# Patient Record
Sex: FEMALE | Race: WHITE | Employment: OTHER | URBAN - NONMETROPOLITAN AREA
[De-identification: names, ages, dates, MRNs, and addresses within clinical notes are randomized per-mention and may not be internally consistent; named-entity substitution may affect disease eponyms.]

---

## 2017-03-22 ENCOUNTER — OFFICE VISIT (OUTPATIENT)
Dept: INTERNAL MEDICINE CLINIC | Age: 67
End: 2017-03-22

## 2017-03-22 VITALS
WEIGHT: 215 LBS | HEIGHT: 65 IN | RESPIRATION RATE: 17 BRPM | HEART RATE: 92 BPM | TEMPERATURE: 97.9 F | BODY MASS INDEX: 35.82 KG/M2 | SYSTOLIC BLOOD PRESSURE: 122 MMHG | OXYGEN SATURATION: 97 % | DIASTOLIC BLOOD PRESSURE: 68 MMHG

## 2017-03-22 DIAGNOSIS — H91.93 HEARING LOSS, BILATERAL: ICD-10-CM

## 2017-03-22 DIAGNOSIS — K76.89 LIVER DYSFUNCTION: ICD-10-CM

## 2017-03-22 DIAGNOSIS — D49.6 BRAIN NEOPLASM (HCC): ICD-10-CM

## 2017-03-22 DIAGNOSIS — E78.2 MIXED HYPERLIPIDEMIA: Primary | ICD-10-CM

## 2017-03-22 DIAGNOSIS — F32.A DEPRESSION, UNSPECIFIED DEPRESSION TYPE: ICD-10-CM

## 2017-03-22 DIAGNOSIS — N28.1 CYST OF LEFT KIDNEY: ICD-10-CM

## 2017-03-22 DIAGNOSIS — K86.2 CYST OF PANCREAS: ICD-10-CM

## 2017-03-22 DIAGNOSIS — D86.9 SARCOIDOSIS: ICD-10-CM

## 2017-03-22 DIAGNOSIS — J44.9 CHRONIC OBSTRUCTIVE PULMONARY DISEASE, UNSPECIFIED COPD TYPE (HCC): ICD-10-CM

## 2017-03-22 PROCEDURE — 99213 OFFICE O/P EST LOW 20 MIN: CPT | Performed by: INTERNAL MEDICINE

## 2017-03-22 RX ORDER — ATORVASTATIN CALCIUM 40 MG/1
40 TABLET, FILM COATED ORAL DAILY
Qty: 30 TABLET | Refills: 5 | Status: SHIPPED | OUTPATIENT
Start: 2017-03-22 | End: 2017-10-09 | Stop reason: SDUPTHER

## 2017-03-22 ASSESSMENT — ENCOUNTER SYMPTOMS
GASTROINTESTINAL NEGATIVE: 1
DOUBLE VISION: 0
SORE THROAT: 0
EYES NEGATIVE: 1
COUGH: 0
RESPIRATORY NEGATIVE: 1
BLURRED VISION: 0
SHORTNESS OF BREATH: 0

## 2017-03-28 ENCOUNTER — HOSPITAL ENCOUNTER (OUTPATIENT)
Dept: GENERAL RADIOLOGY | Age: 67
Discharge: HOME | End: 2017-03-28
Attending: INTERNAL MEDICINE | Admitting: INTERNAL MEDICINE

## 2017-03-28 DIAGNOSIS — J43.9 EMPHYSEMATOUS BLEB (HCC): ICD-10-CM

## 2017-03-28 DIAGNOSIS — D86.9 SARCOIDOSIS: ICD-10-CM

## 2017-06-21 ENCOUNTER — HOSPITAL ENCOUNTER (OUTPATIENT)
Dept: LAB | Age: 67
Discharge: HOME | End: 2017-06-21
Attending: INTERNAL MEDICINE | Admitting: INTERNAL MEDICINE

## 2017-06-21 LAB
ALBUMIN SERPL-MCNC: 4.2 GM/DL (ref 3.4–5)
ALP BLD-CCNC: 129 IU/L (ref 40–129)
ALT SERPL-CCNC: 23 U/L (ref 10–40)
AST SERPL-CCNC: 17 IU/L (ref 15–37)
BILIRUB SERPL-MCNC: 0.4 MG/DL (ref 0–1)
BILIRUBIN DIRECT: 0.2 MG/DL (ref 0–0.3)
BILIRUBIN, INDIRECT: 0.2 MG/DL (ref 0–0.7)
CHOLESTEROL, FASTING: 168 MG/DL
HDLC SERPL-MCNC: 51 MG/DL
LDL CHOLESTEROL DIRECT: 89 MG/DL
TOTAL PROTEIN: 7.6 GM/DL (ref 6.4–8.2)
TRIGLYCERIDE, FASTING: 185 MG/DL

## 2017-07-13 ENCOUNTER — OFFICE VISIT (OUTPATIENT)
Dept: INTERNAL MEDICINE CLINIC | Age: 67
End: 2017-07-13

## 2017-07-13 VITALS
DIASTOLIC BLOOD PRESSURE: 90 MMHG | RESPIRATION RATE: 13 BRPM | OXYGEN SATURATION: 96 % | HEIGHT: 65 IN | WEIGHT: 219.6 LBS | HEART RATE: 92 BPM | TEMPERATURE: 98.1 F | SYSTOLIC BLOOD PRESSURE: 148 MMHG | BODY MASS INDEX: 36.59 KG/M2

## 2017-07-13 DIAGNOSIS — K86.2 CYST OF PANCREAS: ICD-10-CM

## 2017-07-13 DIAGNOSIS — Z12.31 VISIT FOR SCREENING MAMMOGRAM: ICD-10-CM

## 2017-07-13 DIAGNOSIS — Z00.00 HEALTH CARE MAINTENANCE: ICD-10-CM

## 2017-07-13 DIAGNOSIS — D49.6 BRAIN NEOPLASM (HCC): ICD-10-CM

## 2017-07-13 DIAGNOSIS — K76.89 LIVER DYSFUNCTION: ICD-10-CM

## 2017-07-13 DIAGNOSIS — Z12.11 SCREENING FOR COLON CANCER: ICD-10-CM

## 2017-07-13 DIAGNOSIS — F32.A DEPRESSION, UNSPECIFIED DEPRESSION TYPE: ICD-10-CM

## 2017-07-13 DIAGNOSIS — E78.2 MIXED HYPERLIPIDEMIA: Primary | ICD-10-CM

## 2017-07-13 DIAGNOSIS — H91.93 HEARING LOSS, BILATERAL: ICD-10-CM

## 2017-07-13 DIAGNOSIS — J44.9 CHRONIC OBSTRUCTIVE PULMONARY DISEASE, UNSPECIFIED COPD TYPE (HCC): ICD-10-CM

## 2017-07-13 DIAGNOSIS — N28.1 CYST OF LEFT KIDNEY: ICD-10-CM

## 2017-07-13 PROCEDURE — 99214 OFFICE O/P EST MOD 30 MIN: CPT | Performed by: INTERNAL MEDICINE

## 2017-07-13 ASSESSMENT — PATIENT HEALTH QUESTIONNAIRE - PHQ9
2. FEELING DOWN, DEPRESSED OR HOPELESS: 0
1. LITTLE INTEREST OR PLEASURE IN DOING THINGS: 0
SUM OF ALL RESPONSES TO PHQ9 QUESTIONS 1 & 2: 0
SUM OF ALL RESPONSES TO PHQ QUESTIONS 1-9: 0

## 2017-07-13 ASSESSMENT — ENCOUNTER SYMPTOMS
RESPIRATORY NEGATIVE: 1
GASTROINTESTINAL NEGATIVE: 1
EYES NEGATIVE: 1

## 2017-07-18 ENCOUNTER — HOSPITAL ENCOUNTER (OUTPATIENT)
Dept: MAMMOGRAPHY | Age: 67
Discharge: HOME | End: 2017-07-18
Attending: INTERNAL MEDICINE | Admitting: INTERNAL MEDICINE

## 2017-07-18 DIAGNOSIS — Z12.31 VISIT FOR SCREENING MAMMOGRAM: ICD-10-CM

## 2017-07-20 ENCOUNTER — TELEPHONE (OUTPATIENT)
Dept: INTERNAL MEDICINE CLINIC | Age: 67
End: 2017-07-20

## 2017-07-31 ENCOUNTER — TELEPHONE (OUTPATIENT)
Dept: INTERNAL MEDICINE CLINIC | Age: 67
End: 2017-07-31

## 2017-08-03 ENCOUNTER — HOSPITAL ENCOUNTER (OUTPATIENT)
Dept: MAMMOGRAPHY | Age: 67
Discharge: HOME | End: 2017-08-03
Attending: INTERNAL MEDICINE | Admitting: INTERNAL MEDICINE

## 2017-08-03 DIAGNOSIS — R92.8 ABNORMAL MAMMOGRAM OF LEFT BREAST: ICD-10-CM

## 2017-08-03 DIAGNOSIS — R92.8 ABNORMAL MAMMOGRAM: ICD-10-CM

## 2017-08-03 DIAGNOSIS — N64.89 BREAST ASYMMETRY: ICD-10-CM

## 2017-08-14 PROBLEM — R92.8 ABNORMAL MAMMOGRAM: Status: ACTIVE | Noted: 2017-08-14

## 2017-10-09 RX ORDER — ATORVASTATIN CALCIUM 40 MG/1
40 TABLET, FILM COATED ORAL DAILY
Qty: 30 TABLET | Refills: 5 | Status: SHIPPED | OUTPATIENT
Start: 2017-10-09 | End: 2018-05-10 | Stop reason: SDUPTHER

## 2017-10-17 ENCOUNTER — OFFICE VISIT (OUTPATIENT)
Dept: INTERNAL MEDICINE CLINIC | Age: 67
End: 2017-10-17

## 2017-10-17 VITALS
OXYGEN SATURATION: 95 % | SYSTOLIC BLOOD PRESSURE: 128 MMHG | WEIGHT: 205 LBS | DIASTOLIC BLOOD PRESSURE: 72 MMHG | HEART RATE: 72 BPM | RESPIRATION RATE: 11 BRPM | TEMPERATURE: 97.7 F | BODY MASS INDEX: 34.16 KG/M2 | HEIGHT: 65 IN

## 2017-10-17 DIAGNOSIS — N28.1 CYST OF LEFT KIDNEY: ICD-10-CM

## 2017-10-17 DIAGNOSIS — D49.6 BRAIN NEOPLASM (HCC): ICD-10-CM

## 2017-10-17 DIAGNOSIS — R92.8 ABNORMAL MAMMOGRAM: ICD-10-CM

## 2017-10-17 DIAGNOSIS — K76.89 LIVER DYSFUNCTION: ICD-10-CM

## 2017-10-17 DIAGNOSIS — Z00.00 HEALTH CARE MAINTENANCE: ICD-10-CM

## 2017-10-17 DIAGNOSIS — D86.9 SARCOIDOSIS: ICD-10-CM

## 2017-10-17 DIAGNOSIS — Z23 NEED FOR PROPHYLACTIC VACCINATION AND INOCULATION AGAINST INFLUENZA: ICD-10-CM

## 2017-10-17 DIAGNOSIS — E78.2 MIXED HYPERLIPIDEMIA: Primary | ICD-10-CM

## 2017-10-17 DIAGNOSIS — J44.9 CHRONIC OBSTRUCTIVE PULMONARY DISEASE, UNSPECIFIED COPD TYPE (HCC): ICD-10-CM

## 2017-10-17 DIAGNOSIS — F32.A DEPRESSION, UNSPECIFIED DEPRESSION TYPE: ICD-10-CM

## 2017-10-17 PROCEDURE — G0008 ADMIN INFLUENZA VIRUS VAC: HCPCS | Performed by: INTERNAL MEDICINE

## 2017-10-17 PROCEDURE — 99214 OFFICE O/P EST MOD 30 MIN: CPT | Performed by: INTERNAL MEDICINE

## 2017-10-17 PROCEDURE — 90662 IIV NO PRSV INCREASED AG IM: CPT | Performed by: INTERNAL MEDICINE

## 2017-10-17 NOTE — ASSESSMENT & PLAN NOTE
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 Funmilayo Rodríguez resected tumor. Dr. Magda Smallwood oncologist seeing pt also. No chemo. Tumor recurred, right frontal craniotomy and tumor resection 11/14 Dr. Alysa Sampson. Had radiation to the brain  Dr. Alysa Sampson following pt q year. Pt states she had MRI brain in 7 or 8/2017: was told it was stable.  F/u in one year

## 2017-10-17 NOTE — PATIENT INSTRUCTIONS
Vaccine Information Sheet, \"Influenza - Inactivated\"  given to Wali Cooper, or parent/legal guardian of  Wali Cooper and verbalized understanding. Patient responses:    Have you ever had a reaction to a flu vaccine? NO  Are you able to eat eggs without adverse effects? YES  Do you have any current illness? NO  Have you ever had Guillian [place] Syndrome? NO    Flu vaccine given per order. Please see immunization tab.

## 2017-10-17 NOTE — PROGRESS NOTES
Prabhjot Bocanegra  Patient's  is 1950  Seen in office on 10/17/2017      SUBJECTIVE:  Immanuel May is a 75 y. o.year old female presents today   Chief Complaint   Patient presents with    Hyperlipidemia     Patient is here for follow-up of hyperlipidemia COPD  Patient is taking medications. She has COPD also using inhalers and is feeling better  She had abnormal mammogram that needs to be repeated in 6 months  Patient denies any headaches. No chest pain, no shortness of breath. No cough or sputum production. No nausea, vomiting or diarrhea  Taking medications regularly. No side effects noted. Review of Systems   Constitutional: Negative. HENT: Negative. Eyes: Negative. Cardiovascular: Negative. Negative for chest pain and palpitations. Gastrointestinal: Negative. Genitourinary: Negative. Skin: Negative. Neurological: Negative. Endo/Heme/Allergies: Negative. Psychiatric/Behavioral: Negative. OBJECTIVE: /72 (Site: Left Arm, Position: Sitting)   Pulse 72   Temp 97.7 °F (36.5 °C)   Resp 11   Ht 5' 5\" (1.651 m) Comment: w shoes  Wt 205 lb (93 kg)   SpO2 95%   BMI 34.11 kg/m²     Wt Readings from Last 3 Encounters:   10/17/17 205 lb (93 kg)   17 213 lb (96.6 kg)   17 219 lb 9.6 oz (99.6 kg)      GENERAL:  Alert, oriented, pleasant, in no apparent distress. HEENT:  Conjunctiva pink, no scleral icterus. ENT clear. NECK:  Supple. No jugular venous distention noted. No masses felt,  CARDIOVASCULAR:  Normal S1 and S2    PULMONARY:  No respiratory distress. No wheezes or rales. ABDOMEN:  Soft and non-tender,no masses  or organomegaly. EXTREMITIES:  No cyanosis, clubbing, or significant edema. SKIN: Skin is warm and dry. NEUROLOGICAL:  Cranial nerves II through XII are grossly intact. IMPRESSION:    Encounter Diagnoses   Name Primary?     Mixed hyperlipidemia Yes    [facility]     Depression, unspecified depression type     Tetracycline Rash     Current Outpatient Prescriptions   Medication Sig Dispense Refill    atorvastatin (LIPITOR) 40 MG tablet Take 1 tablet by mouth daily 30 tablet 5    umeclidinium-vilanterol (ANORO ELLIPTA) 62.5-25 MCG/INH AEPB inhaler 1 puff daily 1 each 5    albuterol sulfate HFA (PROAIR HFA) 108 (90 BASE) MCG/ACT inhaler Inhale 2 puffs into the lungs every 6 hours as needed for Wheezing 1 Inhaler 3    traZODone (DESYREL) 50 MG tablet Take 25 mg by mouth nightly. 1    risperiDONE (RISPERDAL) 1 MG tablet Take 1 mg by mouth nightly.  buPROPion (WELLBUTRIN XL) 300 MG XL tablet Take 300 mg by mouth every morning. No current facility-administered medications for this visit. Past Medical History:   Diagnosis Date    Abnormal mammogram 8/14/2017    0.4 cm hypoechoic mass(most likely a small intramammary lymph node or less likely a complicated cyst.  Follow-up in 6 months recommended with ultrasound    Attempted suicide 2012    hanged herself. 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 Al Nowak resected tumor. Dr. Robbie Siddqii oncologist seeing pt also. No chemo. Dr. Al Nowak following pt q year. Tumor recurred, right frontal craniotomy and tumor resection 11/14 Dr. Jermaine Marx. Had radiation to the brain     Chronic obstructive pulmonary disease ([address] 6/15/2016    PFT showed mod sees Dr Dani Miller RW    Cyst of left kidney 9/11/2014    MR I of 7/14. follow-up in 6-12 months    Cyst of pancreas 9/11/2014    Needs follow-up in one year 7/15    Depression     Hearing loss     Hearing loss     Herniated disc     L4 &L5    Hyperlipidemia     Kidney stones     Liver dysfunction 6/6/2014    In 7/14 hepatitis ABC negative. Immune studies negative, iron studies normal.  MRI of liver mild fatty liver.  F/u LFTS normal.    Osteopenia 9/26/14    Sarcoidosis ([address]     Skin cancer     nose     Past Surgical History:   Procedure Laterality Date    CHOLECYSTECTOMY  2006    COLONOSCOPY

## 2017-10-23 ASSESSMENT — ENCOUNTER SYMPTOMS
GASTROINTESTINAL NEGATIVE: 1
EYES NEGATIVE: 1

## 2018-02-08 ENCOUNTER — OFFICE VISIT (OUTPATIENT)
Dept: INTERNAL MEDICINE CLINIC | Age: 68
End: 2018-02-08

## 2018-02-08 VITALS
OXYGEN SATURATION: 96 % | SYSTOLIC BLOOD PRESSURE: 120 MMHG | TEMPERATURE: 97.7 F | WEIGHT: 201 LBS | BODY MASS INDEX: 39.26 KG/M2 | DIASTOLIC BLOOD PRESSURE: 78 MMHG | RESPIRATION RATE: 18 BRPM | HEART RATE: 88 BPM

## 2018-02-08 DIAGNOSIS — K86.2 CYST OF PANCREAS: ICD-10-CM

## 2018-02-08 DIAGNOSIS — K76.89 LIVER DYSFUNCTION: ICD-10-CM

## 2018-02-08 DIAGNOSIS — D49.6 BRAIN NEOPLASM (HCC): ICD-10-CM

## 2018-02-08 DIAGNOSIS — Z00.00 HEALTH CARE MAINTENANCE: ICD-10-CM

## 2018-02-08 DIAGNOSIS — H91.93 BILATERAL HEARING LOSS, UNSPECIFIED HEARING LOSS TYPE: ICD-10-CM

## 2018-02-08 DIAGNOSIS — F32.A DEPRESSION, UNSPECIFIED DEPRESSION TYPE: ICD-10-CM

## 2018-02-08 DIAGNOSIS — D86.9 SARCOIDOSIS: ICD-10-CM

## 2018-02-08 DIAGNOSIS — J44.9 CHRONIC OBSTRUCTIVE PULMONARY DISEASE, UNSPECIFIED COPD TYPE (HCC): ICD-10-CM

## 2018-02-08 DIAGNOSIS — R92.8 ABNORMAL MAMMOGRAM: ICD-10-CM

## 2018-02-08 DIAGNOSIS — E78.2 MIXED HYPERLIPIDEMIA: Primary | ICD-10-CM

## 2018-02-08 PROCEDURE — G8427 DOCREV CUR MEDS BY ELIG CLIN: HCPCS | Performed by: INTERNAL MEDICINE

## 2018-02-08 PROCEDURE — 1123F ACP DISCUSS/DSCN MKR DOCD: CPT | Performed by: INTERNAL MEDICINE

## 2018-02-08 PROCEDURE — 1090F PRES/ABSN URINE INCON ASSESS: CPT | Performed by: INTERNAL MEDICINE

## 2018-02-08 PROCEDURE — G8926 SPIRO NO PERF OR DOC: HCPCS | Performed by: INTERNAL MEDICINE

## 2018-02-08 PROCEDURE — 4040F PNEUMOC VAC/ADMIN/RCVD: CPT | Performed by: INTERNAL MEDICINE

## 2018-02-08 PROCEDURE — 3017F COLORECTAL CA SCREEN DOC REV: CPT | Performed by: INTERNAL MEDICINE

## 2018-02-08 PROCEDURE — G8484 FLU IMMUNIZE NO ADMIN: HCPCS | Performed by: INTERNAL MEDICINE

## 2018-02-08 PROCEDURE — 3014F SCREEN MAMMO DOC REV: CPT | Performed by: INTERNAL MEDICINE

## 2018-02-08 PROCEDURE — 99214 OFFICE O/P EST MOD 30 MIN: CPT | Performed by: INTERNAL MEDICINE

## 2018-02-08 PROCEDURE — 1036F TOBACCO NON-USER: CPT | Performed by: INTERNAL MEDICINE

## 2018-02-08 PROCEDURE — G8417 CALC BMI ABV UP PARAM F/U: HCPCS | Performed by: INTERNAL MEDICINE

## 2018-02-08 PROCEDURE — 3023F SPIROM DOC REV: CPT | Performed by: INTERNAL MEDICINE

## 2018-02-08 PROCEDURE — G8399 PT W/DXA RESULTS DOCUMENT: HCPCS | Performed by: INTERNAL MEDICINE

## 2018-02-08 ASSESSMENT — ENCOUNTER SYMPTOMS
RESPIRATORY NEGATIVE: 1
EYES NEGATIVE: 1
GASTROINTESTINAL NEGATIVE: 1

## 2018-02-08 NOTE — ASSESSMENT & PLAN NOTE
5 mm non-enhancing cyst on the pancreas has not changed. Non-specific. Could represent a side branch off I PMN Needs follow-up in one year in April 2016  Patient had MRI of the pancreas and kidney cyst in April 2015. Both are stable and needed follow-up in one year. 6/16 MRI abdomen : no change. MRI of the abdomen ordered  Patient has a cyst in the pancreas therefore an MRI was ordered for a follow up of the cyst.  Talked to peer to peer Dr. Jody Hazel and she recommended MRI to be done next year. She said the protocols states there is no need to do that frequently. Will do repeat MRI of the pancreas next year. Patient will be informed.   MRI will be canceled

## 2018-02-08 NOTE — ASSESSMENT & PLAN NOTE
Patient has mild elevation of liver functions. Statins were on hold. Follow low-cholesterol diet. Patient was started on atorvastatin and is tolerating it well.

## 2018-03-01 ENCOUNTER — HOSPITAL ENCOUNTER (OUTPATIENT)
Dept: ULTRASOUND IMAGING | Age: 68
Discharge: HOME | End: 2018-03-01
Attending: INTERNAL MEDICINE | Admitting: INTERNAL MEDICINE

## 2018-03-01 DIAGNOSIS — R92.8 ABNORMAL MAMMOGRAM: ICD-10-CM

## 2018-03-01 DIAGNOSIS — R92.8 OTHER ABNORMAL AND INCONCLUSIVE FINDINGS ON DIAGNOSTIC IMAGING OF BREAST: ICD-10-CM

## 2018-03-06 ENCOUNTER — TELEPHONE (OUTPATIENT)
Dept: INTERNAL MEDICINE CLINIC | Age: 68
End: 2018-03-06

## 2018-04-11 PROBLEM — Z00.00 HEALTH CARE MAINTENANCE: Status: RESOLVED | Noted: 2017-07-13 | Resolved: 2018-04-11

## 2018-05-10 ENCOUNTER — OFFICE VISIT (OUTPATIENT)
Dept: INTERNAL MEDICINE CLINIC | Age: 68
End: 2018-05-10

## 2018-05-10 VITALS
SYSTOLIC BLOOD PRESSURE: 126 MMHG | BODY MASS INDEX: 39.33 KG/M2 | RESPIRATION RATE: 12 BRPM | HEART RATE: 88 BPM | DIASTOLIC BLOOD PRESSURE: 82 MMHG | TEMPERATURE: 98.2 F | WEIGHT: 201.4 LBS | OXYGEN SATURATION: 96 %

## 2018-05-10 DIAGNOSIS — D49.6 BRAIN NEOPLASM (HCC): ICD-10-CM

## 2018-05-10 DIAGNOSIS — F32.A DEPRESSION, UNSPECIFIED DEPRESSION TYPE: ICD-10-CM

## 2018-05-10 DIAGNOSIS — J44.9 CHRONIC OBSTRUCTIVE PULMONARY DISEASE, UNSPECIFIED COPD TYPE (HCC): ICD-10-CM

## 2018-05-10 DIAGNOSIS — H91.93 BILATERAL HEARING LOSS, UNSPECIFIED HEARING LOSS TYPE: ICD-10-CM

## 2018-05-10 DIAGNOSIS — D86.9 SARCOIDOSIS: ICD-10-CM

## 2018-05-10 DIAGNOSIS — K76.89 LIVER DYSFUNCTION: ICD-10-CM

## 2018-05-10 DIAGNOSIS — C71.9 OLIGOASTROCYTOMA, WHO GRADE 2 (HCC): ICD-10-CM

## 2018-05-10 DIAGNOSIS — E78.2 MIXED HYPERLIPIDEMIA: Primary | ICD-10-CM

## 2018-05-10 PROCEDURE — 3023F SPIROM DOC REV: CPT | Performed by: INTERNAL MEDICINE

## 2018-05-10 PROCEDURE — 4040F PNEUMOC VAC/ADMIN/RCVD: CPT | Performed by: INTERNAL MEDICINE

## 2018-05-10 PROCEDURE — 3017F COLORECTAL CA SCREEN DOC REV: CPT | Performed by: INTERNAL MEDICINE

## 2018-05-10 PROCEDURE — G8926 SPIRO NO PERF OR DOC: HCPCS | Performed by: INTERNAL MEDICINE

## 2018-05-10 PROCEDURE — 1036F TOBACCO NON-USER: CPT | Performed by: INTERNAL MEDICINE

## 2018-05-10 PROCEDURE — G8417 CALC BMI ABV UP PARAM F/U: HCPCS | Performed by: INTERNAL MEDICINE

## 2018-05-10 PROCEDURE — 3014F SCREEN MAMMO DOC REV: CPT | Performed by: INTERNAL MEDICINE

## 2018-05-10 PROCEDURE — 1090F PRES/ABSN URINE INCON ASSESS: CPT | Performed by: INTERNAL MEDICINE

## 2018-05-10 PROCEDURE — 1123F ACP DISCUSS/DSCN MKR DOCD: CPT | Performed by: INTERNAL MEDICINE

## 2018-05-10 PROCEDURE — G8399 PT W/DXA RESULTS DOCUMENT: HCPCS | Performed by: INTERNAL MEDICINE

## 2018-05-10 PROCEDURE — 99214 OFFICE O/P EST MOD 30 MIN: CPT | Performed by: INTERNAL MEDICINE

## 2018-05-10 PROCEDURE — G8427 DOCREV CUR MEDS BY ELIG CLIN: HCPCS | Performed by: INTERNAL MEDICINE

## 2018-05-10 RX ORDER — RISPERIDONE 1 MG/1
1 TABLET, FILM COATED ORAL NIGHTLY
Qty: 30 TABLET | Refills: 1 | Status: SHIPPED | OUTPATIENT
Start: 2018-05-10 | End: 2018-06-27

## 2018-05-10 RX ORDER — ATORVASTATIN CALCIUM 40 MG/1
40 TABLET, FILM COATED ORAL DAILY
Qty: 30 TABLET | Refills: 5 | Status: SHIPPED | OUTPATIENT
Start: 2018-05-10 | End: 2018-07-09 | Stop reason: SDUPTHER

## 2018-05-10 RX ORDER — TRAZODONE HYDROCHLORIDE 50 MG/1
50 TABLET ORAL NIGHTLY
Qty: 30 TABLET | Refills: 1 | Status: SHIPPED | OUTPATIENT
Start: 2018-05-10 | End: 2018-06-27

## 2018-05-10 RX ORDER — BUPROPION HYDROCHLORIDE 300 MG/1
300 TABLET ORAL EVERY MORNING
Qty: 30 TABLET | Refills: 1 | Status: SHIPPED | OUTPATIENT
Start: 2018-05-10 | End: 2018-10-03

## 2018-05-10 ASSESSMENT — ENCOUNTER SYMPTOMS
SHORTNESS OF BREATH: 0
GASTROINTESTINAL NEGATIVE: 1
COUGH: 0
BACK PAIN: 0
EYES NEGATIVE: 1
RESPIRATORY NEGATIVE: 1

## 2018-05-11 ENCOUNTER — HOSPITAL ENCOUNTER (OUTPATIENT)
Dept: LAB | Age: 68
Discharge: HOME | End: 2018-05-11
Attending: INTERNAL MEDICINE | Admitting: INTERNAL MEDICINE

## 2018-05-11 DIAGNOSIS — D86.9 SARCOIDOSIS: ICD-10-CM

## 2018-05-11 LAB
ALBUMIN SERPL-MCNC: 4.1 GM/DL (ref 3.4–5)
ALP BLD-CCNC: 143 IU/L (ref 40–129)
ALT SERPL-CCNC: 56 U/L (ref 10–40)
ANION GAP SERPL CALCULATED.3IONS-SCNC: 12 MMOL/L (ref 4–16)
AST SERPL-CCNC: 26 IU/L (ref 15–37)
BILIRUB SERPL-MCNC: 0.4 MG/DL (ref 0–1)
BUN BLDV-MCNC: 13 MG/DL (ref 6–23)
CALCIUM SERPL-MCNC: 9.6 MG/DL (ref 8.3–10.6)
CHLORIDE BLD-SCNC: 98 MMOL/L (ref 99–110)
CHOLESTEROL, FASTING: 263 MG/DL
CO2: 28 MMOL/L (ref 21–32)
CREAT SERPL-MCNC: 1.1 MG/DL (ref 0.6–1.1)
GFR AFRICAN AMERICAN: 60 ML/MIN/1.73M2
GFR NON-AFRICAN AMERICAN: 49 ML/MIN/1.73M2
GLUCOSE FASTING: 122 MG/DL (ref 70–99)
HDLC SERPL-MCNC: 53 MG/DL
LDL CHOLESTEROL DIRECT: 173 MG/DL
POTASSIUM SERPL-SCNC: 4.4 MMOL/L (ref 3.5–5.1)
SODIUM BLD-SCNC: 138 MMOL/L (ref 135–145)
TOTAL PROTEIN: 7.5 GM/DL (ref 6.4–8.2)
TRIGLYCERIDE, FASTING: 308 MG/DL

## 2018-05-14 ENCOUNTER — TELEPHONE (OUTPATIENT)
Dept: INTERNAL MEDICINE CLINIC | Age: 68
End: 2018-05-14

## 2018-05-14 DIAGNOSIS — E78.2 MIXED HYPERLIPIDEMIA: Primary | ICD-10-CM

## 2018-07-09 ENCOUNTER — HOSPITAL ENCOUNTER (OUTPATIENT)
Dept: LAB | Age: 68
Discharge: HOME | End: 2018-07-09
Attending: INTERNAL MEDICINE | Admitting: INTERNAL MEDICINE

## 2018-07-09 LAB
CHOLESTEROL, FASTING: 189 MG/DL
HDLC SERPL-MCNC: 60 MG/DL
LDL CHOLESTEROL DIRECT: 110 MG/DL
TRIGLYCERIDE, FASTING: 253 MG/DL

## 2018-07-09 RX ORDER — ATORVASTATIN CALCIUM 40 MG/1
40 TABLET, FILM COATED ORAL DAILY
Qty: 90 TABLET | Refills: 1 | Status: SHIPPED | OUTPATIENT
Start: 2018-07-09 | End: 2019-01-15 | Stop reason: SDUPTHER

## 2018-07-11 ENCOUNTER — OFFICE VISIT (OUTPATIENT)
Dept: INTERNAL MEDICINE CLINIC | Age: 68
End: 2018-07-11

## 2018-07-11 VITALS
BODY MASS INDEX: 31.69 KG/M2 | HEIGHT: 66 IN | TEMPERATURE: 98.1 F | OXYGEN SATURATION: 97 % | RESPIRATION RATE: 20 BRPM | DIASTOLIC BLOOD PRESSURE: 86 MMHG | SYSTOLIC BLOOD PRESSURE: 132 MMHG | WEIGHT: 197.2 LBS | HEART RATE: 85 BPM

## 2018-07-11 DIAGNOSIS — D49.6 BRAIN NEOPLASM (HCC): ICD-10-CM

## 2018-07-11 DIAGNOSIS — K76.89 LIVER DYSFUNCTION: ICD-10-CM

## 2018-07-11 DIAGNOSIS — E78.2 MIXED HYPERLIPIDEMIA: ICD-10-CM

## 2018-07-11 DIAGNOSIS — H91.93 BILATERAL HEARING LOSS, UNSPECIFIED HEARING LOSS TYPE: ICD-10-CM

## 2018-07-11 DIAGNOSIS — D86.9 SARCOIDOSIS: ICD-10-CM

## 2018-07-11 DIAGNOSIS — F32.A DEPRESSION, UNSPECIFIED DEPRESSION TYPE: ICD-10-CM

## 2018-07-11 PROCEDURE — G8427 DOCREV CUR MEDS BY ELIG CLIN: HCPCS | Performed by: INTERNAL MEDICINE

## 2018-07-11 PROCEDURE — 4040F PNEUMOC VAC/ADMIN/RCVD: CPT | Performed by: INTERNAL MEDICINE

## 2018-07-11 PROCEDURE — 1036F TOBACCO NON-USER: CPT | Performed by: INTERNAL MEDICINE

## 2018-07-11 PROCEDURE — 1123F ACP DISCUSS/DSCN MKR DOCD: CPT | Performed by: INTERNAL MEDICINE

## 2018-07-11 PROCEDURE — 3017F COLORECTAL CA SCREEN DOC REV: CPT | Performed by: INTERNAL MEDICINE

## 2018-07-11 PROCEDURE — 1090F PRES/ABSN URINE INCON ASSESS: CPT | Performed by: INTERNAL MEDICINE

## 2018-07-11 PROCEDURE — G8417 CALC BMI ABV UP PARAM F/U: HCPCS | Performed by: INTERNAL MEDICINE

## 2018-07-11 PROCEDURE — 3014F SCREEN MAMMO DOC REV: CPT | Performed by: INTERNAL MEDICINE

## 2018-07-11 PROCEDURE — 1101F PT FALLS ASSESS-DOCD LE1/YR: CPT | Performed by: INTERNAL MEDICINE

## 2018-07-11 PROCEDURE — 99214 OFFICE O/P EST MOD 30 MIN: CPT | Performed by: INTERNAL MEDICINE

## 2018-07-11 PROCEDURE — G8399 PT W/DXA RESULTS DOCUMENT: HCPCS | Performed by: INTERNAL MEDICINE

## 2018-07-11 ASSESSMENT — ENCOUNTER SYMPTOMS
GASTROINTESTINAL NEGATIVE: 1
EYES NEGATIVE: 1
RESPIRATORY NEGATIVE: 1

## 2018-07-11 NOTE — ASSESSMENT & PLAN NOTE
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 Cain Cowden resected tumor. Dr. Kim Santillan oncologist seeing pt also. No chemo. Tumor recurred, right frontal craniotomy and tumor resection 11/14 Dr. Marin Olsen. Had radiation to the brain  Dr. Marin Olsen following pt q year. Pt states she had MRI brain in 7 or 8/2017: was told it was stable. F/u in one year  Seen Dr Jazmyne Flores last week and she was told to be stable. Dr Candi Byers seen pt in 6/2018.  Will see in 6 months again

## 2018-07-11 NOTE — PROGRESS NOTES
IMPRESSION:    Encounter Diagnoses   Name Primary?  Mixed hyperlipidemia     Depression, unspecified depression type     [facility]     Sarcoidosis     Bilateral hearing loss, unspecified hearing loss type     Liver dysfunction        ASSESSMENT/PLAN:    Mixed hyperlipidemia  Patient has mild elevation of liver functions. Statins were on hold. Follow low-cholesterol diet. Patient was started on atorvastatin and is tolerating it well. Pt was not taking on regular basis now taking atorvastatin daily  ALT : 56    Sarcoidosis  See Dr Santos Torre. He saw patient and he started her on inhaler Anora and albuterol prn    Depression  Had suicidal attempt by hanging. Sees psychiatrist Dr. Cj Schafer  On trazodone and risperidone and bupropion  Dr Cj Schafer retired : pt  Does not want to see new one  She stopped trazodone and risperidone . Takes Welbutrin   Continue welbutrin. 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 Hussein Agosto resected tumor. Dr. Boogie [facility] oncologist seeing pt also. No chemo. Tumor recurred, right frontal craniotomy and tumor resection 11/14 Dr. Alicia Leroy. Had radiation to the brain  Dr. Alicia Leroy following pt q year. Pt states she had MRI brain in 7 or 8/2017: was told it was stable. F/u in one year  Seen Dr Trupti Carpenter last week and she was told to be stable. Dr Morena Evangelista seen pt in 6/2018. Will see in 6 months again      Return to office in 3 months. Mediations reviewed with the patient. Continue current medications. Appropriate prescriptions are addressed. After visit summery provided. Follow up as directed sooner if needed. Questions answered and patient verbalizes understanding. Call for any problems, questions, or concerns.        Allergies   Allergen Reactions    Minocycline     Tetracyclines & Related     Demeclocycline Rash    Tetracycline Rash     Current Outpatient Prescriptions Medication Sig Dispense Refill    atorvastatin (LIPITOR) 40 MG tablet Take 1 tablet by mouth daily 90 tablet 1    umeclidinium-vilanterol (ANORO ELLIPTA) 62.5-25 MCG/INH AEPB inhaler 1 puff daily 1 each 5    buPROPion (WELLBUTRIN XL) 300 MG extended release tablet Take 1 tablet by mouth every morning 30 tablet 1    albuterol sulfate HFA (PROAIR HFA) 108 (90 Base) MCG/ACT inhaler Inhale 2 puffs into the lungs every 6 hours as needed for Wheezing 1 Inhaler 3     No current facility-administered medications for this visit. Past Medical History:   Diagnosis Date    Abnormal mammogram 8/14/2017    0.4 cm hypoechoic mass(most likely a small intramammary lymph node or less likely a complicated cyst.  Follow-up in 6 months recommended with ultrasound    Attempted suicide 2012    hanged herself. 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 Ronny Kohler resected tumor. Dr. Vikas Huber oncologist seeing pt also. No chemo. Dr. Ronny Kohler following pt q year. Tumor recurred, right frontal craniotomy and tumor resection 11/14 Dr. Albert Simon. Had radiation to the brain     Chronic obstructive pulmonary disease ([facility] [facility] 6/15/2016    PFT showed mod sees Dr Jimbo Tan RW    Cyst of left kidney 9/11/2014    MR I of 7/14. follow-up in 6-12 months    Cyst of pancreas 9/11/2014    Needs follow-up in one year 7/15    Depression     Hearing loss     Hearing loss     Herniated disc     L4 &L5    Hyperlipidemia     Kidney stones     Liver dysfunction 6/6/2014    In 7/14 hepatitis ABC negative. Immune studies negative, iron studies normal.  MRI of liver mild fatty liver.  F/u LFTS normal.    Osteopenia 9/26/14    Sarcoidosis ([address]     Skin cancer     nose     Past Surgical History:   Procedure Laterality Date    CHOLECYSTECTOMY  2006    COLONOSCOPY  2008    f/u 5 years- Dr Ever Giraldo  4/13    resection of brain tumor Dr. Omero Dowell LITHOTRIPSY  2005    Albertina Masters  2011     Ric    SKIN BIOPSY      TONSILLECTOMY       Social History   Substance Use Topics    Smoking status: Former Smoker     Packs/day: 0.75     Years: 20.00     Quit date: 8/22/2003    Smokeless tobacco: Never Used    Alcohol use No       LAB REVIEW:  CBC:   Lab Results   Component Value Date    WBC 6.7 04/11/2014    HGB 13.8 04/11/2014    HCT 42.4 04/11/2014     04/11/2014     Lipids:   Lab Results   Component Value Date    CHOL 179 12/16/2016    TRIG 231 (H) 12/16/2016    HDL 60 07/09/2018    LDLDIRECT 110 (H) 07/09/2018    TRIGLYCFAST 253 (H) 07/09/2018    CHOLESTFAST 189 07/09/2018     Renal:   Lab Results   Component Value Date    BUN 13 05/11/2018    CREATININE 1.1 05/11/2018     05/11/2018    K 4.4 05/11/2018    ALT 56 05/11/2018    AST 26 05/11/2018     PT/INR:   Lab Results   Component Value Date    INR 0.91 10/14/2012     A1C:   Lab Results   Component Value Date    LABA1C 5.6 06/02/2014           Aura Perez MD, 7/11/2018 , 2:24 PM

## 2018-07-26 ENCOUNTER — TELEPHONE (OUTPATIENT)
Dept: INTERNAL MEDICINE CLINIC | Age: 68
End: 2018-07-26

## 2018-07-26 NOTE — TELEPHONE ENCOUNTER
Patient called asking for a referral to Dr. Florina Quezada at [facility] per Dr. Jose Zarate at [facility].

## 2018-07-27 ENCOUNTER — TELEPHONE (OUTPATIENT)
Dept: INTERNAL MEDICINE CLINIC | Age: 68
End: 2018-07-27

## 2018-07-27 NOTE — TELEPHONE ENCOUNTER
Tried to reach patient to find out why the dr at [facility] cannot see her. She was told to get a referral from us to see Dr Lynda Bergman. Dr Paulino Kaur notified and needs to know why. Pt needs to see a doctor of psychology to prescribe meds. No answer at home. Called [facility] in [place], no documentation of such. Instructed to  in [place]. Office Bertrand Eaton at [phone]. Spoke with Donna Oquendo and she suggested that dr send her a referral to [place] office on Monday. Fax [phone]. Tried to reach patient again, no answer.

## 2018-08-07 DIAGNOSIS — F32.A DEPRESSION, UNSPECIFIED DEPRESSION TYPE: Primary | ICD-10-CM

## 2018-08-09 ENCOUNTER — TELEPHONE (OUTPATIENT)
Dept: INTERNAL MEDICINE CLINIC | Age: 68
End: 2018-08-09

## 2018-10-08 NOTE — ASSESSMENT & PLAN NOTE
Had flu vaccine today this 16 female presents to ed for ankle pain . patient states she was coming off city bus [place] friday and lost her footing. patient Is having pain.

## 2018-10-13 ENCOUNTER — HOSPITAL ENCOUNTER (OUTPATIENT)
Age: 68
Discharge: HOME | End: 2018-10-13
Payer: MEDICARE

## 2018-10-13 DIAGNOSIS — E78.2 MIXED HYPERLIPIDEMIA: ICD-10-CM

## 2018-10-13 LAB
ALBUMIN SERPL-MCNC: 4 GM/DL (ref 3.4–5)
ALP BLD-CCNC: 124 IU/L (ref 40–129)
ALT SERPL-CCNC: 24 U/L (ref 10–40)
AST SERPL-CCNC: 14 IU/L (ref 15–37)
BILIRUB SERPL-MCNC: 0.3 MG/DL (ref 0–1)
BILIRUBIN DIRECT: 0.2 MG/DL (ref 0–0.3)
BILIRUBIN, INDIRECT: 0.1 MG/DL (ref 0–0.7)
CHOLESTEROL: 170 MG/DL
HDLC SERPL-MCNC: 60 MG/DL
LDL CHOLESTEROL DIRECT: 101 MG/DL
TOTAL PROTEIN: 7.5 GM/DL (ref 6.4–8.2)
TRIGL SERPL-MCNC: 148 MG/DL

## 2018-10-13 PROCEDURE — 80076 HEPATIC FUNCTION PANEL: CPT

## 2018-10-13 PROCEDURE — 36415 COLL VENOUS BLD VENIPUNCTURE: CPT

## 2018-10-13 PROCEDURE — 83721 ASSAY OF BLOOD LIPOPROTEIN: CPT

## 2018-10-13 PROCEDURE — 80061 LIPID PANEL: CPT

## 2018-10-15 ENCOUNTER — OFFICE VISIT (OUTPATIENT)
Dept: INTERNAL MEDICINE CLINIC | Age: 68
End: 2018-10-15
Payer: MEDICARE

## 2018-10-15 VITALS
RESPIRATION RATE: 16 BRPM | DIASTOLIC BLOOD PRESSURE: 78 MMHG | WEIGHT: 211 LBS | OXYGEN SATURATION: 98 % | HEART RATE: 93 BPM | SYSTOLIC BLOOD PRESSURE: 124 MMHG | TEMPERATURE: 97.8 F | BODY MASS INDEX: 41.21 KG/M2

## 2018-10-15 DIAGNOSIS — Z23 NEED FOR PROPHYLACTIC VACCINATION AND INOCULATION AGAINST VARICELLA: ICD-10-CM

## 2018-10-15 DIAGNOSIS — Z12.31 VISIT FOR SCREENING MAMMOGRAM: ICD-10-CM

## 2018-10-15 DIAGNOSIS — K76.89 LIVER DYSFUNCTION: ICD-10-CM

## 2018-10-15 DIAGNOSIS — D49.6 BRAIN NEOPLASM (HCC): ICD-10-CM

## 2018-10-15 DIAGNOSIS — H91.93 BILATERAL HEARING LOSS, UNSPECIFIED HEARING LOSS TYPE: ICD-10-CM

## 2018-10-15 DIAGNOSIS — J44.9 CHRONIC OBSTRUCTIVE PULMONARY DISEASE, UNSPECIFIED COPD TYPE (HCC): ICD-10-CM

## 2018-10-15 DIAGNOSIS — Z23 NEED FOR PROPHYLACTIC VACCINATION AGAINST DIPHTHERIA-TETANUS-PERTUSSIS (DTP): ICD-10-CM

## 2018-10-15 DIAGNOSIS — E78.2 MIXED HYPERLIPIDEMIA: Primary | ICD-10-CM

## 2018-10-15 DIAGNOSIS — F32.A DEPRESSION, UNSPECIFIED DEPRESSION TYPE: ICD-10-CM

## 2018-10-15 DIAGNOSIS — C71.9 OLIGOASTROCYTOMA, WHO GRADE 2 (HCC): ICD-10-CM

## 2018-10-15 DIAGNOSIS — D86.9 SARCOIDOSIS: ICD-10-CM

## 2018-10-15 PROCEDURE — G8482 FLU IMMUNIZE ORDER/ADMIN: HCPCS | Performed by: INTERNAL MEDICINE

## 2018-10-15 PROCEDURE — 3017F COLORECTAL CA SCREEN DOC REV: CPT | Performed by: INTERNAL MEDICINE

## 2018-10-15 PROCEDURE — 99214 OFFICE O/P EST MOD 30 MIN: CPT | Performed by: INTERNAL MEDICINE

## 2018-10-15 PROCEDURE — G8399 PT W/DXA RESULTS DOCUMENT: HCPCS | Performed by: INTERNAL MEDICINE

## 2018-10-15 PROCEDURE — G8427 DOCREV CUR MEDS BY ELIG CLIN: HCPCS | Performed by: INTERNAL MEDICINE

## 2018-10-15 PROCEDURE — G8417 CALC BMI ABV UP PARAM F/U: HCPCS | Performed by: INTERNAL MEDICINE

## 2018-10-15 PROCEDURE — 3023F SPIROM DOC REV: CPT | Performed by: INTERNAL MEDICINE

## 2018-10-15 PROCEDURE — 1090F PRES/ABSN URINE INCON ASSESS: CPT | Performed by: INTERNAL MEDICINE

## 2018-10-15 PROCEDURE — 1123F ACP DISCUSS/DSCN MKR DOCD: CPT | Performed by: INTERNAL MEDICINE

## 2018-10-15 PROCEDURE — 1101F PT FALLS ASSESS-DOCD LE1/YR: CPT | Performed by: INTERNAL MEDICINE

## 2018-10-15 PROCEDURE — 4040F PNEUMOC VAC/ADMIN/RCVD: CPT | Performed by: INTERNAL MEDICINE

## 2018-10-15 PROCEDURE — 3014F SCREEN MAMMO DOC REV: CPT | Performed by: INTERNAL MEDICINE

## 2018-10-15 PROCEDURE — 1036F TOBACCO NON-USER: CPT | Performed by: INTERNAL MEDICINE

## 2018-10-15 PROCEDURE — G8926 SPIRO NO PERF OR DOC: HCPCS | Performed by: INTERNAL MEDICINE

## 2018-10-15 NOTE — PROGRESS NOTES
CREATININE 1.1 05/11/2018     05/11/2018    K 4.4 05/11/2018    ALT 24 10/13/2018    AST 14 10/13/2018     PT/INR:   Lab Results   Component Value Date    INR 0.91 10/14/2012     A1C:   Lab Results   Component Value Date    LABA1C 5.6 06/02/2014           Merrick Rodriguez MD, 10/15/2018 , 2:54 PM

## 2018-10-24 ENCOUNTER — HOSPITAL ENCOUNTER (OUTPATIENT)
Dept: MAMMOGRAPHY | Age: 68
Discharge: HOME | End: 2018-10-24
Payer: MEDICARE

## 2018-10-24 DIAGNOSIS — Z12.31 VISIT FOR SCREENING MAMMOGRAM: ICD-10-CM

## 2018-10-24 PROCEDURE — 77067 SCR MAMMO BI INCL CAD: CPT

## 2018-10-28 ASSESSMENT — ENCOUNTER SYMPTOMS
RESPIRATORY NEGATIVE: 1
ALLERGIC/IMMUNOLOGIC NEGATIVE: 1
GASTROINTESTINAL NEGATIVE: 1
EYES NEGATIVE: 1

## 2018-11-08 ENCOUNTER — HOSPITAL ENCOUNTER (OUTPATIENT)
Dept: ULTRASOUND IMAGING | Age: 68
Discharge: HOME | End: 2018-11-08
Payer: MEDICARE

## 2018-11-08 ENCOUNTER — HOSPITAL ENCOUNTER (OUTPATIENT)
Dept: MAMMOGRAPHY | Age: 68
Discharge: HOME | End: 2018-11-08
Payer: MEDICARE

## 2018-11-08 DIAGNOSIS — N64.89 BREAST ASYMMETRY: ICD-10-CM

## 2018-11-08 DIAGNOSIS — R92.8 ABNORMAL MAMMOGRAM: ICD-10-CM

## 2018-11-08 PROCEDURE — 76642 ULTRASOUND BREAST LIMITED: CPT

## 2018-11-08 PROCEDURE — 77065 DX MAMMO INCL CAD UNI: CPT

## 2018-11-28 ENCOUNTER — TELEPHONE (OUTPATIENT)
Dept: INTERNAL MEDICINE CLINIC | Age: 68
End: 2018-11-28

## 2018-11-28 DIAGNOSIS — N63.20 MASS OF MULTIPLE SITES OF LEFT BREAST: Primary | ICD-10-CM

## 2018-12-04 ENCOUNTER — OFFICE VISIT (OUTPATIENT)
Dept: SURGERY | Age: 68
End: 2018-12-04
Payer: MEDICARE

## 2018-12-04 VITALS
BODY MASS INDEX: 33.91 KG/M2 | HEIGHT: 66 IN | WEIGHT: 211 LBS | SYSTOLIC BLOOD PRESSURE: 114 MMHG | OXYGEN SATURATION: 98 % | HEART RATE: 93 BPM | DIASTOLIC BLOOD PRESSURE: 70 MMHG

## 2018-12-04 DIAGNOSIS — N63.0 LUMP OR MASS IN BREAST: Primary | ICD-10-CM

## 2018-12-04 PROCEDURE — 1036F TOBACCO NON-USER: CPT | Performed by: SURGERY

## 2018-12-04 PROCEDURE — 1123F ACP DISCUSS/DSCN MKR DOCD: CPT | Performed by: SURGERY

## 2018-12-04 PROCEDURE — G8399 PT W/DXA RESULTS DOCUMENT: HCPCS | Performed by: SURGERY

## 2018-12-04 PROCEDURE — G8417 CALC BMI ABV UP PARAM F/U: HCPCS | Performed by: SURGERY

## 2018-12-04 PROCEDURE — 4040F PNEUMOC VAC/ADMIN/RCVD: CPT | Performed by: SURGERY

## 2018-12-04 PROCEDURE — 1101F PT FALLS ASSESS-DOCD LE1/YR: CPT | Performed by: SURGERY

## 2018-12-04 PROCEDURE — 99203 OFFICE O/P NEW LOW 30 MIN: CPT | Performed by: SURGERY

## 2018-12-04 PROCEDURE — 3014F SCREEN MAMMO DOC REV: CPT | Performed by: SURGERY

## 2018-12-04 PROCEDURE — G8482 FLU IMMUNIZE ORDER/ADMIN: HCPCS | Performed by: SURGERY

## 2018-12-04 PROCEDURE — G8427 DOCREV CUR MEDS BY ELIG CLIN: HCPCS | Performed by: SURGERY

## 2018-12-04 PROCEDURE — 1090F PRES/ABSN URINE INCON ASSESS: CPT | Performed by: SURGERY

## 2018-12-04 PROCEDURE — 3017F COLORECTAL CA SCREEN DOC REV: CPT | Performed by: SURGERY

## 2018-12-04 NOTE — PROGRESS NOTES
current facility-administered medications on file prior to visit. Allergies   Allergen Reactions    Minocycline     Tetracyclines & Related     Demeclocycline Rash    Tetracycline Rash     Review of Systems  A comprehensive review of systems was negative. Objective:      /70 (Site: Right Upper Arm, Position: Sitting, Cuff Size: Large Adult)   Pulse 93   Ht 5' 6\" (1.676 m)   Wt 211 lb (95.7 kg)   SpO2 98%   BMI 34.06 kg/m²   General appearance: alert, appears stated age and cooperative  Neck: no adenopathy, supple, symmetrical, trachea midline and thyroid not enlarged, symmetric, no tenderness/mass/nodules  Lungs: clear to auscultation bilaterally  Breasts: normal appearance, no masses or tenderness, Inspection negative, No nipple retraction or dimpling, No nipple discharge or bleeding, No axillary or supraclavicular adenopathy, Normal to palpation without dominant masses  Heart: regular rate and rhythm, S1, S2 normal, no murmur, click, rub or gallop      Assessment:      Patient is diagnosed with fibrocystic changes. Plan:      Follow-up in 6 months with an ultrasound.

## 2019-01-15 ENCOUNTER — OFFICE VISIT (OUTPATIENT)
Dept: INTERNAL MEDICINE CLINIC | Age: 69
End: 2019-01-15
Payer: MEDICARE

## 2019-01-15 VITALS
DIASTOLIC BLOOD PRESSURE: 86 MMHG | HEART RATE: 72 BPM | OXYGEN SATURATION: 99 % | SYSTOLIC BLOOD PRESSURE: 120 MMHG | TEMPERATURE: 98.5 F | WEIGHT: 218 LBS | RESPIRATION RATE: 16 BRPM | BODY MASS INDEX: 42.58 KG/M2

## 2019-01-15 DIAGNOSIS — H91.93 BILATERAL HEARING LOSS, UNSPECIFIED HEARING LOSS TYPE: ICD-10-CM

## 2019-01-15 DIAGNOSIS — K76.89 LIVER DYSFUNCTION: ICD-10-CM

## 2019-01-15 DIAGNOSIS — J44.9 CHRONIC OBSTRUCTIVE PULMONARY DISEASE, UNSPECIFIED COPD TYPE (HCC): Primary | ICD-10-CM

## 2019-01-15 DIAGNOSIS — C71.9 OLIGOASTROCYTOMA, WHO GRADE 2 (HCC): ICD-10-CM

## 2019-01-15 DIAGNOSIS — E78.2 MIXED HYPERLIPIDEMIA: ICD-10-CM

## 2019-01-15 DIAGNOSIS — D86.9 SARCOIDOSIS: ICD-10-CM

## 2019-01-15 DIAGNOSIS — F32.A DEPRESSION, UNSPECIFIED DEPRESSION TYPE: ICD-10-CM

## 2019-01-15 PROCEDURE — 3023F SPIROM DOC REV: CPT | Performed by: INTERNAL MEDICINE

## 2019-01-15 PROCEDURE — 99213 OFFICE O/P EST LOW 20 MIN: CPT | Performed by: INTERNAL MEDICINE

## 2019-01-15 PROCEDURE — 1036F TOBACCO NON-USER: CPT | Performed by: INTERNAL MEDICINE

## 2019-01-15 PROCEDURE — 1101F PT FALLS ASSESS-DOCD LE1/YR: CPT | Performed by: INTERNAL MEDICINE

## 2019-01-15 PROCEDURE — 3017F COLORECTAL CA SCREEN DOC REV: CPT | Performed by: INTERNAL MEDICINE

## 2019-01-15 PROCEDURE — 1090F PRES/ABSN URINE INCON ASSESS: CPT | Performed by: INTERNAL MEDICINE

## 2019-01-15 PROCEDURE — 1123F ACP DISCUSS/DSCN MKR DOCD: CPT | Performed by: INTERNAL MEDICINE

## 2019-01-15 PROCEDURE — G8417 CALC BMI ABV UP PARAM F/U: HCPCS | Performed by: INTERNAL MEDICINE

## 2019-01-15 PROCEDURE — 4040F PNEUMOC VAC/ADMIN/RCVD: CPT | Performed by: INTERNAL MEDICINE

## 2019-01-15 PROCEDURE — G8427 DOCREV CUR MEDS BY ELIG CLIN: HCPCS | Performed by: INTERNAL MEDICINE

## 2019-01-15 PROCEDURE — G8926 SPIRO NO PERF OR DOC: HCPCS | Performed by: INTERNAL MEDICINE

## 2019-01-15 PROCEDURE — G8482 FLU IMMUNIZE ORDER/ADMIN: HCPCS | Performed by: INTERNAL MEDICINE

## 2019-01-15 PROCEDURE — G8399 PT W/DXA RESULTS DOCUMENT: HCPCS | Performed by: INTERNAL MEDICINE

## 2019-01-15 RX ORDER — ATORVASTATIN CALCIUM 40 MG/1
40 TABLET, FILM COATED ORAL DAILY
Qty: 90 TABLET | Refills: 1 | Status: SHIPPED | OUTPATIENT
Start: 2019-01-15 | End: 2019-07-15 | Stop reason: SDUPTHER

## 2019-04-12 ENCOUNTER — HOSPITAL ENCOUNTER (OUTPATIENT)
Age: 69
Discharge: HOME | End: 2019-04-12
Payer: MEDICARE

## 2019-04-12 ENCOUNTER — HOSPITAL ENCOUNTER (OUTPATIENT)
Dept: GENERAL RADIOLOGY | Age: 69
Discharge: HOME | End: 2019-04-12
Payer: MEDICARE

## 2019-04-12 DIAGNOSIS — J43.9 PULMONARY EMPHYSEMA, UNSPECIFIED EMPHYSEMA TYPE (HCC): ICD-10-CM

## 2019-04-12 DIAGNOSIS — E78.2 MIXED HYPERLIPIDEMIA: ICD-10-CM

## 2019-04-12 DIAGNOSIS — D86.9 SARCOIDOSIS: ICD-10-CM

## 2019-04-12 LAB
ALBUMIN SERPL-MCNC: 4.5 GM/DL (ref 3.4–5)
ALP BLD-CCNC: 145 IU/L (ref 40–129)
ALT SERPL-CCNC: 39 U/L (ref 10–40)
ANION GAP SERPL CALCULATED.3IONS-SCNC: 8 MMOL/L (ref 4–16)
AST SERPL-CCNC: 21 IU/L (ref 15–37)
BILIRUB SERPL-MCNC: 0.5 MG/DL (ref 0–1)
BUN BLDV-MCNC: 15 MG/DL (ref 6–23)
CALCIUM SERPL-MCNC: 9.9 MG/DL (ref 8.3–10.6)
CHLORIDE BLD-SCNC: 103 MMOL/L (ref 99–110)
CHOLESTEROL, FASTING: 174 MG/DL
CO2: 32 MMOL/L (ref 21–32)
CREAT SERPL-MCNC: 1 MG/DL (ref 0.6–1.1)
GFR AFRICAN AMERICAN: >60 ML/MIN/1.73M2
GFR NON-AFRICAN AMERICAN: 55 ML/MIN/1.73M2
GLUCOSE BLD-MCNC: 135 MG/DL (ref 70–99)
HDLC SERPL-MCNC: 54 MG/DL
LDL CHOLESTEROL DIRECT: 90 MG/DL
POTASSIUM SERPL-SCNC: 4.3 MMOL/L (ref 3.5–5.1)
SODIUM BLD-SCNC: 143 MMOL/L (ref 135–145)
TOTAL PROTEIN: 8 GM/DL (ref 6.4–8.2)
TRIGLYCERIDE, FASTING: 177 MG/DL

## 2019-04-12 PROCEDURE — 36415 COLL VENOUS BLD VENIPUNCTURE: CPT

## 2019-04-12 PROCEDURE — 80053 COMPREHEN METABOLIC PANEL: CPT

## 2019-04-12 PROCEDURE — 80061 LIPID PANEL: CPT

## 2019-04-12 PROCEDURE — 71046 X-RAY EXAM CHEST 2 VIEWS: CPT

## 2019-04-15 ENCOUNTER — OFFICE VISIT (OUTPATIENT)
Dept: INTERNAL MEDICINE CLINIC | Age: 69
End: 2019-04-15
Payer: MEDICARE

## 2019-04-15 VITALS
BODY MASS INDEX: 44.22 KG/M2 | WEIGHT: 226.4 LBS | DIASTOLIC BLOOD PRESSURE: 80 MMHG | HEART RATE: 76 BPM | OXYGEN SATURATION: 98 % | RESPIRATION RATE: 16 BRPM | TEMPERATURE: 98.2 F | SYSTOLIC BLOOD PRESSURE: 134 MMHG

## 2019-04-15 DIAGNOSIS — F32.A DEPRESSION, UNSPECIFIED DEPRESSION TYPE: ICD-10-CM

## 2019-04-15 DIAGNOSIS — J44.9 CHRONIC OBSTRUCTIVE PULMONARY DISEASE, UNSPECIFIED COPD TYPE (HCC): ICD-10-CM

## 2019-04-15 DIAGNOSIS — D49.6 BRAIN NEOPLASM (HCC): ICD-10-CM

## 2019-04-15 DIAGNOSIS — R92.8 ABNORMAL MAMMOGRAM: ICD-10-CM

## 2019-04-15 DIAGNOSIS — E78.2 MIXED HYPERLIPIDEMIA: Primary | ICD-10-CM

## 2019-04-15 PROCEDURE — 3017F COLORECTAL CA SCREEN DOC REV: CPT | Performed by: INTERNAL MEDICINE

## 2019-04-15 PROCEDURE — 4040F PNEUMOC VAC/ADMIN/RCVD: CPT | Performed by: INTERNAL MEDICINE

## 2019-04-15 PROCEDURE — 1123F ACP DISCUSS/DSCN MKR DOCD: CPT | Performed by: INTERNAL MEDICINE

## 2019-04-15 PROCEDURE — 3023F SPIROM DOC REV: CPT | Performed by: INTERNAL MEDICINE

## 2019-04-15 PROCEDURE — G8926 SPIRO NO PERF OR DOC: HCPCS | Performed by: INTERNAL MEDICINE

## 2019-04-15 PROCEDURE — 1090F PRES/ABSN URINE INCON ASSESS: CPT | Performed by: INTERNAL MEDICINE

## 2019-04-15 PROCEDURE — G8399 PT W/DXA RESULTS DOCUMENT: HCPCS | Performed by: INTERNAL MEDICINE

## 2019-04-15 PROCEDURE — 1036F TOBACCO NON-USER: CPT | Performed by: INTERNAL MEDICINE

## 2019-04-15 PROCEDURE — G8417 CALC BMI ABV UP PARAM F/U: HCPCS | Performed by: INTERNAL MEDICINE

## 2019-04-15 PROCEDURE — G8427 DOCREV CUR MEDS BY ELIG CLIN: HCPCS | Performed by: INTERNAL MEDICINE

## 2019-04-15 PROCEDURE — 99213 OFFICE O/P EST LOW 20 MIN: CPT | Performed by: INTERNAL MEDICINE

## 2019-04-15 NOTE — PROGRESS NOTES
Maryanne Frausto  Patient's  is 1950  Seen in office on 4/15/2019      SUBJECTIVE:  Misti gonzales 78 y. o.year old female presents today   Chief Complaint   Patient presents with    COPD     Patient is here for follow-up of hyperlipidemia and COPD  Patient states she is feeling well and has no complaints. She had a blood test done for the lipid profile that looks good. Liver function tests were also normal.  She has COPD for which she takes Anoro and that is helping her a lot. She sees Dr. Clifford Suresh for that. No chest pain or shortness of breath. No cough or sputum production. No nausea vomiting or diarrhea. Patient had history of depression in the past and was taking medications. Sees Dr. Ada Montalvo with residential last year patient stopped taking medications. She was referred to [facility] but patient did not go there. She has stopped taking all the medications and is feeling better. Patient denies any suicidal ideations. Denies any depression. Patient states she will notify if she gets depressed. She has brain neoplasms for which she goes to neurosurgeon in [place]. Taking medications regularly. No side effects noted. Review of Systems    Review of system is normal except as in HPI    OBJECTIVE: /80   Pulse 76   Temp 98.2 °F (36.8 °C)   Resp 16   Wt 226 lb 6.4 oz (102.7 kg)   SpO2 98%   BMI 44.22 kg/m²     Wt Readings from Last 3 Encounters:   04/15/19 226 lb 6.4 oz (102.7 kg)   19 222 lb (100.7 kg)   01/15/19 218 lb (98.9 kg)      GENERAL: - Alert, oriented, pleasant, in no apparent distress. HEENT: - Conjunctiva pink, no scleral icterus. ENT clear. NECK: -Supple. No jugular venous distention noted. No masses felt,  CARDIOVASCULAR: - Normal S1 and S2    PULMONARY: - No respiratory distress. No wheezes or rales. ABDOMEN: - Soft and non-tender,no masses  ororganomegaly. EXTREMITIES: - No cyanosis, clubbing, or significant edema. SKIN: Skin is warm and dry. NEUROLOGICAL: - Cranial nerves II through XII are grossly intact. IMPRESSION:    Encounter Diagnoses   Name Primary?  Mixed hyperlipidemia Yes    Chronic obstructive pulmonary disease, unspecified COPD type ([facility]     Brain neoplasm ([facility]     Abnormal mammogram     Depression, unspecified depression type        ASSESSMENT/PLAN:    . COPD : stable. Sees Dr Lawrence STEWART. Continue same Anoro  . Hyperlipidemia : lipid good. Continue same  . Brain neoplasm : seeing Dr Lavinia Mendiola  . Abnormal mammo : pt has appt with Jordyn in 6 months   . Continue rest of the med   . Depression : feels good. Does not want to see psych    Return to office in 3 months  Return to office in 3 months. Mediations reviewed with the patient. Continue current medications. Appropriate prescriptions are addressed. After visit summeryprovided. Follow up as directed sooner if needed. Questions answered and patient verbalizes understanding. Call for any problems, questions, or concerns. Allergies   Allergen Reactions    Minocycline     Tetracyclines & Related     Demeclocycline Rash    Tetracycline Rash     Current Outpatient Medications   Medication Sig Dispense Refill    umeclidinium-vilanterol (ANORO ELLIPTA) 62.5-25 MCG/INH AEPB inhaler 1 puff daily 1 each 5    atorvastatin (LIPITOR) 40 MG tablet Take 1 tablet by mouth daily 90 tablet 1    Tetanus-Diphth-Acell Pertussis (BOOSTRIX) 5-2.5-18.5 LF-MCG/0.5 injection   0    zoster recombinant adjuvanted vaccine (SHINGRIX) 50 MCG SUSR injection 50 MCG IM then repeat 2-6 months. 0.5 mL 1     No current facility-administered medications for this visit. Past Medical History:   Diagnosis Date    Abnormal mammogram 8/14/2017    0.4 cm hypoechoic mass(most likely a small intramammary lymph node or less likely a complicated cyst.  Follow-up in 6 months recommended with ultrasound    Attempted suicide ([facility] 2012    hanged herself.     Brain neoplasm ([facility]     grade II Oligo resected 4/13, Dr. Damion Ramirez resected tumor. Dr. Blane Garza oncologist seeing pt also. No chemo. Dr. Damion Ramirez following pt q year. Tumor recurred, right frontal craniotomy and tumor resection 11/14 Dr. Lucina Orta. Had radiation to the brain     Chronic obstructive pulmonary disease ([facility] 6/15/2016    PFT showed mod sees Dr Zhang Meza RW    Cyst of left kidney 9/11/2014    MR I of 7/14. follow-up in 6-12 months    Cyst of pancreas 9/11/2014    Needs follow-up in one year 7/15    Depression     Hearing loss     Hearing loss     Herniated disc     L4 &L5    Hyperlipidemia     Kidney stones     Liver dysfunction 6/6/2014    In 7/14 hepatitis ABC negative. Immune studies negative, iron studies normal.  MRI of liver mild fatty liver.  F/u LFTS normal.    Osteopenia 9/26/14    Sarcoidosis     Skin cancer     nose     Past Surgical History:   Procedure Laterality Date    CHOLECYSTECTOMY  2006    COLONOSCOPY  2008    f/u 5 years- Dr Dariusz Ambrose  4/13    resection of brain tumor Dr. Bryan Doctor LITHOTRIPSY  2005   Mary Regan  2011    Dr Sanchez Corporal History     Tobacco Use    Smoking status: Former Smoker     Packs/day: 0.75     Years: 20.00     Pack years: 15.00     Last attempt to quit: 8/22/2003     Years since quitting: 15.6    Smokeless tobacco: Never Used   Substance Use Topics    Alcohol use: No     Alcohol/week: 0.0 oz       LAB REVIEW:  CBC:   Lab Results   Component Value Date    WBC 6.7 04/11/2014    HGB 13.8 04/11/2014    HCT 42.4 04/11/2014     04/11/2014     Lipids:   Lab Results   Component Value Date    CHOL 170 10/13/2018    TRIG 148 10/13/2018    HDL 54 04/12/2019    LDLDIRECT 90 04/12/2019    TRIGLYCFAST 177 (H) 04/12/2019     Renal:   Lab Results   Component Value Date    BUN 15 04/12/2019    CREATININE 1.0 04/12/2019     04/12/2019    K 4.3 04/12/2019    ALT 39 04/12/2019    AST 21 04/12/2019    GLUCOSE 135 04/12/2019     PT/INR:   Lab Results   Component Value Date    INR 0.91 10/14/2012     A1C:   Lab Results   Component Value Date    LABA1C 5.6 06/02/2014           Jackson Angela MD, 4/15/2019 , 2:37 PM

## 2019-06-03 NOTE — PROGRESS NOTES
Dexter Vickers presents for the evaluation of a left breast mass. Onset of the symptoms was 6 months ago. Patient sought evaluation because of an abnormal mammogram showing multiple cysts. Contributing factors include family hx on mother's side and sister with breast CA. Previous evaluation has included mammogram (mass) and ultrasound (cysts) done in November, 2018. Past Medical History:   Diagnosis Date    Abnormal mammogram 8/14/2017    0.4 cm hypoechoic mass(most likely a small intramammary lymph node or less likely a complicated cyst.  Follow-up in 6 months recommended with ultrasound    Attempted suicide ([address] 2012    hanged herself. 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 Nai Louie resected tumor. Dr. Viet Amaral oncologist seeing pt also. No chemo. Dr. Nai Louie following pt q year. Tumor recurred, right frontal craniotomy and tumor resection 11/14 Dr. Dipak Ingram. Had radiation to the brain     Chronic obstructive pulmonary disease ([address] 6/15/2016    PFT showed mod sees Dr Lopez Organ RW    Cyst of left kidney 9/11/2014    MR I of 7/14. follow-up in 6-12 months    Cyst of pancreas 9/11/2014    Needs follow-up in one year 7/15    Depression     Hearing loss     Hearing loss     Herniated disc     L4 &L5    Hyperlipidemia     Kidney stones     Liver dysfunction 6/6/2014    In 7/14 hepatitis ABC negative. Immune studies negative, iron studies normal.  MRI of liver mild fatty liver.  F/u LFTS normal.   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)      Past Surgical History:

## 2019-06-04 ENCOUNTER — TELEPHONE (OUTPATIENT)
Dept: SURGERY | Age: 69
End: 2019-06-04

## 2019-06-04 ENCOUNTER — OFFICE VISIT (OUTPATIENT)
Dept: SURGERY | Age: 69
End: 2019-06-04
Payer: MEDICARE

## 2019-06-04 VITALS — WEIGHT: 224.8 LBS | SYSTOLIC BLOOD PRESSURE: 118 MMHG | BODY MASS INDEX: 43.9 KG/M2 | DIASTOLIC BLOOD PRESSURE: 90 MMHG

## 2019-06-04 DIAGNOSIS — N63.0 BREAST MASS: Primary | ICD-10-CM

## 2019-06-04 DIAGNOSIS — N63.12 MASS OF UPPER INNER QUADRANT OF RIGHT BREAST: ICD-10-CM

## 2019-06-04 DIAGNOSIS — N63.0 LUMP OR MASS IN BREAST: Primary | ICD-10-CM

## 2019-06-04 PROCEDURE — 99213 OFFICE O/P EST LOW 20 MIN: CPT | Performed by: SURGERY

## 2019-06-04 PROCEDURE — 1036F TOBACCO NON-USER: CPT | Performed by: SURGERY

## 2019-06-04 PROCEDURE — 1090F PRES/ABSN URINE INCON ASSESS: CPT | Performed by: SURGERY

## 2019-06-04 PROCEDURE — G8399 PT W/DXA RESULTS DOCUMENT: HCPCS | Performed by: SURGERY

## 2019-06-04 PROCEDURE — G8417 CALC BMI ABV UP PARAM F/U: HCPCS | Performed by: SURGERY

## 2019-06-04 PROCEDURE — 4040F PNEUMOC VAC/ADMIN/RCVD: CPT | Performed by: SURGERY

## 2019-06-04 PROCEDURE — G8427 DOCREV CUR MEDS BY ELIG CLIN: HCPCS | Performed by: SURGERY

## 2019-06-04 PROCEDURE — 1123F ACP DISCUSS/DSCN MKR DOCD: CPT | Performed by: SURGERY

## 2019-06-04 PROCEDURE — 3017F COLORECTAL CA SCREEN DOC REV: CPT | Performed by: SURGERY

## 2019-07-15 ENCOUNTER — OFFICE VISIT (OUTPATIENT)
Dept: INTERNAL MEDICINE CLINIC | Age: 69
End: 2019-07-15
Payer: MEDICARE

## 2019-07-15 VITALS
BODY MASS INDEX: 44.17 KG/M2 | HEIGHT: 60 IN | WEIGHT: 225 LBS | DIASTOLIC BLOOD PRESSURE: 88 MMHG | HEART RATE: 103 BPM | SYSTOLIC BLOOD PRESSURE: 138 MMHG | OXYGEN SATURATION: 98 %

## 2019-07-15 DIAGNOSIS — D49.6 BRAIN NEOPLASM (HCC): ICD-10-CM

## 2019-07-15 DIAGNOSIS — R92.8 ABNORMAL MAMMOGRAM: ICD-10-CM

## 2019-07-15 DIAGNOSIS — C71.9 OLIGOASTROCYTOMA, WHO GRADE 2 (HCC): ICD-10-CM

## 2019-07-15 DIAGNOSIS — J44.9 CHRONIC OBSTRUCTIVE PULMONARY DISEASE, UNSPECIFIED COPD TYPE (HCC): ICD-10-CM

## 2019-07-15 DIAGNOSIS — D86.9 SARCOIDOSIS: ICD-10-CM

## 2019-07-15 DIAGNOSIS — E78.2 MIXED HYPERLIPIDEMIA: Primary | ICD-10-CM

## 2019-07-15 DIAGNOSIS — H91.93 BILATERAL HEARING LOSS, UNSPECIFIED HEARING LOSS TYPE: ICD-10-CM

## 2019-07-15 DIAGNOSIS — F32.A DEPRESSION, UNSPECIFIED DEPRESSION TYPE: ICD-10-CM

## 2019-07-15 PROCEDURE — 1036F TOBACCO NON-USER: CPT | Performed by: INTERNAL MEDICINE

## 2019-07-15 PROCEDURE — 99213 OFFICE O/P EST LOW 20 MIN: CPT | Performed by: INTERNAL MEDICINE

## 2019-07-15 PROCEDURE — G8427 DOCREV CUR MEDS BY ELIG CLIN: HCPCS | Performed by: INTERNAL MEDICINE

## 2019-07-15 PROCEDURE — G8399 PT W/DXA RESULTS DOCUMENT: HCPCS | Performed by: INTERNAL MEDICINE

## 2019-07-15 PROCEDURE — 1090F PRES/ABSN URINE INCON ASSESS: CPT | Performed by: INTERNAL MEDICINE

## 2019-07-15 PROCEDURE — 4040F PNEUMOC VAC/ADMIN/RCVD: CPT | Performed by: INTERNAL MEDICINE

## 2019-07-15 PROCEDURE — 1123F ACP DISCUSS/DSCN MKR DOCD: CPT | Performed by: INTERNAL MEDICINE

## 2019-07-15 PROCEDURE — 3023F SPIROM DOC REV: CPT | Performed by: INTERNAL MEDICINE

## 2019-07-15 PROCEDURE — G8926 SPIRO NO PERF OR DOC: HCPCS | Performed by: INTERNAL MEDICINE

## 2019-07-15 PROCEDURE — G8417 CALC BMI ABV UP PARAM F/U: HCPCS | Performed by: INTERNAL MEDICINE

## 2019-07-15 PROCEDURE — 3017F COLORECTAL CA SCREEN DOC REV: CPT | Performed by: INTERNAL MEDICINE

## 2019-07-15 RX ORDER — ATORVASTATIN CALCIUM 40 MG/1
40 TABLET, FILM COATED ORAL DAILY
Qty: 90 TABLET | Refills: 1 | Status: SHIPPED | OUTPATIENT
Start: 2019-07-15 | End: 2020-01-23 | Stop reason: SDUPTHER

## 2019-07-25 ENCOUNTER — HOSPITAL ENCOUNTER (OUTPATIENT)
Dept: ULTRASOUND IMAGING | Age: 69
Discharge: HOME | End: 2019-07-25
Payer: MEDICARE

## 2019-07-25 ENCOUNTER — HOSPITAL ENCOUNTER (OUTPATIENT)
Dept: MAMMOGRAPHY | Age: 69
Discharge: HOME | End: 2019-07-25
Payer: MEDICARE

## 2019-07-25 DIAGNOSIS — N63.12 MASS OF UPPER INNER QUADRANT OF RIGHT BREAST: ICD-10-CM

## 2019-07-25 DIAGNOSIS — R92.8 ABNORMAL MAMMOGRAM: ICD-10-CM

## 2019-07-25 DIAGNOSIS — N63.0 BREAST MASS: ICD-10-CM

## 2019-07-25 PROCEDURE — 76642 ULTRASOUND BREAST LIMITED: CPT

## 2019-07-30 ENCOUNTER — OFFICE VISIT (OUTPATIENT)
Dept: SURGERY | Age: 69
End: 2019-07-30
Payer: MEDICARE

## 2019-07-30 VITALS
WEIGHT: 225.4 LBS | BODY MASS INDEX: 44.02 KG/M2 | OXYGEN SATURATION: 97 % | DIASTOLIC BLOOD PRESSURE: 82 MMHG | HEART RATE: 108 BPM | RESPIRATION RATE: 22 BRPM | SYSTOLIC BLOOD PRESSURE: 114 MMHG

## 2019-07-30 DIAGNOSIS — N63.0 LUMP OR MASS IN BREAST: Primary | ICD-10-CM

## 2019-07-30 PROCEDURE — G8399 PT W/DXA RESULTS DOCUMENT: HCPCS | Performed by: SURGERY

## 2019-07-30 PROCEDURE — G8427 DOCREV CUR MEDS BY ELIG CLIN: HCPCS | Performed by: SURGERY

## 2019-07-30 PROCEDURE — 1123F ACP DISCUSS/DSCN MKR DOCD: CPT | Performed by: SURGERY

## 2019-07-30 PROCEDURE — 4040F PNEUMOC VAC/ADMIN/RCVD: CPT | Performed by: SURGERY

## 2019-07-30 PROCEDURE — 1090F PRES/ABSN URINE INCON ASSESS: CPT | Performed by: SURGERY

## 2019-07-30 PROCEDURE — 99213 OFFICE O/P EST LOW 20 MIN: CPT | Performed by: SURGERY

## 2019-07-30 PROCEDURE — 3017F COLORECTAL CA SCREEN DOC REV: CPT | Performed by: SURGERY

## 2019-07-30 PROCEDURE — G8417 CALC BMI ABV UP PARAM F/U: HCPCS | Performed by: SURGERY

## 2019-07-30 PROCEDURE — 1036F TOBACCO NON-USER: CPT | Performed by: SURGERY

## 2019-10-09 ENCOUNTER — NURSE ONLY (OUTPATIENT)
Dept: INTERNAL MEDICINE CLINIC | Age: 69
End: 2019-10-09
Payer: MEDICARE

## 2019-10-09 DIAGNOSIS — E78.2 MIXED HYPERLIPIDEMIA: ICD-10-CM

## 2019-10-09 LAB
A/G RATIO: 2.1 (ref 1.1–2.2)
ALBUMIN SERPL-MCNC: 5 G/DL (ref 3.4–5)
ALP BLD-CCNC: 167 U/L (ref 40–129)
ALT SERPL-CCNC: 58 U/L (ref 10–40)
ANION GAP SERPL CALCULATED.3IONS-SCNC: 19 MMOL/L (ref 3–16)
AST SERPL-CCNC: 32 U/L (ref 15–37)
BASOPHILS ABSOLUTE: 0 K/UL (ref 0–0.2)
BASOPHILS RELATIVE PERCENT: 0.6 %
BILIRUB SERPL-MCNC: 0.6 MG/DL (ref 0–1)
BUN BLDV-MCNC: 13 MG/DL (ref 7–20)
CALCIUM SERPL-MCNC: 9.8 MG/DL (ref 8.3–10.6)
CHLORIDE BLD-SCNC: 98 MMOL/L (ref 99–110)
CHOLESTEROL, FASTING: 166 MG/DL (ref 0–199)
CO2: 22 MMOL/L (ref 21–32)
CREAT SERPL-MCNC: 0.8 MG/DL (ref 0.6–1.2)
EOSINOPHILS ABSOLUTE: 0.1 K/UL (ref 0–0.6)
EOSINOPHILS RELATIVE PERCENT: 1.4 %
GFR AFRICAN AMERICAN: >60
GFR NON-AFRICAN AMERICAN: >60
GLOBULIN: 2.4 G/DL
GLUCOSE BLD-MCNC: 128 MG/DL (ref 70–99)
HCT VFR BLD CALC: 44 % (ref 36–48)
HDLC SERPL-MCNC: 52 MG/DL (ref 40–60)
HEMOGLOBIN: 14.6 G/DL (ref 12–16)
LDL CHOLESTEROL CALCULATED: 73 MG/DL
LYMPHOCYTES ABSOLUTE: 1.2 K/UL (ref 1–5.1)
LYMPHOCYTES RELATIVE PERCENT: 15.9 %
MCH RBC QN AUTO: 27.9 PG (ref 26–34)
MCHC RBC AUTO-ENTMCNC: 33.1 G/DL (ref 31–36)
MCV RBC AUTO: 84.3 FL (ref 80–100)
MONOCYTES ABSOLUTE: 0.5 K/UL (ref 0–1.3)
MONOCYTES RELATIVE PERCENT: 6.7 %
NEUTROPHILS ABSOLUTE: 5.6 K/UL (ref 1.7–7.7)
NEUTROPHILS RELATIVE PERCENT: 75.4 %
PDW BLD-RTO: 14.6 % (ref 12.4–15.4)
PLATELET # BLD: 328 K/UL (ref 135–450)
PMV BLD AUTO: 9.1 FL (ref 5–10.5)
POTASSIUM SERPL-SCNC: 4.6 MMOL/L (ref 3.5–5.1)
RBC # BLD: 5.21 M/UL (ref 4–5.2)
SODIUM BLD-SCNC: 139 MMOL/L (ref 136–145)
TOTAL PROTEIN: 7.4 G/DL (ref 6.4–8.2)
TRIGLYCERIDE, FASTING: 206 MG/DL (ref 0–150)
VLDLC SERPL CALC-MCNC: 41 MG/DL
WBC # BLD: 7.4 K/UL (ref 4–11)

## 2019-10-09 PROCEDURE — 36415 COLL VENOUS BLD VENIPUNCTURE: CPT | Performed by: INTERNAL MEDICINE

## 2019-10-15 ENCOUNTER — OFFICE VISIT (OUTPATIENT)
Dept: INTERNAL MEDICINE CLINIC | Age: 69
End: 2019-10-15
Payer: MEDICARE

## 2019-10-15 VITALS
DIASTOLIC BLOOD PRESSURE: 72 MMHG | RESPIRATION RATE: 16 BRPM | HEART RATE: 88 BPM | OXYGEN SATURATION: 98 % | BODY MASS INDEX: 44.45 KG/M2 | TEMPERATURE: 97.5 F | SYSTOLIC BLOOD PRESSURE: 134 MMHG | WEIGHT: 227.6 LBS

## 2019-10-15 DIAGNOSIS — J44.9 CHRONIC OBSTRUCTIVE PULMONARY DISEASE, UNSPECIFIED COPD TYPE (HCC): ICD-10-CM

## 2019-10-15 DIAGNOSIS — F32.A DEPRESSION, UNSPECIFIED DEPRESSION TYPE: ICD-10-CM

## 2019-10-15 DIAGNOSIS — D86.9 SARCOIDOSIS: ICD-10-CM

## 2019-10-15 DIAGNOSIS — K76.89 LIVER DYSFUNCTION: Primary | ICD-10-CM

## 2019-10-15 DIAGNOSIS — D49.6 BRAIN NEOPLASM (HCC): ICD-10-CM

## 2019-10-15 DIAGNOSIS — E78.2 MIXED HYPERLIPIDEMIA: ICD-10-CM

## 2019-10-15 PROCEDURE — G8399 PT W/DXA RESULTS DOCUMENT: HCPCS | Performed by: INTERNAL MEDICINE

## 2019-10-15 PROCEDURE — 4040F PNEUMOC VAC/ADMIN/RCVD: CPT | Performed by: INTERNAL MEDICINE

## 2019-10-15 PROCEDURE — 1123F ACP DISCUSS/DSCN MKR DOCD: CPT | Performed by: INTERNAL MEDICINE

## 2019-10-15 PROCEDURE — 1036F TOBACCO NON-USER: CPT | Performed by: INTERNAL MEDICINE

## 2019-10-15 PROCEDURE — 99213 OFFICE O/P EST LOW 20 MIN: CPT | Performed by: INTERNAL MEDICINE

## 2019-10-15 PROCEDURE — 3017F COLORECTAL CA SCREEN DOC REV: CPT | Performed by: INTERNAL MEDICINE

## 2019-10-15 PROCEDURE — G8926 SPIRO NO PERF OR DOC: HCPCS | Performed by: INTERNAL MEDICINE

## 2019-10-15 PROCEDURE — G8417 CALC BMI ABV UP PARAM F/U: HCPCS | Performed by: INTERNAL MEDICINE

## 2019-10-15 PROCEDURE — G8482 FLU IMMUNIZE ORDER/ADMIN: HCPCS | Performed by: INTERNAL MEDICINE

## 2019-10-15 PROCEDURE — G8427 DOCREV CUR MEDS BY ELIG CLIN: HCPCS | Performed by: INTERNAL MEDICINE

## 2019-10-15 PROCEDURE — G0008 ADMIN INFLUENZA VIRUS VAC: HCPCS | Performed by: INTERNAL MEDICINE

## 2019-10-15 PROCEDURE — 3023F SPIROM DOC REV: CPT | Performed by: INTERNAL MEDICINE

## 2019-10-15 PROCEDURE — 1090F PRES/ABSN URINE INCON ASSESS: CPT | Performed by: INTERNAL MEDICINE

## 2019-10-15 PROCEDURE — 90653 IIV ADJUVANT VACCINE IM: CPT | Performed by: INTERNAL MEDICINE

## 2019-11-19 ENCOUNTER — HOSPITAL ENCOUNTER (OUTPATIENT)
Dept: ULTRASOUND IMAGING | Age: 69
Discharge: HOME | End: 2019-11-19
Payer: MEDICARE

## 2019-11-19 DIAGNOSIS — K76.89 LIVER DYSFUNCTION: ICD-10-CM

## 2019-11-19 DIAGNOSIS — E78.2 MIXED HYPERLIPIDEMIA: ICD-10-CM

## 2019-11-19 PROCEDURE — 76705 ECHO EXAM OF ABDOMEN: CPT

## 2019-12-23 ENCOUNTER — TELEPHONE (OUTPATIENT)
Dept: INTERNAL MEDICINE CLINIC | Age: 69
End: 2019-12-23

## 2020-01-02 ENCOUNTER — OFFICE VISIT (OUTPATIENT)
Dept: INTERNAL MEDICINE CLINIC | Age: 70
End: 2020-01-02
Payer: MEDICARE

## 2020-01-02 VITALS
DIASTOLIC BLOOD PRESSURE: 92 MMHG | RESPIRATION RATE: 16 BRPM | WEIGHT: 223.8 LBS | SYSTOLIC BLOOD PRESSURE: 140 MMHG | OXYGEN SATURATION: 95 % | BODY MASS INDEX: 43.71 KG/M2 | TEMPERATURE: 98.1 F | HEART RATE: 102 BPM

## 2020-01-02 PROCEDURE — 4040F PNEUMOC VAC/ADMIN/RCVD: CPT | Performed by: INTERNAL MEDICINE

## 2020-01-02 PROCEDURE — G8417 CALC BMI ABV UP PARAM F/U: HCPCS | Performed by: INTERNAL MEDICINE

## 2020-01-02 PROCEDURE — G8482 FLU IMMUNIZE ORDER/ADMIN: HCPCS | Performed by: INTERNAL MEDICINE

## 2020-01-02 PROCEDURE — 3023F SPIROM DOC REV: CPT | Performed by: INTERNAL MEDICINE

## 2020-01-02 PROCEDURE — 1123F ACP DISCUSS/DSCN MKR DOCD: CPT | Performed by: INTERNAL MEDICINE

## 2020-01-02 PROCEDURE — G8427 DOCREV CUR MEDS BY ELIG CLIN: HCPCS | Performed by: INTERNAL MEDICINE

## 2020-01-02 PROCEDURE — G8399 PT W/DXA RESULTS DOCUMENT: HCPCS | Performed by: INTERNAL MEDICINE

## 2020-01-02 PROCEDURE — 1090F PRES/ABSN URINE INCON ASSESS: CPT | Performed by: INTERNAL MEDICINE

## 2020-01-02 PROCEDURE — 3017F COLORECTAL CA SCREEN DOC REV: CPT | Performed by: INTERNAL MEDICINE

## 2020-01-02 PROCEDURE — G8926 SPIRO NO PERF OR DOC: HCPCS | Performed by: INTERNAL MEDICINE

## 2020-01-02 PROCEDURE — 99213 OFFICE O/P EST LOW 20 MIN: CPT | Performed by: INTERNAL MEDICINE

## 2020-01-02 PROCEDURE — 1036F TOBACCO NON-USER: CPT | Performed by: INTERNAL MEDICINE

## 2020-01-13 ENCOUNTER — HOSPITAL ENCOUNTER (OUTPATIENT)
Dept: MAMMOGRAPHY | Age: 70
Discharge: HOME | End: 2020-01-13
Payer: MEDICARE

## 2020-01-13 ENCOUNTER — HOSPITAL ENCOUNTER (OUTPATIENT)
Dept: MRI IMAGING | Age: 70
Discharge: HOME | End: 2020-01-13
Payer: MEDICARE

## 2020-01-13 ENCOUNTER — HOSPITAL ENCOUNTER (OUTPATIENT)
Age: 70
Discharge: HOME | End: 2020-01-13
Payer: MEDICARE

## 2020-01-13 LAB
ALBUMIN SERPL-MCNC: 4.6 GM/DL (ref 3.4–5)
ALP BLD-CCNC: 170 IU/L (ref 40–129)
ALT SERPL-CCNC: 61 U/L (ref 10–40)
AST SERPL-CCNC: 33 IU/L (ref 15–37)
BILIRUB SERPL-MCNC: 0.4 MG/DL (ref 0–1)
BILIRUBIN DIRECT: 0.2 MG/DL (ref 0–0.3)
BILIRUBIN, INDIRECT: 0.2 MG/DL (ref 0–0.7)
CREAT SERPL-MCNC: 1 MG/DL (ref 0.6–1.1)
GFR AFRICAN AMERICAN: >60 ML/MIN/1.73M2
GFR NON-AFRICAN AMERICAN: 55 ML/MIN/1.73M2
TOTAL PROTEIN: 8 GM/DL (ref 6.4–8.2)

## 2020-01-13 PROCEDURE — 80076 HEPATIC FUNCTION PANEL: CPT

## 2020-01-13 PROCEDURE — 82565 ASSAY OF CREATININE: CPT

## 2020-01-13 PROCEDURE — 36415 COLL VENOUS BLD VENIPUNCTURE: CPT

## 2020-01-13 PROCEDURE — A9579 GAD-BASE MR CONTRAST NOS,1ML: HCPCS | Performed by: INTERNAL MEDICINE

## 2020-01-13 PROCEDURE — 6360000004 HC RX CONTRAST MEDICATION: Performed by: INTERNAL MEDICINE

## 2020-01-13 PROCEDURE — 74183 MRI ABD W/O CNTR FLWD CNTR: CPT

## 2020-01-13 RX ADMIN — GADOTERIDOL 20 ML: 279.3 INJECTION, SOLUTION INTRAVENOUS at 15:50

## 2020-01-13 NOTE — PROGRESS NOTES
Michelle from hospital called needing new order for diagnostic bilateral mammogram for breast mass.  Please fax to [phone]

## 2020-01-16 ENCOUNTER — HOSPITAL ENCOUNTER (OUTPATIENT)
Dept: MAMMOGRAPHY | Age: 70
Discharge: HOME | End: 2020-01-16
Payer: MEDICARE

## 2020-01-16 ENCOUNTER — HOSPITAL ENCOUNTER (OUTPATIENT)
Dept: ULTRASOUND IMAGING | Age: 70
Discharge: HOME | End: 2020-01-16
Payer: MEDICARE

## 2020-01-16 PROCEDURE — 77066 DX MAMMO INCL CAD BI: CPT

## 2020-01-16 PROCEDURE — 76642 ULTRASOUND BREAST LIMITED: CPT

## 2020-01-23 RX ORDER — ATORVASTATIN CALCIUM 40 MG/1
40 TABLET, FILM COATED ORAL DAILY
Qty: 90 TABLET | Refills: 1 | Status: SHIPPED | OUTPATIENT
Start: 2020-01-23 | End: 2020-07-20 | Stop reason: SDUPTHER

## 2020-02-05 ENCOUNTER — OFFICE VISIT (OUTPATIENT)
Dept: INTERNAL MEDICINE CLINIC | Age: 70
End: 2020-02-05
Payer: MEDICARE

## 2020-02-05 ENCOUNTER — TELEPHONE (OUTPATIENT)
Dept: INTERNAL MEDICINE CLINIC | Age: 70
End: 2020-02-05

## 2020-02-05 VITALS
WEIGHT: 223.2 LBS | SYSTOLIC BLOOD PRESSURE: 138 MMHG | TEMPERATURE: 98.5 F | HEART RATE: 96 BPM | RESPIRATION RATE: 16 BRPM | OXYGEN SATURATION: 97 % | DIASTOLIC BLOOD PRESSURE: 88 MMHG | BODY MASS INDEX: 36.03 KG/M2

## 2020-02-05 PROCEDURE — G8926 SPIRO NO PERF OR DOC: HCPCS | Performed by: INTERNAL MEDICINE

## 2020-02-05 PROCEDURE — 3017F COLORECTAL CA SCREEN DOC REV: CPT | Performed by: INTERNAL MEDICINE

## 2020-02-05 PROCEDURE — 4040F PNEUMOC VAC/ADMIN/RCVD: CPT | Performed by: INTERNAL MEDICINE

## 2020-02-05 PROCEDURE — G8427 DOCREV CUR MEDS BY ELIG CLIN: HCPCS | Performed by: INTERNAL MEDICINE

## 2020-02-05 PROCEDURE — G8482 FLU IMMUNIZE ORDER/ADMIN: HCPCS | Performed by: INTERNAL MEDICINE

## 2020-02-05 PROCEDURE — 3023F SPIROM DOC REV: CPT | Performed by: INTERNAL MEDICINE

## 2020-02-05 PROCEDURE — 99213 OFFICE O/P EST LOW 20 MIN: CPT | Performed by: INTERNAL MEDICINE

## 2020-02-05 PROCEDURE — 1090F PRES/ABSN URINE INCON ASSESS: CPT | Performed by: INTERNAL MEDICINE

## 2020-02-05 PROCEDURE — G8417 CALC BMI ABV UP PARAM F/U: HCPCS | Performed by: INTERNAL MEDICINE

## 2020-02-05 PROCEDURE — 1036F TOBACCO NON-USER: CPT | Performed by: INTERNAL MEDICINE

## 2020-02-05 PROCEDURE — 1123F ACP DISCUSS/DSCN MKR DOCD: CPT | Performed by: INTERNAL MEDICINE

## 2020-02-05 PROCEDURE — G8399 PT W/DXA RESULTS DOCUMENT: HCPCS | Performed by: INTERNAL MEDICINE

## 2020-02-05 NOTE — PROGRESS NOTES
Aida Angela  Patient's  is 1950  Seen in office on 2020      SUBJECTIVE:  Velia Lyle christian 78 y. o.year old female presents today   Chief Complaint   Patient presents with    1 Month Follow-Up    Other     saw neurologist 2020, states that AWV was completed at her home last week with ins co.     Pt is here for f/u of liver dysfunction :  No abdominal pain. No fever or chills. Abnormal mammo : new lesion right breast : bx scheduled for 20  MRI abdomen : fatty liver and fibrosis  Brain tumor : stable. Repeat MRI in 6 months  Rest stable. Pt is feeling well  Has no  Complaints   Taking medications regularly. No side effects noted. Review of Systems    OBJECTIVE: BP (!) 138/90   Pulse 96   Temp 98.5 °F (36.9 °C) (Oral)   Resp 16   Wt 223 lb 3.2 oz (101.2 kg)   SpO2 97%   BMI 36.03 kg/m²     Wt Readings from Last 3 Encounters:   20 223 lb 3.2 oz (101.2 kg)   20 219 lb (99.3 kg)   20 223 lb 12.8 oz (101.5 kg)      GENERAL: - Alert, oriented, pleasant, in no apparent distress. HEENT: - Conjunctiva pink, no scleral icterus. ENT clear. NECK: -Supple. No jugular venous distention noted. No masses felt,  CARDIOVASCULAR: - Normal S1 and S2    PULMONARY: - No respiratory distress. No wheezes or rales. ABDOMEN: - Soft and non-tender,no masses  ororganomegaly. EXTREMITIES: - No cyanosis, clubbing, or significant edema. SKIN: Skin is warm and dry. NEUROLOGICAL: - Cranial nerves II through XII are grossly intact. IMPRESSION:    Encounter Diagnoses   Name Primary?     Liver dysfunction Yes    Abnormal mammogram     Brain neoplasm (HCC)     Chronic obstructive pulmonary disease, unspecified COPD type ([address]     Hepatic steatosis     Sarcoidosis     Oligoastrocytoma, WHO grade 2 (HCC)     Mixed hyperlipidemia     Bilateral hearing loss, unspecified hearing loss type        ASSESSMENT/PLAN:    COPD : stabl.e cont same  Hepatic steatosis with fibrosis  : no alcohol. Refer to GI. Liver dysfunction : refer to GI   Abnormal Mammo : bx pending. Brain neoplasm : stable. Sees neurosurgeon. Hyperlipidemia is stable. Follow low cholesterol diet. Continue current treatment. monitor LFTA  Sarcoidosis : Patient is stable. Continue current treatment. Mediations reviewed with the patient. Continue current medications. Appropriate prescriptions are addressed. After visit summeryprovided. Follow up as directed sooner if needed. Questions answered and patient verbalizes understanding. Call for any problems, questions, or concerns. Allergies   Allergen Reactions    Minocycline     Tetracyclines & Related     Demeclocycline Rash    Tetracycline Rash     Current Outpatient Medications   Medication Sig Dispense Refill    umeclidinium-vilanterol (ANORO ELLIPTA) 62.5-25 MCG/INH AEPB inhaler 1 puff daily 1 each 5    atorvastatin (LIPITOR) 40 MG tablet Take 1 tablet by mouth daily 90 tablet 1    Tetanus-Diphth-Acell Pertussis (BOOSTRIX) 5-2.5-18.5 LF-MCG/0.5 injection   0     No current facility-administered medications for this visit. Past Medical History:   Diagnosis Date    Abnormal mammogram 8/14/2017    0.4 cm hypoechoic mass(most likely a small intramammary lymph node or less likely a complicated cyst.  Follow-up in 6 months recommended with ultrasound    Attempted suicide ([address] 2012    hanged herself. 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 Isiah Alva resected tumor. Dr. Demian Stevens oncologist seeing pt also. No chemo. Dr. Isiah Alva following pt q year. Tumor recurred, right frontal craniotomy and tumor resection 11/14 Dr. Vic Medley. Had radiation to the brain     Chronic obstructive pulmonary disease ([address] 6/15/2016    PFT showed mod sees Dr So Aparicio RW    Cyst of left kidney 9/11/2014    MR I of 7/14.  follow-up in 6-12 months    Cyst of pancreas 9/11/2014    Needs follow-up in one year 7/15    Depression     Hearing loss     Hearing loss

## 2020-02-07 ENCOUNTER — HOSPITAL ENCOUNTER (OUTPATIENT)
Dept: GENERAL RADIOLOGY | Age: 70
Discharge: HOME | End: 2020-02-07
Payer: MEDICARE

## 2020-02-07 ENCOUNTER — HOSPITAL ENCOUNTER (OUTPATIENT)
Age: 70
Discharge: HOME | End: 2020-02-07
Payer: MEDICARE

## 2020-02-07 LAB
ALBUMIN SERPL-MCNC: 4.4 GM/DL (ref 3.4–5)
ALP BLD-CCNC: 182 IU/L (ref 40–129)
ALT SERPL-CCNC: 65 U/L (ref 10–40)
AST SERPL-CCNC: 43 IU/L (ref 15–37)
BILIRUB SERPL-MCNC: 0.4 MG/DL (ref 0–1)
BILIRUBIN DIRECT: 0.2 MG/DL (ref 0–0.3)
BILIRUBIN, INDIRECT: 0.2 MG/DL (ref 0–0.7)
FERRITIN: 241 NG/ML (ref 15–150)
IGG,SERUM: 1054 MG/DL (ref 723–1685)
IGM,SERUM: 87 MG/DL (ref 62–277)
IRON: 57 UG/DL (ref 37–145)
PCT TRANSFERRIN: 17 % (ref 10–44)
TOTAL IRON BINDING CAPACITY: 340 UG/DL (ref 250–450)
TOTAL PROTEIN: 7.7 GM/DL (ref 6.4–8.2)
UNSATURATED IRON BINDING CAPACITY: 283 UG/DL (ref 110–370)

## 2020-02-07 PROCEDURE — 83550 IRON BINDING TEST: CPT

## 2020-02-07 PROCEDURE — 86038 ANTINUCLEAR ANTIBODIES: CPT

## 2020-02-07 PROCEDURE — 82784 ASSAY IGA/IGD/IGG/IGM EACH: CPT

## 2020-02-07 PROCEDURE — 82728 ASSAY OF FERRITIN: CPT

## 2020-02-07 PROCEDURE — 71046 X-RAY EXAM CHEST 2 VIEWS: CPT

## 2020-02-07 PROCEDURE — 83540 ASSAY OF IRON: CPT

## 2020-02-07 PROCEDURE — 80076 HEPATIC FUNCTION PANEL: CPT

## 2020-02-07 PROCEDURE — 86256 FLUORESCENT ANTIBODY TITER: CPT

## 2020-02-07 PROCEDURE — 36415 COLL VENOUS BLD VENIPUNCTURE: CPT

## 2020-02-07 PROCEDURE — 83516 IMMUNOASSAY NONANTIBODY: CPT

## 2020-02-10 LAB
ANTI-MITOCHON TITER: 2.7 UNITS (ref 0–20)
ANTI-MITOCHON TITER: NORMAL UNITS (ref 0–20)
ANTI-NUCLEAR ANTIBODY (ANA): NORMAL
ANTI-NUCLEAR ANTIBODY (ANA): NORMAL
F-ACTIN AB, IGG: 2 UNITS (ref 0–19)
F-ACTIN AB, IGG: NORMAL UNITS (ref 0–19)

## 2020-02-20 ENCOUNTER — HOSPITAL ENCOUNTER (OUTPATIENT)
Dept: MAMMOGRAPHY | Age: 70
Discharge: HOME | End: 2020-02-20
Payer: MEDICARE

## 2020-02-20 ENCOUNTER — HOSPITAL ENCOUNTER (OUTPATIENT)
Dept: ULTRASOUND IMAGING | Age: 70
Discharge: HOME | End: 2020-02-20
Payer: MEDICARE

## 2020-02-20 PROCEDURE — 77065 DX MAMMO INCL CAD UNI: CPT

## 2020-02-20 PROCEDURE — 88341 IMHCHEM/IMCYTCHM EA ADD ANTB: CPT

## 2020-02-20 PROCEDURE — 88360 TUMOR IMMUNOHISTOCHEM/MANUAL: CPT

## 2020-02-20 PROCEDURE — 88342 IMHCHEM/IMCYTCHM 1ST ANTB: CPT

## 2020-02-20 PROCEDURE — 88305 TISSUE EXAM BY PATHOLOGIST: CPT

## 2020-02-20 PROCEDURE — 2720000010 US BREAST BIOPSY W LOC DEVICE 1ST LESION RIGHT

## 2020-02-25 ENCOUNTER — TELEPHONE (OUTPATIENT)
Dept: INTERNAL MEDICINE CLINIC | Age: 70
End: 2020-02-25

## 2020-02-25 NOTE — TELEPHONE ENCOUNTER
Message from [place], [facility] Nicole at [address] [place] [address] stating that breast bx on patient 2- and is in need of a surgical consult and they are wanting to know the surgeon is that she is being referred to ?  Please advise

## 2020-03-02 NOTE — PROGRESS NOTES
Gilberto Alfonso presents for the evaluation of a breast mass. Onset of the symptoms was 2  weeks ago. Patient sought evaluation because of an abnormal mammogram.  Contributing factors include sister with breast CA. Previous evaluation has included breast biopsy (proliferative lesion with atypia) and mammogram (mass). Past Medical History:   Diagnosis Date    Abnormal mammogram 8/14/2017    0.4 cm hypoechoic mass(most likely a small intramammary lymph node or less likely a complicated cyst.  Follow-up in 6 months recommended with ultrasound    Attempted suicide ([address] 2012    hanged herself. 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 Jessee Duverney resected tumor. Dr. Hodge [facility] oncologist seeing pt also. No chemo. Dr. Jessee Duverney following pt q year. Tumor recurred, right frontal craniotomy and tumor resection 11/14 Dr. Asael Gillette. Had radiation to the brain     Chronic obstructive pulmonary disease ([address] 6/15/2016    PFT showed mod sees Dr Ashley STEWART    Cyst of left kidney 9/11/2014    MR I of 7/14. follow-up in 6-12 months    Cyst of pancreas 9/11/2014    Needs follow-up in one year 7/15    Depression     Hearing loss     Hearing loss     Herniated disc     L4 &L5    Hyperlipidemia     Kidney stones     Liver dysfunction 6/6/2014    In 7/14 hepatitis ABC negative. Immune studies negative, iron studies normal.  MRI of liver mild fatty liver.  F/u LFTS normal.    Osteopenia 9/26/14    Sarcoidosis     Skin cancer     nose     Patient Active Problem List    Diagnosis Date Noted    Abnormal mammogram 08/14/2017    Oligoastrocytoma, WHO grade 2 ([address] 12/19/2016    Chronic obstructive pulmonary disease ([address] 06/15/2016    Cyst of left kidney 09/11/2014    Cyst of pancreas 09/11/2014    Liver dysfunction 06/06/2014    Mixed hyperlipidemia     Sarcoidosis     Depression     Hearing loss     Brain neoplasm [facility])      Past Surgical History:   Procedure Laterality Date    CHOLECYSTECTOMY  2006  COLONOSCOPY      f/u 5 years- Dr Shane Huizar      resection of brain tumor Dr. Ramiro Koch LITHOTRIPSY      Elias Elias      Dr Hossein Disla History   Problem Relation Age of Onset    Heart Attack Mother     Cancer Father         leukemia     Social History     Socioeconomic History    Marital status:      Spouse name: None    Number of children: None    Years of education: None    Highest education level: None   Occupational History    None   Social Needs    Financial resource strain: None    Food insecurity:     Worry: None     Inability: None    Transportation needs:     Medical: None     Non-medical: None   Tobacco Use    Smoking status: Former Smoker     Packs/day: 0.75     Years: 20.00     Pack years: 15.00     Last attempt to quit: 2003     Years since quittin.5    Smokeless tobacco: Never Used   Substance and Sexual Activity    Alcohol use: No     Alcohol/week: 0.0 standard drinks    Drug use: No    Sexual activity: Yes     Partners: Male   Lifestyle    Physical activity:     Days per week: None     Minutes per session: None    Stress: None   Relationships    Social connections:     Talks on phone: None     Gets together: None     Attends Jain service: None     Active member of club or organization: None     Attends meetings of clubs or organizations: None     Relationship status: None    Intimate partner violence:     Fear of current or ex partner: None     Emotionally abused: None     Physically abused: None     Forced sexual activity: None   Other Topics Concern    None   Social History Narrative    None     Current Outpatient Medications   Medication Sig Dispense Refill    umeclidinium-vilanterol (ANORO ELLIPTA) 62.5-25 MCG/INH AEPB inhaler 1 puff daily 1 each 5    atorvastatin (LIPITOR) 40 MG tablet Take 1 tablet by mouth daily 90 tablet 1     No current

## 2020-03-03 ENCOUNTER — OFFICE VISIT (OUTPATIENT)
Dept: SURGERY | Age: 70
End: 2020-03-03
Payer: MEDICARE

## 2020-03-03 VITALS
WEIGHT: 222.6 LBS | SYSTOLIC BLOOD PRESSURE: 142 MMHG | RESPIRATION RATE: 28 BRPM | BODY MASS INDEX: 35.93 KG/M2 | DIASTOLIC BLOOD PRESSURE: 70 MMHG | HEART RATE: 108 BPM

## 2020-03-03 PROCEDURE — 3017F COLORECTAL CA SCREEN DOC REV: CPT | Performed by: SURGERY

## 2020-03-03 PROCEDURE — G8482 FLU IMMUNIZE ORDER/ADMIN: HCPCS | Performed by: SURGERY

## 2020-03-03 PROCEDURE — 1036F TOBACCO NON-USER: CPT | Performed by: SURGERY

## 2020-03-03 PROCEDURE — G8417 CALC BMI ABV UP PARAM F/U: HCPCS | Performed by: SURGERY

## 2020-03-03 PROCEDURE — G8399 PT W/DXA RESULTS DOCUMENT: HCPCS | Performed by: SURGERY

## 2020-03-03 PROCEDURE — 4040F PNEUMOC VAC/ADMIN/RCVD: CPT | Performed by: SURGERY

## 2020-03-03 PROCEDURE — 1090F PRES/ABSN URINE INCON ASSESS: CPT | Performed by: SURGERY

## 2020-03-03 PROCEDURE — 1123F ACP DISCUSS/DSCN MKR DOCD: CPT | Performed by: SURGERY

## 2020-03-03 PROCEDURE — G8427 DOCREV CUR MEDS BY ELIG CLIN: HCPCS | Performed by: SURGERY

## 2020-03-03 PROCEDURE — 99213 OFFICE O/P EST LOW 20 MIN: CPT | Performed by: SURGERY

## 2020-03-04 ENCOUNTER — ANESTHESIA EVENT (OUTPATIENT)
Dept: OPERATING ROOM | Age: 70
End: 2020-03-04
Payer: MEDICARE

## 2020-03-04 ENCOUNTER — TELEPHONE (OUTPATIENT)
Dept: ORTHOPEDIC SURGERY | Age: 70
End: 2020-03-04

## 2020-03-04 NOTE — TELEPHONE ENCOUNTER
Called [phone] spoke with Serafin Landau. No precert is required for an out-patient procedure 19125.  Right Breast Biopsy & NL. Paddy Jose

## 2020-03-05 ENCOUNTER — HOSPITAL ENCOUNTER (OUTPATIENT)
Dept: ULTRASOUND IMAGING | Age: 70
Discharge: HOME | End: 2020-03-05
Payer: MEDICARE

## 2020-03-05 ENCOUNTER — HOSPITAL ENCOUNTER (OUTPATIENT)
Age: 70
Setting detail: OUTPATIENT SURGERY
Discharge: HOME | End: 2020-03-05
Attending: SURGERY | Admitting: SURGERY
Payer: MEDICARE

## 2020-03-05 ENCOUNTER — ANESTHESIA (OUTPATIENT)
Dept: OPERATING ROOM | Age: 70
End: 2020-03-05
Payer: MEDICARE

## 2020-03-05 ENCOUNTER — APPOINTMENT (OUTPATIENT)
Dept: MAMMOGRAPHY | Age: 70
End: 2020-03-05
Attending: SURGERY
Payer: MEDICARE

## 2020-03-05 ENCOUNTER — HOSPITAL ENCOUNTER (OUTPATIENT)
Dept: MAMMOGRAPHY | Age: 70
Discharge: HOME | End: 2020-03-05
Payer: MEDICARE

## 2020-03-05 VITALS
SYSTOLIC BLOOD PRESSURE: 105 MMHG | OXYGEN SATURATION: 100 % | RESPIRATION RATE: 9 BRPM | DIASTOLIC BLOOD PRESSURE: 67 MMHG | TEMPERATURE: 98.6 F

## 2020-03-05 VITALS
TEMPERATURE: 97.4 F | HEART RATE: 68 BPM | BODY MASS INDEX: 35.52 KG/M2 | OXYGEN SATURATION: 96 % | SYSTOLIC BLOOD PRESSURE: 157 MMHG | HEIGHT: 66 IN | RESPIRATION RATE: 14 BRPM | DIASTOLIC BLOOD PRESSURE: 90 MMHG | WEIGHT: 221 LBS

## 2020-03-05 PROCEDURE — 3600000002 HC SURGERY LEVEL 2 BASE: Performed by: SURGERY

## 2020-03-05 PROCEDURE — 19285 PERQ DEV BREAST 1ST US IMAG: CPT

## 2020-03-05 PROCEDURE — 77065 DX MAMMO INCL CAD UNI: CPT

## 2020-03-05 PROCEDURE — 2709999900 HC NON-CHARGEABLE SUPPLY: Performed by: SURGERY

## 2020-03-05 PROCEDURE — 3600000012 HC SURGERY LEVEL 2 ADDTL 15MIN: Performed by: SURGERY

## 2020-03-05 PROCEDURE — 7100000001 HC PACU RECOVERY - ADDTL 15 MIN: Performed by: SURGERY

## 2020-03-05 PROCEDURE — 19125 EXCISION BREAST LESION: CPT | Performed by: SURGERY

## 2020-03-05 PROCEDURE — 88307 TISSUE EXAM BY PATHOLOGIST: CPT

## 2020-03-05 PROCEDURE — 3700000001 HC ADD 15 MINUTES (ANESTHESIA): Performed by: SURGERY

## 2020-03-05 PROCEDURE — 7100000010 HC PHASE II RECOVERY - FIRST 15 MIN: Performed by: SURGERY

## 2020-03-05 PROCEDURE — 2580000003 HC RX 258: Performed by: SURGERY

## 2020-03-05 PROCEDURE — 7100000011 HC PHASE II RECOVERY - ADDTL 15 MIN: Performed by: SURGERY

## 2020-03-05 PROCEDURE — 76098 X-RAY EXAM SURGICAL SPECIMEN: CPT

## 2020-03-05 PROCEDURE — 6360000002 HC RX W HCPCS: Performed by: NURSE ANESTHETIST, CERTIFIED REGISTERED

## 2020-03-05 PROCEDURE — 7100000000 HC PACU RECOVERY - FIRST 15 MIN: Performed by: SURGERY

## 2020-03-05 PROCEDURE — 3700000000 HC ANESTHESIA ATTENDED CARE: Performed by: SURGERY

## 2020-03-05 RX ORDER — LIDOCAINE HYDROCHLORIDE 20 MG/ML
INJECTION, SOLUTION INTRAVENOUS PRN
Status: DISCONTINUED | OUTPATIENT
Start: 2020-03-05 | End: 2020-03-05 | Stop reason: SDUPTHER

## 2020-03-05 RX ORDER — SODIUM CHLORIDE 0.9 % (FLUSH) 0.9 %
10 SYRINGE (ML) INJECTION ONCE
Status: COMPLETED | OUTPATIENT
Start: 2020-03-05 | End: 2020-03-05

## 2020-03-05 RX ORDER — FENTANYL CITRATE 50 UG/ML
INJECTION, SOLUTION INTRAMUSCULAR; INTRAVENOUS PRN
Status: DISCONTINUED | OUTPATIENT
Start: 2020-03-05 | End: 2020-03-05 | Stop reason: SDUPTHER

## 2020-03-05 RX ORDER — DEXAMETHASONE SODIUM PHOSPHATE 4 MG/ML
INJECTION, SOLUTION INTRA-ARTICULAR; INTRALESIONAL; INTRAMUSCULAR; INTRAVENOUS; SOFT TISSUE PRN
Status: DISCONTINUED | OUTPATIENT
Start: 2020-03-05 | End: 2020-03-05 | Stop reason: SDUPTHER

## 2020-03-05 RX ORDER — HYDROCODONE BITARTRATE AND ACETAMINOPHEN 5; 325 MG/1; MG/1
1 TABLET ORAL EVERY 4 HOURS PRN
Qty: 20 TABLET | Refills: 0 | Status: SHIPPED | OUTPATIENT
Start: 2020-03-05 | End: 2020-03-10

## 2020-03-05 RX ORDER — PROPOFOL 10 MG/ML
INJECTION, EMULSION INTRAVENOUS PRN
Status: DISCONTINUED | OUTPATIENT
Start: 2020-03-05 | End: 2020-03-05 | Stop reason: SDUPTHER

## 2020-03-05 RX ORDER — FENTANYL CITRATE 50 UG/ML
25 INJECTION, SOLUTION INTRAMUSCULAR; INTRAVENOUS EVERY 5 MIN PRN
Status: DISCONTINUED | OUTPATIENT
Start: 2020-03-05 | End: 2020-03-05 | Stop reason: HOSPADM

## 2020-03-05 RX ORDER — ONDANSETRON 2 MG/ML
INJECTION INTRAMUSCULAR; INTRAVENOUS PRN
Status: DISCONTINUED | OUTPATIENT
Start: 2020-03-05 | End: 2020-03-05 | Stop reason: SDUPTHER

## 2020-03-05 RX ORDER — SODIUM CHLORIDE, SODIUM LACTATE, POTASSIUM CHLORIDE, CALCIUM CHLORIDE 600; 310; 30; 20 MG/100ML; MG/100ML; MG/100ML; MG/100ML
INJECTION, SOLUTION INTRAVENOUS CONTINUOUS
Status: DISCONTINUED | OUTPATIENT
Start: 2020-03-05 | End: 2020-03-05 | Stop reason: HOSPADM

## 2020-03-05 RX ORDER — ONDANSETRON 2 MG/ML
4 INJECTION INTRAMUSCULAR; INTRAVENOUS
Status: DISCONTINUED | OUTPATIENT
Start: 2020-03-05 | End: 2020-03-05 | Stop reason: HOSPADM

## 2020-03-05 RX ADMIN — PROPOFOL 130 MG: 10 INJECTION, EMULSION INTRAVENOUS at 09:04

## 2020-03-05 RX ADMIN — ONDANSETRON HYDROCHLORIDE 4 MG: 4 INJECTION, SOLUTION INTRAMUSCULAR; INTRAVENOUS at 09:28

## 2020-03-05 RX ADMIN — LIDOCAINE HYDROCHLORIDE 100 MG: 20 INJECTION, SOLUTION INTRAVENOUS at 09:04

## 2020-03-05 RX ADMIN — DEXAMETHASONE SODIUM PHOSPHATE 4 MG: 4 INJECTION, SOLUTION INTRAMUSCULAR; INTRAVENOUS at 09:10

## 2020-03-05 RX ADMIN — SODIUM CHLORIDE, POTASSIUM CHLORIDE, SODIUM LACTATE AND CALCIUM CHLORIDE: 600; 310; 30; 20 INJECTION, SOLUTION INTRAVENOUS at 07:59

## 2020-03-05 RX ADMIN — Medication 10 ML: at 07:59

## 2020-03-05 RX ADMIN — FENTANYL CITRATE 100 MCG: 50 INJECTION INTRAMUSCULAR; INTRAVENOUS at 09:03

## 2020-03-05 ASSESSMENT — PULMONARY FUNCTION TESTS
PIF_VALUE: 15
PIF_VALUE: 15
PIF_VALUE: 2
PIF_VALUE: 16
PIF_VALUE: 18
PIF_VALUE: 15
PIF_VALUE: 16
PIF_VALUE: 0
PIF_VALUE: 17
PIF_VALUE: 17
PIF_VALUE: 0
PIF_VALUE: 16
PIF_VALUE: 17
PIF_VALUE: 0
PIF_VALUE: 18
PIF_VALUE: 20
PIF_VALUE: 0
PIF_VALUE: 15
PIF_VALUE: 0
PIF_VALUE: 18
PIF_VALUE: 18
PIF_VALUE: 2
PIF_VALUE: 17
PIF_VALUE: 16
PIF_VALUE: 17
PIF_VALUE: 1
PIF_VALUE: 18
PIF_VALUE: 17
PIF_VALUE: 18
PIF_VALUE: 1
PIF_VALUE: 16
PIF_VALUE: 17
PIF_VALUE: 18
PIF_VALUE: 0

## 2020-03-05 ASSESSMENT — PAIN DESCRIPTION - PAIN TYPE: TYPE: SURGICAL PAIN

## 2020-03-05 ASSESSMENT — PAIN DESCRIPTION - LOCATION: LOCATION: BREAST

## 2020-03-05 ASSESSMENT — PAIN SCALES - GENERAL
PAINLEVEL_OUTOF10: 2
PAINLEVEL_OUTOF10: 0
PAINLEVEL_OUTOF10: 0

## 2020-03-05 ASSESSMENT — PAIN DESCRIPTION - ORIENTATION: ORIENTATION: RIGHT

## 2020-03-05 ASSESSMENT — PAIN DESCRIPTION - DESCRIPTORS: DESCRIPTORS: ACHING

## 2020-03-05 ASSESSMENT — PAIN - FUNCTIONAL ASSESSMENT: PAIN_FUNCTIONAL_ASSESSMENT: 0-10

## 2020-03-05 NOTE — ADDENDUM NOTE
Addendum  created 03/05/20 1042 by BREE Carrasquillo - CRNA    Intraprocedure LDAs edited, LDA properties accepted

## 2020-03-05 NOTE — ANESTHESIA PRE PROCEDURE
Department of Anesthesiology  Preprocedure Note       Name:  Sally Baez   Age:  69 y.o.  :  1950                                          MRN:  [id number]         Date:  3/5/2020      Surgeon: Nelli Weber):  Luana Mackay MD    Procedure: BREAST LESION BIOPSY EXCISION NEEDLE LOCALIZATION MASS RIGHT (Right )    Medications prior to admission:   Prior to Admission medications    Medication Sig Start Date End Date Taking? Authorizing Provider   umeclidinium-vilanterol [facility] ELLIPTA) 62.5-25 MCG/INH AEPB inhaler 1 puff daily 20   Josey Molina MD   atorvastatin (LIPITOR) 40 MG tablet Take 1 tablet by mouth daily 20   Jennifer Olivas MD       Current medications:    No current facility-administered medications for this visit. No current outpatient medications on file. Facility-Administered Medications Ordered in Other Visits   Medication Dose Route Frequency Provider Last Rate Last Dose    lactated ringers infusion   Intravenous Continuous Luana Mackay  mL/hr at 20 0759         Allergies: Allergies   Allergen Reactions    Minocycline     Tetracyclines & Related     Demeclocycline Rash    Tetracycline Rash       Problem List:    Patient Active Problem List   Diagnosis Code    Mixed hyperlipidemia E78.2    Sarcoidosis D86.9    Depression F32.9    Hearing loss H91.90    Brain neoplasm ([address] D49.6    Liver dysfunction K76.89    Cyst of left kidney N28.1    Cyst of pancreas K86.2    Chronic obstructive pulmonary disease (HCC) J44.9    Oligoastrocytoma, WHO grade 2 ([address] C71.9    Abnormal mammogram R92.8       Past Medical History:        Diagnosis Date    Abnormal mammogram 2017    0.4 cm hypoechoic mass(most likely a small intramammary lymph node or less likely a complicated cyst.  Follow-up in 6 months recommended with ultrasound    Attempted suicide ([address] 2012    hanged herself.     Brain neoplasm ([address]     grade II Oligo resected , Dr. Yolanda Bravo resected tumor. Dr. Steven Govea oncologist seeing pt also. No chemo. Dr. Yolanda Bravo following pt q year. Tumor recurred, right frontal craniotomy and tumor resection  Dr. Andre Carbjaal. Had radiation to the brain     [facility] obstructive pulmonary disease ([facility] 6/15/2016    PFT showed mod sees Dr Misbah Macias RW    Cyst of left kidney 2014    MR I of . follow-up in 6-12 months    Cyst of pancreas 2014    Needs follow-up in one year 7/15    Depression     Hearing loss     Hearing loss     Herniated disc     L4 &L5    Hyperlipidemia     Kidney stones     Liver dysfunction 2014    In  hepatitis ABC negative. Immune studies negative, iron studies normal.  MRI of liver mild fatty liver. F/u LFTS normal.    Osteopenia 14    Sarcoidosis     Skin cancer     nose       Past Surgical History:        Procedure Laterality Date    CHOLECYSTECTOMY      COLONOSCOPY      f/u 5 years- Dr Gregorio Mazariegos      resection of brain tumor Dr. Nagi Alex LITHOTRIPSY     Kandi Mendoza      Dr Jones Bread History:    Social History     Tobacco Use    Smoking status: Former Smoker     Packs/day: 0.75     Years: 20.00     Pack years: 15.00     Last attempt to quit: 2003     Years since quittin.5    Smokeless tobacco: Never Used   Substance Use Topics    Alcohol use: No     Alcohol/week: 0.0 standard drinks                                Counseling given: Not Answered      Vital Signs (Current): There were no vitals filed for this visit.                                            BP Readings from Last 3 Encounters:   20 (!) 158/84   20 (!) 142/70   20 138/88       NPO Status:                                                                                 BMI:   Wt Readings from Last 3 Encounters:   20 221 lb (100.2 kg)   20 222 lb 9.6 oz (101 kg) Anesthesia Plan      general     ASA 3     (Chart review)  Induction: intravenous. MIPS: Postoperative opioids intended and Prophylactic antiemetics administered. Anesthetic plan and risks discussed with patient. Plan discussed with CRNA and attending.

## 2020-03-05 NOTE — OP NOTE
Right Breast Lumpectomy     Indications: This patient presents with history of a right breast mass with atypia. Given the clinical history and physical exam, along with indicated diagnostic studies, breast biopsy will be performed. Pre-operative Diagnosis: right breast mass    Post-operative Diagnosis: right breast mass    Surgeon: Arik Yarbrough     First Assistant: BREE Torres-CNP  The use of a first assistant was necessary for the proper positioning, prepping, and draping of the patient, intraoperative retraction and suctioning for visualization, passing sutures and implants, stapling bowel and vessels using devises when necessary. Anesthesia: General LMA anesthesia    ASA Class: 3    Procedure Details   The patient was seen in the Holding Room. The risks, benefits, complications, treatment options, and expected outcomes were discussed with the patient. The possibilities of reaction to medication, pulmonary aspiration, bleeding, infection, the need for additional procedures, failure to diagnose a condition, and creating a complication requiring transfusion or operation were discussed with the patient. The patient concurred with the proposed plan, giving informed consent. The site of surgery properly noted/marked. The patient was taken to Operating Room, identified as Blanco Combs, and the procedure verified as lumpectomy. A Time Out was held and the above information confirmed. After induction of anesthesia, the right  breast and chest were prepped and draped in standard fashion. The lumpectomy was performed by creating an oblique incision over the 4-5  o'clock of the breast.  The incision was deepened into the breast tissue and the tissue around the needle was removed. Bleeding was controlled with cautery. Mammography confirmed the clip in the specimen. The incision was closed with 3-0 and 5-0 vicryl. Skin glue was applied.        At the end of the operation, all sponge,

## 2020-03-05 NOTE — H&P
PATIENT NAME: Marc Chau   YOB: 1950    ADMISSION DATE: 3/5/2020. TODAY'S DATE: 3/5/2020    CHIEF COMPLAINT:  Breast mass    HISTORY OF PRESENT ILLNESS:  The patient is a 69 y.o. female  who presents with right breast mass. Onset of the symptoms was 2  weeks ago. Patient sought evaluation because of an abnormal mammogram.  Contributing factors include sister with breast CA. Previous evaluation has included breast biopsy (proliferative lesion with atypia) and mammogram (mass). Past Medical History:        Diagnosis Date    Abnormal mammogram 8/14/2017    0.4 cm hypoechoic mass(most likely a small intramammary lymph node or less likely a complicated cyst.  Follow-up in 6 months recommended with ultrasound    Attempted suicide ([address] 2012    hanged herself. 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 Elías Diaz resected tumor. Dr. Richard Comp oncologist seeing pt also. No chemo. Dr. Elías Diaz following pt q year. Tumor recurred, right frontal craniotomy and tumor resection 11/14 Dr. Demetria Ugarte. Had radiation to the brain     Chronic obstructive pulmonary disease ([address] 6/15/2016    PFT showed mod sees Dr Leonora Mendoza RW    Cyst of left kidney 9/11/2014    MR I of 7/14. follow-up in 6-12 months    Cyst of pancreas 9/11/2014    Needs follow-up in one year 7/15    Depression     Hearing loss     Hearing loss     Herniated disc     L4 &L5    Hyperlipidemia     Kidney stones     Liver dysfunction 6/6/2014    In 7/14 hepatitis ABC negative. Immune studies negative, iron studies normal.  MRI of liver mild fatty liver.  F/u LFTS normal.    Osteopenia 9/26/14    Sarcoidosis     Skin cancer     nose       Past Surgical History:        Procedure Laterality Date    CHOLECYSTECTOMY  2006    COLONOSCOPY  2008    f/u 5 years- Dr Jv Johnson  4/13    resection of brain tumor Dr. Rivas Krishnan LITHOTRIPSY  2005    Marko Chowdhury  2011    Dr Dayana Duvall Mother     Cancer Father         leukemia       REVIEW OF SYSTEMS:    Pertinent items noted in HPI    PHYSICAL EXAM:    VITALS:  BP (!) 158/84   Pulse 95   Temp 97.9 °F (36.6 °C) (Temporal)   Resp 16   Ht 5' 6\" (1.676 m)   Wt 221 lb (100.2 kg)   SpO2 95%   BMI 35.67 kg/m²   CONSTITUTIONAL:  awake, alert, no apparent distress  ENT:  normocepalic, without obvious abnormality  NECK:  supple, symmetrical, trachea midline  LUNGS:  clear to auscultation  CARDIOVASCULAR:  regular rate and rhythm  GASTROINTESTINAL;  normal bowel sounds, soft, non-distended  BREAST:  No masses or tenderness noted. No nipple discharge, bleeding, retraction or dimpling   MUSCULOSKELETAL:  0+ pitting edema lower extremities  NEUROLOGIC:  Mental Status Exam:  Level of Alertness:   awake  Orientation:   person, place, time    DATA:  CBC with Differential:    Lab Results   Component Value Date    WBC 7.4 10/09/2019    RBC 5.21 10/09/2019    HGB 14.6 10/09/2019    HCT 44.0 10/09/2019     10/09/2019    MCV 84.3 10/09/2019    MCH 27.9 10/09/2019    MCHC 33.1 10/09/2019    RDW 14.6 10/09/2019    SEGSPCT 71.8 04/11/2014    LYMPHOPCT 15.9 10/09/2019    MONOPCT 6.7 10/09/2019    BASOPCT 0.6 10/09/2019    MONOSABS 0.5 10/09/2019    LYMPHSABS 1.2 10/09/2019    EOSABS 0.1 10/09/2019    BASOSABS 0.0 10/09/2019    DIFFTYPE AUTOMATED DIFFERENTIAL 04/11/2014     CMP:    Lab Results   Component Value Date     10/09/2019    K 4.6 10/09/2019    CL 98 10/09/2019    CO2 22 10/09/2019    BUN 13 10/09/2019    CREATININE 1.0 01/13/2020    GFRAA >60 01/13/2020    AGRATIO 2.1 10/09/2019    LABGLOM 55 01/13/2020    GLUCOSE 128 10/09/2019    PROT 7.7 02/07/2020    PROT 7.8 10/14/2012    LABALBU 4.4 02/07/2020    CALCIUM 9.8 10/09/2019    BILITOT 0.4 02/07/2020    ALKPHOS 182 02/07/2020    AST 43 02/07/2020    ALT 65 02/07/2020       Radiology Reviewed    ASSESSMENT AND PLAN: Right breast mass - will plan excision with needle localization.   The risks, benefits and alternatives to the planned procedure were discussed. Patient expressed an understanding and is willing to proceed.     Staci Leonard, APRN-CNP

## 2020-03-05 NOTE — ANESTHESIA POSTPROCEDURE EVALUATION
Department of Anesthesiology  Postprocedure Note    Patient: Celsa Mcgregor  MRN: [id number]  YOB: 1950  Date of evaluation: 3/5/2020  Time:  9:42 AM     Procedure Summary     Date:  03/05/20 Room / Location:  [address] [place] [address] 01 / [facility]    Anesthesia Start:  4096 Anesthesia Stop:  9079    Procedure:  BREAST LESION BIOPSY EXCISION NEEDLE LOCALIZATION MASS RIGHT (Right ) Diagnosis:  (BREAST MASS RIGHT)    Surgeon:  Aminta Godoy MD Responsible Provider:  BREE Gtz CRNA    Anesthesia Type:  general ASA Status:  3          Anesthesia Type: general    Jeniffer Phase I: Jeniffer Score: 10    Jeniffer Phase II:      Last vitals: Reviewed and per EMR flowsheets.        Anesthesia Post Evaluation    Patient location during evaluation: bedside  Patient participation: complete - patient participated  Level of consciousness: awake and alert  Pain score: 0  Airway patency: patent  Nausea & Vomiting: no vomiting and no nausea  Complications: no  Cardiovascular status: blood pressure returned to baseline and hemodynamically stable  Respiratory status: acceptable, room air, spontaneous ventilation and nasal cannula  Hydration status: stable

## 2020-03-17 ENCOUNTER — OFFICE VISIT (OUTPATIENT)
Dept: SURGERY | Age: 70
End: 2020-03-17

## 2020-03-17 VITALS
HEART RATE: 118 BPM | TEMPERATURE: 97.8 F | RESPIRATION RATE: 26 BRPM | DIASTOLIC BLOOD PRESSURE: 82 MMHG | SYSTOLIC BLOOD PRESSURE: 130 MMHG

## 2020-03-17 PROCEDURE — 99024 POSTOP FOLLOW-UP VISIT: CPT | Performed by: SURGERY

## 2020-04-08 ENCOUNTER — TELEPHONE (OUTPATIENT)
Dept: INTERNAL MEDICINE CLINIC | Age: 70
End: 2020-04-08

## 2020-04-08 NOTE — TELEPHONE ENCOUNTER
Spoke with pt about doing virtual awv, pt declines at this time d/t not having access to do virtual visit.

## 2020-04-21 ENCOUNTER — APPOINTMENT (OUTPATIENT)
Dept: CT IMAGING | Age: 70
End: 2020-04-21
Payer: MEDICARE

## 2020-04-21 ENCOUNTER — OFFICE VISIT (OUTPATIENT)
Dept: INTERNAL MEDICINE CLINIC | Age: 70
End: 2020-04-21
Payer: MEDICARE

## 2020-04-21 ENCOUNTER — APPOINTMENT (OUTPATIENT)
Dept: MRI IMAGING | Age: 70
End: 2020-04-21
Payer: MEDICARE

## 2020-04-21 ENCOUNTER — HOSPITAL ENCOUNTER (EMERGENCY)
Age: 70
Discharge: HOME | End: 2020-04-21
Attending: EMERGENCY MEDICINE
Payer: MEDICARE

## 2020-04-21 VITALS
HEIGHT: 66 IN | RESPIRATION RATE: 16 BRPM | DIASTOLIC BLOOD PRESSURE: 86 MMHG | HEART RATE: 78 BPM | SYSTOLIC BLOOD PRESSURE: 147 MMHG | OXYGEN SATURATION: 98 % | BODY MASS INDEX: 33.91 KG/M2 | WEIGHT: 211 LBS | TEMPERATURE: 97.1 F

## 2020-04-21 VITALS
SYSTOLIC BLOOD PRESSURE: 138 MMHG | TEMPERATURE: 98 F | OXYGEN SATURATION: 97 % | HEART RATE: 104 BPM | DIASTOLIC BLOOD PRESSURE: 80 MMHG | WEIGHT: 211.6 LBS | BODY MASS INDEX: 34.15 KG/M2

## 2020-04-21 LAB
ALBUMIN SERPL-MCNC: 4 GM/DL (ref 3.4–5)
ALP BLD-CCNC: 150 IU/L (ref 40–129)
ALT SERPL-CCNC: 57 U/L (ref 10–40)
ANION GAP SERPL CALCULATED.3IONS-SCNC: 15 MMOL/L (ref 4–16)
AST SERPL-CCNC: 49 IU/L (ref 15–37)
BACTERIA: ABNORMAL /HPF
BASOPHILS ABSOLUTE: 0 K/CU MM
BASOPHILS RELATIVE PERCENT: 0.4 % (ref 0–1)
BETA-HYDROXYBUTYRATE: 9.6 MG/DL (ref 0–3)
BILIRUB SERPL-MCNC: 0.6 MG/DL (ref 0–1)
BILIRUBIN URINE: NEGATIVE MG/DL
BILIRUBIN, POC: NEGATIVE
BLOOD URINE, POC: ABNORMAL
BLOOD, URINE: ABNORMAL
BUN BLDV-MCNC: 17 MG/DL (ref 6–23)
CALCIUM SERPL-MCNC: 9.3 MG/DL (ref 8.3–10.6)
CAST TYPE: NEGATIVE /HPF
CHLORIDE BLD-SCNC: 96 MMOL/L (ref 99–110)
CHP ED QC CHECK: ABNORMAL
CLARITY, POC: ABNORMAL
CLARITY: ABNORMAL
CO2: 22 MMOL/L (ref 21–32)
COLOR, POC: YELLOW
COLOR: YELLOW
CREAT SERPL-MCNC: 0.9 MG/DL (ref 0.6–1.1)
CRYSTAL TYPE: NEGATIVE /HPF
DIFFERENTIAL TYPE: ABNORMAL
EOSINOPHILS ABSOLUTE: 0.1 K/CU MM
EOSINOPHILS RELATIVE PERCENT: 1 % (ref 0–3)
EPITHELIAL CELLS, UA: ABNORMAL /HPF
GFR AFRICAN AMERICAN: >60 ML/MIN/1.73M2
GFR NON-AFRICAN AMERICAN: >60 ML/MIN/1.73M2
GLUCOSE BLD-MCNC: 291 MG/DL (ref 70–99)
GLUCOSE BLD-MCNC: 458 MG/DL (ref 70–99)
GLUCOSE BLD-MCNC: 500 MG/DL
GLUCOSE BLD-MCNC: ABNORMAL MG/DL (ref 70–99)
GLUCOSE URINE, POC: ABNORMAL
GLUCOSE, URINE: >1000 MG/DL
HBA1C MFR BLD: 12.3 %
HCT VFR BLD CALC: 44.4 % (ref 37–47)
HEMOGLOBIN: 14.3 GM/DL (ref 12.5–16)
IMMATURE NEUTROPHIL %: 0.7 % (ref 0–0.43)
KETONES, POC: ABNORMAL
KETONES, URINE: 15 MG/DL
LEUKOCYTE EST, POC: NEGATIVE
LEUKOCYTE ESTERASE, URINE: NEGATIVE
LYMPHOCYTES ABSOLUTE: 1.2 K/CU MM
LYMPHOCYTES RELATIVE PERCENT: 14.4 % (ref 24–44)
MCH RBC QN AUTO: 27.8 PG (ref 27–31)
MCHC RBC AUTO-ENTMCNC: 32.2 % (ref 32–36)
MCV RBC AUTO: 86.2 FL (ref 78–100)
MONOCYTES ABSOLUTE: 0.5 K/CU MM
MONOCYTES RELATIVE PERCENT: 6.7 % (ref 0–4)
NITRITE URINE, QUANTITATIVE: NEGATIVE
NITRITE, POC: NEGATIVE
PDW BLD-RTO: 12.9 % (ref 11.7–14.9)
PH, POC: 5
PH, URINE: 5.5 (ref 5–8)
PLATELET # BLD: 275 K/CU MM (ref 140–440)
PMV BLD AUTO: 11.1 FL (ref 7.5–11.1)
POTASSIUM SERPL-SCNC: 4.5 MMOL/L (ref 3.5–5.1)
PROTEIN UA: NEGATIVE MG/DL
PROTEIN, POC: NEGATIVE
RBC # BLD: 5.15 M/CU MM (ref 4.2–5.4)
RBC URINE: ABNORMAL /HPF (ref 0–6)
SEGMENTED NEUTROPHILS ABSOLUTE COUNT: 6.2 K/CU MM
SEGMENTED NEUTROPHILS RELATIVE PERCENT: 76.8 % (ref 36–66)
SODIUM BLD-SCNC: 133 MMOL/L (ref 135–145)
SPECIFIC GRAVITY UA: 1.02 (ref 1–1.03)
SPECIFIC GRAVITY, POC: 1.01
TOTAL IMMATURE NEUTOROPHIL: 0.06 K/CU MM
TOTAL PROTEIN: 6.8 GM/DL (ref 6.4–8.2)
TROPONIN T: <0.01 NG/ML
UROBILINOGEN, POC: 0.2
UROBILINOGEN, URINE: 0.2 MG/DL (ref 0.2–1)
WBC # BLD: 8 K/CU MM (ref 4–10.5)
WBC UA: NEGATIVE /HPF (ref 0–5)

## 2020-04-21 PROCEDURE — 0509F URINE INCON PLAN DOCD: CPT | Performed by: INTERNAL MEDICINE

## 2020-04-21 PROCEDURE — 84484 ASSAY OF TROPONIN QUANT: CPT

## 2020-04-21 PROCEDURE — G8417 CALC BMI ABV UP PARAM F/U: HCPCS | Performed by: INTERNAL MEDICINE

## 2020-04-21 PROCEDURE — 3017F COLORECTAL CA SCREEN DOC REV: CPT | Performed by: INTERNAL MEDICINE

## 2020-04-21 PROCEDURE — 81002 URINALYSIS NONAUTO W/O SCOPE: CPT | Performed by: INTERNAL MEDICINE

## 2020-04-21 PROCEDURE — G8399 PT W/DXA RESULTS DOCUMENT: HCPCS | Performed by: INTERNAL MEDICINE

## 2020-04-21 PROCEDURE — 2580000003 HC RX 258: Performed by: EMERGENCY MEDICINE

## 2020-04-21 PROCEDURE — 82962 GLUCOSE BLOOD TEST: CPT | Performed by: INTERNAL MEDICINE

## 2020-04-21 PROCEDURE — G8427 DOCREV CUR MEDS BY ELIG CLIN: HCPCS | Performed by: INTERNAL MEDICINE

## 2020-04-21 PROCEDURE — 1090F PRES/ABSN URINE INCON ASSESS: CPT | Performed by: INTERNAL MEDICINE

## 2020-04-21 PROCEDURE — 6360000004 HC RX CONTRAST MEDICATION: Performed by: EMERGENCY MEDICINE

## 2020-04-21 PROCEDURE — G8926 SPIRO NO PERF OR DOC: HCPCS | Performed by: INTERNAL MEDICINE

## 2020-04-21 PROCEDURE — 85025 COMPLETE CBC W/AUTO DIFF WBC: CPT

## 2020-04-21 PROCEDURE — 80053 COMPREHEN METABOLIC PANEL: CPT

## 2020-04-21 PROCEDURE — 93005 ELECTROCARDIOGRAM TRACING: CPT | Performed by: EMERGENCY MEDICINE

## 2020-04-21 PROCEDURE — 1123F ACP DISCUSS/DSCN MKR DOCD: CPT | Performed by: INTERNAL MEDICINE

## 2020-04-21 PROCEDURE — 82962 GLUCOSE BLOOD TEST: CPT

## 2020-04-21 PROCEDURE — 83036 HEMOGLOBIN GLYCOSYLATED A1C: CPT | Performed by: INTERNAL MEDICINE

## 2020-04-21 PROCEDURE — A9579 GAD-BASE MR CONTRAST NOS,1ML: HCPCS | Performed by: EMERGENCY MEDICINE

## 2020-04-21 PROCEDURE — 99214 OFFICE O/P EST MOD 30 MIN: CPT | Performed by: INTERNAL MEDICINE

## 2020-04-21 PROCEDURE — 2022F DILAT RTA XM EVC RTNOPTHY: CPT | Performed by: INTERNAL MEDICINE

## 2020-04-21 PROCEDURE — 3023F SPIROM DOC REV: CPT | Performed by: INTERNAL MEDICINE

## 2020-04-21 PROCEDURE — G9899 SCRN MAM PERF RSLTS DOC: HCPCS | Performed by: INTERNAL MEDICINE

## 2020-04-21 PROCEDURE — 3046F HEMOGLOBIN A1C LEVEL >9.0%: CPT | Performed by: INTERNAL MEDICINE

## 2020-04-21 PROCEDURE — 82010 KETONE BODYS QUAN: CPT

## 2020-04-21 PROCEDURE — 99285 EMERGENCY DEPT VISIT HI MDM: CPT

## 2020-04-21 PROCEDURE — 87086 URINE CULTURE/COLONY COUNT: CPT

## 2020-04-21 PROCEDURE — 1036F TOBACCO NON-USER: CPT | Performed by: INTERNAL MEDICINE

## 2020-04-21 PROCEDURE — 6370000000 HC RX 637 (ALT 250 FOR IP): Performed by: EMERGENCY MEDICINE

## 2020-04-21 PROCEDURE — 70553 MRI BRAIN STEM W/O & W/DYE: CPT

## 2020-04-21 PROCEDURE — 70450 CT HEAD/BRAIN W/O DYE: CPT

## 2020-04-21 PROCEDURE — 4040F PNEUMOC VAC/ADMIN/RCVD: CPT | Performed by: INTERNAL MEDICINE

## 2020-04-21 PROCEDURE — 81001 URINALYSIS AUTO W/SCOPE: CPT

## 2020-04-21 RX ORDER — LORATADINE 10 MG/1
10 TABLET ORAL DAILY PRN
COMMUNITY
End: 2022-02-16 | Stop reason: CLARIF

## 2020-04-21 RX ORDER — 0.9 % SODIUM CHLORIDE 0.9 %
2000 INTRAVENOUS SOLUTION INTRAVENOUS ONCE
Status: COMPLETED | OUTPATIENT
Start: 2020-04-21 | End: 2020-04-21

## 2020-04-21 RX ADMIN — GADOTERIDOL 20 ML: 279.3 INJECTION, SOLUTION INTRAVENOUS at 19:16

## 2020-04-21 RX ADMIN — SODIUM CHLORIDE 2000 ML: 9 INJECTION, SOLUTION INTRAVENOUS at 16:41

## 2020-04-21 RX ADMIN — METFORMIN HYDROCHLORIDE 500 MG: 500 TABLET ORAL at 18:14

## 2020-04-21 ASSESSMENT — ENCOUNTER SYMPTOMS
EYE DISCHARGE: 0
EYE ITCHING: 0
ABDOMINAL PAIN: 0
EYE PAIN: 0
EYE REDNESS: 0
SHORTNESS OF BREATH: 0
WHEEZING: 0
EYES NEGATIVE: 1
GASTROINTESTINAL NEGATIVE: 1
PHOTOPHOBIA: 0
ALLERGIC/IMMUNOLOGIC NEGATIVE: 1
COUGH: 0
RESPIRATORY NEGATIVE: 1

## 2020-04-21 NOTE — PROGRESS NOTES
Nghia Vergara  Patient's  is 1950  Seen in office on 2020      SUBJECTIVE:  Olu gonzales 69 y. o.year old female presents today   Chief Complaint   Patient presents with    Eye Problem     left eye \"floater in several colors, comes and goes\"     Urinary Tract Infection     cannot empty bladder, incont of urine at times    Vaginal Itching    Polydipsia     constant,, BG finger stick= HIGH   A1C=12.3     Patient came to the office to be seen because multiple complaints  Patient states for the last few days she is having floaters in the left eye and they get bigger off and on. Her vision is still good. Denies any pain in the eye  Increased frequency of urination, increased thirst and is drinking water constantly for the last 2 weeks  Complains of vaginal itching  Patient feels she is incontinent of urine. Denies any headaches  Denies any chest pain or shortness of breath  No cough or sputum production  No dizziness. No syncope or near syncope  No family history of diabetes. Taking medications regularly. No side effects noted. Review of Systems   Constitutional: Negative. HENT: Negative. Eyes: Negative. Respiratory: Negative for cough, shortness of breath and wheezing. Cardiovascular: Negative for chest pain, palpitations and leg swelling. Gastrointestinal: Negative. Negative for abdominal pain. Endocrine: Negative. Genitourinary: Positive for frequency and urgency. Musculoskeletal: Negative. Allergic/Immunologic: Negative. Neurological: Negative. Negative for dizziness, numbness and headaches. Hematological: Negative. Psychiatric/Behavioral: Negative.         OBJECTIVE: /80   Pulse 104   Temp 98 °F (36.7 °C)   Wt 211 lb 9.6 oz (96 kg)   SpO2 97%   BMI 34.15 kg/m²     Wt Readings from Last 3 Encounters:   20 211 lb 9.6 oz (96 kg)   20 221 lb (100.2 kg)   20 222 lb 9.6 oz (101 kg)      GENERAL: - Alert, oriented, pleasant, in no apparent distress. HEENT: - Conjunctiva pink, no scleral icterus. ENT clear. NECK: -Supple. No jugular venous distention noted. No masses felt,  CARDIOVASCULAR: - Normal S1 and S2    PULMONARY: - No respiratory distress. No wheezes or rales. ABDOMEN: - Soft and non-tender,no masses  ororganomegaly. EXTREMITIES: - No cyanosis, clubbing, or significant edema. SKIN: Skin is warm and dry. NEUROLOGICAL: - Cranial nerves II through XII are grossly intact. IMPRESSION:    Encounter Diagnoses   Name Primary?  Urinary incontinence, unspecified type Yes    Polydipsia     Floaters in visual field, left     Type 2 diabetes mellitus without complication, without long-term current use of insulin (HCC)     Uncontrolled type 2 diabetes mellitus with hyperglycemia ([address]     Brain neoplasm (HCC)     Chronic obstructive pulmonary disease, unspecified COPD type ([address]     Cyst of pancreas     Depression, unspecified depression type     Sarcoidosis        ASSESSMENT/PLAN:    UA  Hga1c  Glucose check    Patient is blood sugar and glucometer running high that means over 500  Hemoglobin A1c was done which is 12.3  Urinalysis shows 500 mg/dl of sugar  Patient has new onset diabetes mellitus with floaters on the left eye  Is not clear how high the blood sugar  Discussed with emergency room  Will refer patient to the emergency room for further work-up  We will see patient after release from the ER      Mediations reviewed with the patient. Continue current medications. Appropriate prescriptions are addressed. After visit summeryprovided. Follow up as directed sooner if needed. Questions answered and patient verbalizes understanding. Call for any problems, questions, or concerns.        Allergies   Allergen Reactions    Minocycline     Tetracyclines & Related     Demeclocycline Rash    Tetracycline Rash     Current Outpatient Medications   Medication Sig Dispense Refill    loratadine (CLARITIN) 10 MG tablet Take 10 mg by mouth daily      umeclidinium-vilanterol (ANORO ELLIPTA) 62.5-25 MCG/INH AEPB inhaler 1 puff daily 1 each 5    atorvastatin (LIPITOR) 40 MG tablet Take 1 tablet by mouth daily 90 tablet 1     No current facility-administered medications for this visit. Past Medical History:   Diagnosis Date    Abnormal mammogram 8/14/2017    0.4 cm hypoechoic mass(most likely a small intramammary lymph node or less likely a complicated cyst.  Follow-up in 6 months recommended with ultrasound    Attempted [facility] 2012    hanged herself.  Brain neoplasm ([facility]     grade II Oligo resected 4/13, Dr. Triny Huston resected tumor. Dr. Johana Singletary oncologist seeing pt also. No chemo. Dr. Triny Huston following pt q year. Tumor recurred, right frontal craniotomy and tumor resection 11/14 Dr. Faizan Sandoval. Had radiation to the brain     [facility] 6/15/2016    PFT showed mod sees Dr Lawyer Iniguez RW    Cyst of left kidney 9/11/2014    MR I of 7/14. follow-up in 6-12 months    Cyst of pancreas 9/11/2014    Needs follow-up in one year 7/15    Depression     Hearing loss     Hearing loss     Herniated disc     L4 &L5    Hyperlipidemia     Kidney stones     Liver dysfunction 6/6/2014    In 7/14 hepatitis ABC negative. Immune studies negative, iron studies normal.  MRI of liver mild fatty liver.  F/u LFTS normal.    Osteopenia 9/26/14    Sarcoidosis     Skin cancer     nose     Past Surgical History:   Procedure Laterality Date    BREAST BIOPSY Right 3/5/2020    BREAST LESION BIOPSY EXCISION NEEDLE LOCALIZATION MASS RIGHT performed by Roxane Lopez MD at [address] Right 03/05/2020     [facility]    COLONOSCOPY  2008    f/u 5 years- Dr Ramez Perkins  4/13    resection of brain tumor Dr. Carmita Franklin LITHOTRIPSY  2005   Horris Notice  2011    Dr Cobos Back  TONSILLECTOMY       Social History     Tobacco Use    Smoking status: Former Smoker     Packs/day: 0.75     Years: 20.00     Pack years: 15.00     Last attempt to quit: 2003     Years since quittin.6    Smokeless tobacco: Never Used   Substance Use Topics    Alcohol use: No     Alcohol/week: 0.0 standard drinks       LAB REVIEW:  CBC:   Lab Results   Component Value Date    WBC 7.4 10/09/2019    HGB 14.6 10/09/2019    HCT 44.0 10/09/2019     10/09/2019     Lipids:   Lab Results   Component Value Date    CHOL 170 10/13/2018    TRIG 148 10/13/2018    HDL 52 10/09/2019    LDLCALC 73 10/09/2019    LDLDIRECT 90 2019    TRIGLYCFAST 206 (H) 10/09/2019     Renal:   Lab Results   Component Value Date    BUN 13 10/09/2019    CREATININE 1.0 2020     10/09/2019    K 4.6 10/09/2019    ALT 65 2020    AST 43 2020    GLUCOSE 128 10/09/2019     PT/INR:   Lab Results   Component Value Date    INR 0.91 10/14/2012     A1C:   Lab Results   Component Value Date    LABA1C 5.6 2014           Malika Chandler MD, 2020 , 3:04 PM

## 2020-04-21 NOTE — ED PROVIDER NOTES
of club or organization: Not on file     Attends meetings of clubs or organizations: Not on file     Relationship status: Not on file    Intimate partner violence     Fear of current or ex partner: Not on file     Emotionally abused: Not on file     Physically abused: Not on file     Forced sexual activity: Not on file   Other Topics Concern    Not on file   Social History Narrative    Not on file     No current facility-administered medications for this encounter. Current Outpatient Medications   Medication Sig Dispense Refill    loratadine (CLARITIN) 10 MG tablet Take 10 mg by mouth daily as needed       metFORMIN (GLUCOPHAGE) 500 MG tablet Take 1 tablet by mouth 2 times daily (with meals) 60 tablet 0    umeclidinium-vilanterol (ANORO ELLIPTA) 62.5-25 MCG/INH AEPB inhaler 1 puff daily 1 each 5    atorvastatin (LIPITOR) 40 MG tablet Take 1 tablet by mouth daily 90 tablet 1     Allergies   Allergen Reactions    Minocycline     Tetracyclines & Related     Demeclocycline Rash    Tetracycline Rash         ROS:    Review of Systems   Constitutional: Positive for fatigue. HENT: Negative. Eyes: Positive for visual disturbance. Negative for photophobia, pain, discharge, redness and itching. Respiratory: Negative. Cardiovascular: Negative. Genitourinary: Positive for dysuria. All other systems reviewed and are negative. Nursing Notes Reviewed    Physical Exam:  ED Triage Vitals [04/21/20 1541]   Enc Vitals Group      BP (!) 160/90      Pulse 72      Resp 18      Temp 97.1 °F (36.2 °C)      Temp Source Oral      SpO2 100 %      Weight 211 lb (95.7 kg)      Height 5' 6\" (1.676 m)      Head Circumference       Peak Flow       Pain Score       Pain Loc       Pain Edu? Excl. in [address]? Physical Exam  Vitals signs and nursing note reviewed. Exam conducted with a chaperone present. Constitutional:       Appearance: She is well-developed. She is obese.    HENT:      Head: Normocephalic Judgment: Judgment normal.         I have reviewed and interpreted all of the currently available lab results from this visit (ifapplicable):  Results for orders placed or performed during the hospital encounter of 04/21/20   Culture, Urine   Result Value Ref Range    Specimen URINE CLEAN CATCH     Special Requests NONE     Total [place] Count >[id number] COL/ML     Culture PROBABLE CONTAMINANTS HEAVY GROWTH (A)     Culture SUGGEST REPEAT CLEAN CATCH SPECIMEN    CBC Auto Differential   Result Value Ref Range    WBC 8.0 4.0 - 10.5 K/CU MM    RBC 5.15 4.2 - 5.4 M/CU MM    Hemoglobin 14.3 12.5 - 16.0 GM/DL    Hematocrit 44.4 37 - 47 %    MCV 86.2 78 - 100 FL    MCH 27.8 27 - 31 PG    MCHC 32.2 32.0 - 36.0 %    RDW 12.9 11.7 - 14.9 %    Platelets 850 266 - 453 K/CU MM    MPV 11.1 7.5 - 11.1 FL    Differential Type AUTOMATED DIFFERENTIAL     Segs Relative 76.8 (H) 36 - 66 %    Lymphocytes % 14.4 (L) 24 - 44 %    Monocytes % 6.7 (H) 0 - 4 %    Eosinophils % 1.0 0 - 3 %    Basophils % 0.4 0 - 1 %    Segs Absolute 6.2 K/CU MM    Lymphocytes Absolute 1.2 K/CU MM    Monocytes Absolute 0.5 K/CU MM    Eosinophils Absolute 0.1 K/CU MM    Basophils Absolute 0.0 K/CU MM    Immature Neutrophil % 0.7 (H) 0 - 0.43 %    Total Immature Neutrophil 0.06 K/CU MM   Comprehensive Metabolic Panel w/ Reflex to MG   Result Value Ref Range    Sodium 133 (L) 135 - 145 MMOL/L    Potassium 4.5 3.5 - 5.1 MMOL/L    Chloride 96 (L) 99 - 110 mMol/L    CO2 22 21 - 32 MMOL/L    BUN 17 6 - 23 MG/DL    CREATININE 0.9 0.6 - 1.1 MG/DL    Glucose  70 - 99 MG/DL     GLUCOSE 452 CALLED AND RB TO ONEL RN ED [id number]    Calcium 9.3 8.3 - 10.6 MG/DL    Alb 4.0 3.4 - 5.0 GM/DL    Total Protein 6.8 6.4 - 8.2 GM/DL    Total Bilirubin 0.6 0.0 - 1.0 MG/DL    ALT 57 (H) 10 - 40 U/L    AST 49 (H) 15 - 37 IU/L    Alkaline Phosphatase 150 (H) 40 - 129 IU/L    GFR Non-African American >60 >60 mL/min/1.73m2    GFR African American >60 >60 mL/min/1.73m2    Anion Gap 15 4 - 16   Troponin   Result Value Ref Range    Troponin T <0.010 <0.01 NG/ML   Beta-Hydroxybutyrate   Result Value Ref Range    Beta-Hydroxybutyrate 9.6 (H) 0.0 - 3.0 MG/DL   Urinalysis, reflex to microscopic   Result Value Ref Range    Color, UA YELLOW YELLOW    Clarity, UA SL HAZY CLEAR    Glucose, Urine >1,000 (A) NEGATIVE MG/DL    Bilirubin Urine NEGATIVE NEGATIVE MG/DL    Ketones, Urine 15 (A) NEGATIVE MG/DL    Specific Gravity, UA 1.020 1.001 - 1.035    Blood, Urine TRACE NEGATIVE    pH, Urine 5.5 5.0 - 8.0    Protein, UA NEGATIVE NEGATIVE MG/DL    Urobilinogen, Urine 0.2 0.2 - 1.0 MG/DL    Nitrite Urine, Quantitative NEGATIVE NEGATIVE    Leukocyte Esterase, Urine NEGATIVE NEGATIVE    RBC, UA 2 TO 3 0 - 6 /HPF    WBC, UA NEGATIVE 0 - 5 /HPF    Epithelial Cells, UA 0 TO 1 /HPF    Cast Type NEGATIVE (A) NO CAST FORMS SEEN /HPF    Bacteria, UA FEW NEGATIVE /HPF    Crystal Type NEGATIVE NEGATIVE /HPF   POCT Glucose   Result Value Ref Range    POC Glucose 458 (H) 70 - 99 MG/DL   POCT Glucose   Result Value Ref Range    POC Glucose 291 (H) 70 - 99 MG/DL   EKG 12 Lead   Result Value Ref Range    Ventricular Rate 80 BPM    Atrial Rate 80 BPM    P-R Interval 172 ms    QRS Duration 82 ms    Q-T Interval 386 ms    QTc Calculation (Bazett) 445 ms    P Axis 53 degrees    R Axis -23 degrees    T Axis 23 degrees    Diagnosis       Normal sinus rhythm  Inferior infarct , age undetermined  Possible Anterior infarct , age undetermined  Abnormal ECG  No previous ECGs available  Confirmed by [facility] ([id number]) on 4/22/2020 8:57:30 AM        Radiographs (if obtained):  [] The following radiograph wasinterpreted by myself in the absence of a radiologist:   [] Radiologist's Report Reviewed:  MRI BRAIN W WO CONTRAST   Final Result   Postop changes in the right frontal lobe. Surrounding the surgical cavity,   there is a moderate amount of gliotic change. There is no residual or   recurrent tumor in this region.

## 2020-04-21 NOTE — ED NOTES
This nurse to resume care of patient at this time, received report from Mao Dai, [facility]. Patient just returned from MRI, resting comfortably in a high fowlers position watching television. IV fluids restarted. Patient has no concerns or complaints at this time. Patient A&O x4, speech clear and appropriate.       Madison Lane RN  04/21/20 2510

## 2020-04-22 ENCOUNTER — TELEPHONE (OUTPATIENT)
Dept: INTERNAL MEDICINE CLINIC | Age: 70
End: 2020-04-22

## 2020-04-22 ENCOUNTER — TELEPHONE (OUTPATIENT)
Dept: SURGERY | Age: 70
End: 2020-04-22

## 2020-04-22 LAB
EKG ATRIAL RATE: 80 BPM
EKG DIAGNOSIS: NORMAL
EKG P AXIS: 53 DEGREES
EKG P-R INTERVAL: 172 MS
EKG Q-T INTERVAL: 386 MS
EKG QRS DURATION: 82 MS
EKG QTC CALCULATION (BAZETT): 445 MS
EKG R AXIS: -23 DEGREES
EKG T AXIS: 23 DEGREES
EKG VENTRICULAR RATE: 80 BPM

## 2020-04-22 PROCEDURE — 93010 ELECTROCARDIOGRAM REPORT: CPT | Performed by: INTERNAL MEDICINE

## 2020-04-22 NOTE — ED PROVIDER NOTES
LOW K - Abnormal; Notable for the following components:    Sodium 133 (*)     Chloride 96 (*)     ALT 57 (*)     AST 49 (*)     Alkaline Phosphatase 150 (*)     All other components within normal limits   BETA-HYDROXYBUTYRATE - Abnormal; Notable for the following components:    Beta-Hydroxybutyrate 9.6 (*)     All other components within normal limits   URINALYSIS - Abnormal; Notable for the following components:    Glucose, Urine >1,000 (*)     Ketones, Urine 15 (*)     Cast Type NEGATIVE (*)     All other components within normal limits   POCT GLUCOSE - Abnormal; Notable for the following components:    POC Glucose 458 (*)     All other components within normal limits   CULTURE, URINE   TROPONIN       Medications   0.9 % sodium chloride bolus (2,000 mLs Intravenous New Bag 4/21/20 1641)   metFORMIN (GLUCOPHAGE) tablet 500 mg (500 mg Oral Given 4/21/20 1814)   gadoteridol (PROHANCE) injection 20 mL (20 mLs Intravenous Given 4/21/20 1916)       Vitals:    04/21/20 1541   BP: (!) 160/90   Pulse: 72   Resp: 18   Temp: 97.1 °F (36.2 °C)   TempSrc: Oral   SpO2: 100%   Weight: 211 lb (95.7 kg)   Height: 5' 6\" (1.676 m)     MRI BRAIN W WO CONTRAST   Final Result   Postop changes in the right frontal lobe. Surrounding the surgical cavity,   there is a moderate amount of gliotic change. There is no residual or   recurrent tumor in this region. There is no acute infarct      There is a tiny focus of ill-defined enhancement in the right posterior   thalamus. Small capillary telangiectasia is favored, however comparison with   old imaging would be helpful for proper comparison. 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 Ex vacuo dilatation of   the right lateral ventricle.   Focus of calcification with adjacent high   attenuation measuring up to 4 mm located within the white matter of the right   frontal lobe just superior to the right lateral ventricle. Is uncertain if   this is dystrophic calcification or potentially related to small hemorrhagic   lesion. Recommend further evaluation with MRI of the brain with contrast.             My typical dicussion, presentation, and considerations for this patients' chief complaint, diagnosis, differential diagnosis, medications, medication use,  medication safety and medication interactions have been explained and outlined to this patient for this patient encounter. I have stressed need for follow up and reexamination for this encounter and or return to the emergency department if any changes or any concern. FINAL IMPRESSION:  1. Hyperglycemia    2.  Visual impairment        New Prescriptions    METFORMIN (GLUCOPHAGE) 500 MG TABLET    Take 1 tablet by mouth 2 times daily (with meals)                 [address] Jero, DO  04/21/20 2006

## 2020-04-22 NOTE — TELEPHONE ENCOUNTER
appt made with Dr. Wade Flood for tomorrow at 3 pm in [facility]. Office note, ED note and radiology results faxed to Dr. Kenroy Fulton office . Patient notified of same.

## 2020-04-23 LAB
CULTURE: ABNORMAL
CULTURE: ABNORMAL
Lab: ABNORMAL
SPECIMEN: ABNORMAL
TOTAL COLONY COUNT: ABNORMAL

## 2020-04-27 ENCOUNTER — VIRTUAL VISIT (OUTPATIENT)
Dept: INTERNAL MEDICINE CLINIC | Age: 70
End: 2020-04-27
Payer: MEDICARE

## 2020-04-27 ENCOUNTER — TELEPHONE (OUTPATIENT)
Dept: INTERNAL MEDICINE CLINIC | Age: 70
End: 2020-04-27

## 2020-04-27 PROBLEM — H53.9 VISUAL DISTURBANCE: Status: ACTIVE | Noted: 2020-04-27

## 2020-04-27 PROBLEM — E11.9 DIABETES MELLITUS, NEW ONSET (HCC): Status: ACTIVE | Noted: 2020-04-27

## 2020-04-27 PROCEDURE — 4040F PNEUMOC VAC/ADMIN/RCVD: CPT | Performed by: INTERNAL MEDICINE

## 2020-04-27 PROCEDURE — 99214 OFFICE O/P EST MOD 30 MIN: CPT | Performed by: INTERNAL MEDICINE

## 2020-04-27 PROCEDURE — 1123F ACP DISCUSS/DSCN MKR DOCD: CPT | Performed by: INTERNAL MEDICINE

## 2020-04-27 PROCEDURE — 2022F DILAT RTA XM EVC RTNOPTHY: CPT | Performed by: INTERNAL MEDICINE

## 2020-04-27 PROCEDURE — G8399 PT W/DXA RESULTS DOCUMENT: HCPCS | Performed by: INTERNAL MEDICINE

## 2020-04-27 PROCEDURE — G9899 SCRN MAM PERF RSLTS DOC: HCPCS | Performed by: INTERNAL MEDICINE

## 2020-04-27 PROCEDURE — G8427 DOCREV CUR MEDS BY ELIG CLIN: HCPCS | Performed by: INTERNAL MEDICINE

## 2020-04-27 PROCEDURE — 3017F COLORECTAL CA SCREEN DOC REV: CPT | Performed by: INTERNAL MEDICINE

## 2020-04-27 PROCEDURE — 3046F HEMOGLOBIN A1C LEVEL >9.0%: CPT | Performed by: INTERNAL MEDICINE

## 2020-04-27 PROCEDURE — 1090F PRES/ABSN URINE INCON ASSESS: CPT | Performed by: INTERNAL MEDICINE

## 2020-04-27 NOTE — PROGRESS NOTES
this is dystrophic calcification or potentially related to small hemorrhagic   lesion.  Recommend further evaluation with MRI of the brain with contrast.               MRI of the brain with and without contrast on 2020  Impression   Postop changes in the right frontal lobe.  Surrounding the surgical cavity,   there is a moderate amount of gliotic change.  There is no residual or   recurrent tumor in this region.       There is no acute infarct       There is a tiny focus of ill-defined enhancement in the right posterior   thalamus.  Small capillary telangiectasia is favored, however comparison with   old imaging would be helpful for proper comparison.       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 Authorizing Provider   loratadine (CLARITIN) 10 MG tablet Take 10 mg by mouth daily as needed  Yes Historical Provider, MD   metFORMIN (GLUCOPHAGE) 500 MG tablet Take 1 tablet by mouth 2 times daily (with meals) Yes Radha Jones, DO   umeclidinium-vilanterol (ANORO ELLIPTA) 62.5-25 MCG/INH AEPB inhaler 1 puff daily Yes Gulshan Velasquez MD   atorvastatin (LIPITOR) 40 MG tablet Take 1 tablet by mouth daily Yes Shalini Olivares MD   UNABLE TO FIND Please dispense what ins will cover, one glucometer kit, DX=E11.9 testing once daily and prn.   Shalini Olivares MD   UNABLE TO FIND Please dispense what insurance will cover, blood glucose test strips, DX=E11.9, testing once daily and prn  Shalini Olivares MD   UNABLE TO FIND Please dispense what insurance will cover, lancets, Dx=E11.9 testing once daily and prn  Shalini Olivares MD       Social History     Tobacco Use    Smoking status: Former Smoker     Packs/day: 0.75     Years: 20.00     Pack years: 15.00     Last attempt to quit: 2003     Years since quittin.6    Smokeless tobacco: ATGYQ-17 public health emergency), evaluation of the following organ systems was limited: Vitals/Constitutional/EENT/Resp/CV/GI//MS/Neuro/Skin/Heme-Lymph-Imm. Pursuant to the emergency declaration under the [facility], [address] [place] [address] authority and the [employer] Act, this Virtual Visit was conducted with patient's (and/or legal guardian's) consent, to reduce the patient's risk of exposure to COVID-19 and provide necessary medical care. The patient (and/or legal guardian) has also been advised to contact this office for worsening conditions or problems, and seek emergency medical treatment and/or call 911 if deemed necessary. Patient identification was verified at the start of the visit: Yes    Total time spent on this encounter: Not billed by time    Services were provided through a video synchronous discussion virtually to substitute for in-person clinic visit. Patient and provider were located at their individual homes. --Candelario De Luna MD on 4/27/2020 at 12:43 PM    An electronic signature was used to authenticate this note.

## 2020-04-27 NOTE — TELEPHONE ENCOUNTER
Spoke with gabe at [facility] [place] [facility] radiology stating that the MRI completed earlier this month, the radiologist was wanting to do a comparison. Last MRI was June 2018 with [facility]. She will let radiologists know.

## 2020-04-27 NOTE — ASSESSMENT & PLAN NOTE
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 Jl Neves resected tumor. Dr. Toyin Casanova oncologist seeing pt also. No chemo. Tumor recurred, right frontal craniotomy and tumor resection 11/14 Dr. Ryan Ortiz. Had radiation to the brain  Dr. Nicolás Ramirez following pt q year. Pt states she had MRI brain in 7 or 8/2017: was told it was stable. F/u in one year  Seen Dr Yu Chen last week and she was told to be stable. Seen Dr Ryan Ortiz 1/2020 : stable. A new CT of the head and MRI of the brain was done in the ER. Slight changes in the thalamus will send the reports to neurosurgeon to get an opinion.

## 2020-04-27 NOTE — ASSESSMENT & PLAN NOTE
Blood sugar around 500. Hemoglobin A1c 12.3 on 4/21/2020  Started on metformin 500 mg twice a day  Patient blood sugars are coming down.   Patient to keep blood sugar log and bring back in 2 weeks  Follow diabetic diet

## 2020-05-12 ENCOUNTER — TELEPHONE (OUTPATIENT)
Dept: SURGERY | Age: 70
End: 2020-05-12

## 2020-05-12 NOTE — TELEPHONE ENCOUNTER
guidelines            [employer] exposure contact - [phone]            Contact for their local Department of Health                 Patient currently reports that the following symptoms have improved:  no worsening s/sx     No further outreach scheduled with this CTN/ACM. Episode of Care resolved. Patient has this CTN/ACM contact information if future needs arise.

## 2020-07-20 ENCOUNTER — OFFICE VISIT (OUTPATIENT)
Dept: INTERNAL MEDICINE CLINIC | Age: 70
End: 2020-07-20
Payer: MEDICARE

## 2020-07-20 VITALS
TEMPERATURE: 97.3 F | OXYGEN SATURATION: 98 % | DIASTOLIC BLOOD PRESSURE: 80 MMHG | HEART RATE: 96 BPM | BODY MASS INDEX: 30.83 KG/M2 | WEIGHT: 191 LBS | SYSTOLIC BLOOD PRESSURE: 110 MMHG

## 2020-07-20 LAB
A/G RATIO: 1.5 (ref 1.1–2.2)
ALBUMIN SERPL-MCNC: 4.4 G/DL (ref 3.4–5)
ALP BLD-CCNC: 135 U/L (ref 40–129)
ALT SERPL-CCNC: 23 U/L (ref 10–40)
ANION GAP SERPL CALCULATED.3IONS-SCNC: 16 MMOL/L (ref 3–16)
AST SERPL-CCNC: 16 U/L (ref 15–37)
BILIRUB SERPL-MCNC: <0.2 MG/DL (ref 0–1)
BUN BLDV-MCNC: 16 MG/DL (ref 7–20)
CALCIUM SERPL-MCNC: 10 MG/DL (ref 8.3–10.6)
CHLORIDE BLD-SCNC: 103 MMOL/L (ref 99–110)
CO2: 22 MMOL/L (ref 21–32)
CREAT SERPL-MCNC: 0.9 MG/DL (ref 0.6–1.2)
GFR AFRICAN AMERICAN: >60
GFR NON-AFRICAN AMERICAN: >60
GLOBULIN: 3 G/DL
GLUCOSE BLD-MCNC: 98 MG/DL (ref 70–99)
HBA1C MFR BLD: 6.3 %
POTASSIUM SERPL-SCNC: 4.7 MMOL/L (ref 3.5–5.1)
SODIUM BLD-SCNC: 141 MMOL/L (ref 136–145)
TOTAL PROTEIN: 7.4 G/DL (ref 6.4–8.2)

## 2020-07-20 PROCEDURE — 1036F TOBACCO NON-USER: CPT | Performed by: INTERNAL MEDICINE

## 2020-07-20 PROCEDURE — 2022F DILAT RTA XM EVC RTNOPTHY: CPT | Performed by: INTERNAL MEDICINE

## 2020-07-20 PROCEDURE — 3017F COLORECTAL CA SCREEN DOC REV: CPT | Performed by: INTERNAL MEDICINE

## 2020-07-20 PROCEDURE — 3044F HG A1C LEVEL LT 7.0%: CPT | Performed by: INTERNAL MEDICINE

## 2020-07-20 PROCEDURE — G8926 SPIRO NO PERF OR DOC: HCPCS | Performed by: INTERNAL MEDICINE

## 2020-07-20 PROCEDURE — 99214 OFFICE O/P EST MOD 30 MIN: CPT | Performed by: INTERNAL MEDICINE

## 2020-07-20 PROCEDURE — G8399 PT W/DXA RESULTS DOCUMENT: HCPCS | Performed by: INTERNAL MEDICINE

## 2020-07-20 PROCEDURE — 1123F ACP DISCUSS/DSCN MKR DOCD: CPT | Performed by: INTERNAL MEDICINE

## 2020-07-20 PROCEDURE — G8427 DOCREV CUR MEDS BY ELIG CLIN: HCPCS | Performed by: INTERNAL MEDICINE

## 2020-07-20 PROCEDURE — 83036 HEMOGLOBIN GLYCOSYLATED A1C: CPT | Performed by: INTERNAL MEDICINE

## 2020-07-20 PROCEDURE — G8417 CALC BMI ABV UP PARAM F/U: HCPCS | Performed by: INTERNAL MEDICINE

## 2020-07-20 PROCEDURE — G9899 SCRN MAM PERF RSLTS DOC: HCPCS | Performed by: INTERNAL MEDICINE

## 2020-07-20 PROCEDURE — 36415 COLL VENOUS BLD VENIPUNCTURE: CPT | Performed by: INTERNAL MEDICINE

## 2020-07-20 PROCEDURE — 1090F PRES/ABSN URINE INCON ASSESS: CPT | Performed by: INTERNAL MEDICINE

## 2020-07-20 PROCEDURE — 4040F PNEUMOC VAC/ADMIN/RCVD: CPT | Performed by: INTERNAL MEDICINE

## 2020-07-20 PROCEDURE — 3023F SPIROM DOC REV: CPT | Performed by: INTERNAL MEDICINE

## 2020-07-20 RX ORDER — ATORVASTATIN CALCIUM 40 MG/1
40 TABLET, FILM COATED ORAL DAILY
Qty: 90 TABLET | Refills: 1 | Status: SHIPPED | OUTPATIENT
Start: 2020-07-20 | End: 2020-10-27 | Stop reason: SDUPTHER

## 2020-07-20 ASSESSMENT — ENCOUNTER SYMPTOMS
WHEEZING: 0
SHORTNESS OF BREATH: 0
COUGH: 0
EYES NEGATIVE: 1
GASTROINTESTINAL NEGATIVE: 1
RESPIRATORY NEGATIVE: 1
ALLERGIC/IMMUNOLOGIC NEGATIVE: 1

## 2020-07-20 NOTE — ASSESSMENT & PLAN NOTE
Patient has sarcoidosis and sees pulmonologist.  She is taking the inhalers and is doing fairly well.

## 2020-07-20 NOTE — ASSESSMENT & PLAN NOTE
0.4 cm hypoechoic mass(most likely a small intramammary lymph node or less likely a complicated cyst.  Follow-up in 6 months recommended with ultrasound  11/2018. Mammogram showed multiple small cysts in the left breast.  Follow-up in 6 months  Patient has seen Dr. Kenroy Mejia. Had the biopsy of the breast that was not malignant.

## 2020-07-20 NOTE — PROGRESS NOTES
dry.   NEUROLOGICAL: - Cranial nerves II through XII are grossly intact. IMPRESSION:    Encounter Diagnoses   Name Primary?  Diabetes mellitus, new onset ([address] Yes    Bilateral hearing loss, unspecified hearing loss type     Liver dysfunction     Mixed hyperlipidemia     Oligoastrocytoma, WHO grade 2 (HCC)     Sarcoidosis     Visual disturbance     Abnormal mammogram     Chronic obstructive pulmonary disease, unspecified COPD type ([address]        ASSESSMENT/PLAN:    Diabetes mellitus, new onset ([address]  Blood sugar around 500. Hemoglobin A1c 12.3 on 4/21/2020  Started on metformin 500 mg twice a day  Hemoglobin A1c has decreased to 6.3  Patient blood sugars are running very well. No hypoglycemia continue the same. Hearing loss  Patient has bilateral hearing loss. Uses hearing aids    Liver dysfunction  History of liver dysfunction. Recheck CMP. Mixed hyperlipidemia  Patient is on atorvastatin. Will check liver function test again. Follow low-cholesterol diet    Oligoastrocytoma, WHO grade 2 (HCC)  Patient has oligo astrocytoma and sees Dr. Marisa Cerda    Sarcoidosis  Patient has sarcoidosis and sees pulmonologist.  She is taking the inhalers and is doing fairly well. Visual disturbance  Visual disturbances have resolved. Abnormal mammogram  0.4 cm hypoechoic mass(most likely a small intramammary lymph node or less likely a complicated cyst.  Follow-up in 6 months recommended with ultrasound  11/2018. Mammogram showed multiple small cysts in the left breast.  Follow-up in 6 months  Patient has seen Dr. Cat Cunningham. Had the biopsy of the breast that was not malignant. Chronic obstructive pulmonary disease (HCC)  PFT showed mod sees Dr Belen Ross RW  Used Anora and ventolin prn. Orders Placed This Encounter   Procedures    Comprehensive Metabolic Panel    POCT glycosylated hemoglobin (Hb A1C)         Mediations reviewed with the patient. Continue current medications.   Appropriate prescriptions are addressed. After visit summeryprovided. Follow up as directed sooner if needed. Questions answered and patient verbalizes understanding. Call for any problems, questions, or concerns. Allergies   Allergen Reactions    Minocycline     Tetracyclines & Related     Demeclocycline Rash    Tetracycline Rash     Current Outpatient Medications   Medication Sig Dispense Refill    metFORMIN (GLUCOPHAGE) 500 MG tablet Take 1 tablet by mouth 2 times daily (with meals) 60 tablet 1    loratadine (CLARITIN) 10 MG tablet Take 10 mg by mouth daily as needed       umeclidinium-vilanterol (ANORO ELLIPTA) 62.5-25 MCG/INH AEPB inhaler 1 puff daily 1 each 5    atorvastatin (LIPITOR) 40 MG tablet Take 1 tablet by mouth daily 90 tablet 1     No current facility-administered medications for this visit. Past Medical History:   Diagnosis Date    Abnormal mammogram 8/14/2017    0.4 cm hypoechoic mass(most likely a small intramammary lymph node or less likely a complicated cyst.  Follow-up in 6 months recommended with ultrasound    Attempted [address] [address] 2012    hanged herself.  Brain neoplasm ([facility] [address] [facility]     grade II Oligo resected 4/13, Dr. Stephanie Salas resected tumor. Dr. Talley Face oncologist seeing pt also. No chemo. Dr. Stephanie Salas following pt q year. Tumor recurred, right frontal craniotomy and tumor resection 11/14 Dr. Mando Hardy. Had radiation to the brain     Chronic obstructive pulmonary disease ([facility] [address] 6/15/2016    PFT showed mod sees Dr Jenni Quintero RW    Cyst of left kidney 9/11/2014    MR I of 7/14. follow-up in 6-12 months    Cyst of pancreas 9/11/2014    Needs follow-up in one year 7/15    Depression     Hearing loss     Hearing loss     Herniated disc     L4 &L5    Hyperlipidemia     Kidney stones     Liver dysfunction 6/6/2014    In 7/14 hepatitis ABC negative. Immune studies negative, iron studies normal.  MRI of liver mild fatty liver.  F/u LFTS normal.    Osteopenia 9/26/14    Sarcoidosis     Skin cancer nose     Past Surgical History:   Procedure Laterality Date    BREAST BIOPSY Right 3/5/2020    BREAST LESION BIOPSY EXCISION NEEDLE LOCALIZATION MASS RIGHT performed by Thi Deleon MD at [address] 2020     right breast mass removal by Dr. Ivett Pradhan in [place] [address]      COLONOSCOPY  2008    f/u 5 years- Dr Faiza Figueroa      resection of brain tumor Dr. Samantha Doshi LITHOTRIPSY      Mariola Books      Dr Pratibha Tripathi History     Tobacco Use    Smoking status: Former Smoker     Packs/day: 0.75     Years: 20.00     Pack years: 15.00     Last attempt to quit: 2003     Years since quittin.9    Smokeless tobacco: Never Used   Substance Use Topics    Alcohol use: No     Alcohol/week: 0.0 standard drinks       LAB REVIEW:  CBC:   Lab Results   Component Value Date    WBC 8.0 2020    HGB 14.3 2020    HCT 44.4 2020     2020     Lipids:   Lab Results   Component Value Date    CHOL 170 10/13/2018    TRIG 148 10/13/2018    HDL 52 10/09/2019    LDLCALC 73 10/09/2019    LDLDIRECT 90 2019    TRIGLYCFAST 206 (H) 10/09/2019     Renal:   Lab Results   Component Value Date    BUN 17 2020    CREATININE 0.9 2020     2020    K 4.5 2020    ALT 57 2020    AST 49 2020    GLUCOSE  2020     GLUCOSE 452 CALLED AND RB TO [facility] ED [id number]     PT/INR:   Lab Results   Component Value Date    INR 0.91 10/14/2012     A1C:   Lab Results   Component Value Date    LABA1C 12.3 2020           See Cavazos MD, 2020 , 1:29 PM

## 2020-07-20 NOTE — ASSESSMENT & PLAN NOTE
Blood sugar around 500. Hemoglobin A1c 12.3 on 4/21/2020  Started on metformin 500 mg twice a day  Hemoglobin A1c has decreased to 6.3  Patient blood sugars are running very well. No hypoglycemia continue the same.

## 2020-08-27 NOTE — TELEPHONE ENCOUNTER
Patient also needs test strips she is testing twice a day, also patient needs lancets.  RTO-10/27/2020      Interested in Diabetic classes, please advise

## 2020-09-23 RX ORDER — SYRING-NEEDL,DISP,INSUL,0.3 ML 30 GX5/16"
1 SYRINGE, EMPTY DISPOSABLE MISCELLANEOUS 2 TIMES DAILY
COMMUNITY

## 2020-09-23 RX ORDER — LANCETS 30 GAUGE
1 EACH MISCELLANEOUS 2 TIMES DAILY
COMMUNITY
End: 2020-09-23 | Stop reason: SDUPTHER

## 2020-09-23 RX ORDER — LANCETS 30 GAUGE
1 EACH MISCELLANEOUS 2 TIMES DAILY
Qty: 100 EACH | Refills: 5 | Status: SHIPPED | OUTPATIENT
Start: 2020-09-23

## 2020-10-06 RX ORDER — CALCIUM CITRATE/VITAMIN D3 200MG-6.25
TABLET ORAL
Qty: 50 STRIP | Refills: 5 | Status: SHIPPED | OUTPATIENT
Start: 2020-10-06

## 2020-10-27 RX ORDER — ATORVASTATIN CALCIUM 40 MG/1
40 TABLET, FILM COATED ORAL DAILY
Qty: 90 TABLET | Refills: 1 | Status: SHIPPED | OUTPATIENT
Start: 2020-10-27 | End: 2021-02-01 | Stop reason: SDUPTHER

## 2020-11-03 ENCOUNTER — OFFICE VISIT (OUTPATIENT)
Dept: INTERNAL MEDICINE CLINIC | Age: 70
End: 2020-11-03
Payer: MEDICARE

## 2020-11-03 VITALS
RESPIRATION RATE: 16 BRPM | TEMPERATURE: 97.7 F | WEIGHT: 198.6 LBS | BODY MASS INDEX: 32.05 KG/M2 | OXYGEN SATURATION: 98 % | DIASTOLIC BLOOD PRESSURE: 70 MMHG | HEART RATE: 62 BPM | SYSTOLIC BLOOD PRESSURE: 114 MMHG

## 2020-11-03 LAB
CREATININE URINE POCT: NORMAL
HBA1C MFR BLD: 5.6 %
MICROALBUMIN/CREAT 24H UR: NORMAL MG/G{CREAT}
MICROALBUMIN/CREAT UR-RTO: NORMAL

## 2020-11-03 PROCEDURE — 1123F ACP DISCUSS/DSCN MKR DOCD: CPT | Performed by: INTERNAL MEDICINE

## 2020-11-03 PROCEDURE — 4040F PNEUMOC VAC/ADMIN/RCVD: CPT | Performed by: INTERNAL MEDICINE

## 2020-11-03 PROCEDURE — 3044F HG A1C LEVEL LT 7.0%: CPT | Performed by: INTERNAL MEDICINE

## 2020-11-03 PROCEDURE — 99214 OFFICE O/P EST MOD 30 MIN: CPT | Performed by: INTERNAL MEDICINE

## 2020-11-03 PROCEDURE — G9899 SCRN MAM PERF RSLTS DOC: HCPCS | Performed by: INTERNAL MEDICINE

## 2020-11-03 PROCEDURE — G8427 DOCREV CUR MEDS BY ELIG CLIN: HCPCS | Performed by: INTERNAL MEDICINE

## 2020-11-03 PROCEDURE — G8399 PT W/DXA RESULTS DOCUMENT: HCPCS | Performed by: INTERNAL MEDICINE

## 2020-11-03 PROCEDURE — 1090F PRES/ABSN URINE INCON ASSESS: CPT | Performed by: INTERNAL MEDICINE

## 2020-11-03 PROCEDURE — 83036 HEMOGLOBIN GLYCOSYLATED A1C: CPT | Performed by: INTERNAL MEDICINE

## 2020-11-03 PROCEDURE — 3017F COLORECTAL CA SCREEN DOC REV: CPT | Performed by: INTERNAL MEDICINE

## 2020-11-03 PROCEDURE — 2022F DILAT RTA XM EVC RTNOPTHY: CPT | Performed by: INTERNAL MEDICINE

## 2020-11-03 PROCEDURE — 82044 UR ALBUMIN SEMIQUANTITATIVE: CPT | Performed by: INTERNAL MEDICINE

## 2020-11-03 PROCEDURE — G8482 FLU IMMUNIZE ORDER/ADMIN: HCPCS | Performed by: INTERNAL MEDICINE

## 2020-11-03 PROCEDURE — G8926 SPIRO NO PERF OR DOC: HCPCS | Performed by: INTERNAL MEDICINE

## 2020-11-03 PROCEDURE — 3023F SPIROM DOC REV: CPT | Performed by: INTERNAL MEDICINE

## 2020-11-03 PROCEDURE — 1036F TOBACCO NON-USER: CPT | Performed by: INTERNAL MEDICINE

## 2020-11-03 PROCEDURE — G8417 CALC BMI ABV UP PARAM F/U: HCPCS | Performed by: INTERNAL MEDICINE

## 2020-11-03 RX ORDER — METFORMIN HYDROCHLORIDE 500 MG/1
500 TABLET, EXTENDED RELEASE ORAL
Qty: 30 TABLET | Refills: 3 | Status: SHIPPED | OUTPATIENT
Start: 2020-11-03 | End: 2021-03-23 | Stop reason: SDUPTHER

## 2020-11-03 RX ORDER — ESCITALOPRAM OXALATE 10 MG/1
10 TABLET ORAL DAILY
Qty: 30 TABLET | Refills: 3 | Status: SHIPPED | OUTPATIENT
Start: 2020-11-03 | End: 2021-04-27 | Stop reason: SDUPTHER

## 2020-11-03 NOTE — PROGRESS NOTES
Julio Cox  Patient's  is 1950  Seen in office on 11/3/2020      SUBJECTIVE:  Joaquin Iglesias christian 69 y. o.year old female presents today   Chief Complaint   Patient presents with    Hypertension    Diabetes    Other     saw Dr. Roc Holder yesterday, was given flu vaccine at that visit     Patient is here for follow-up of hypertension, diabetes, hyperlipidemia sarcoidosis  Patient states she doing well. She is pulmonologist Dr. Denny Ferris and he gave her flu vaccine. He continued her on same medications. Patient has hypertension. Taking medications No headaches, no chest pain, no palpitations and no dizziness. Patient has DM. No hypoglycemia. No numbness or weakness. No dizziness. Blood sugars are good at home. Patient has hyperlipidemia. Taking medications. No abdominal pain, no nausea or vomiting. No myalgias. Patient denies any chest pain. No dizziness. No syncope or near syncope. Taking medications regularly. No side effects noted. Review of Systems  Review of system normal except as in HPI  OBJECTIVE: /70   Pulse 62   Temp 97.7 °F (36.5 °C) (Oral)   Resp 16   Wt 198 lb 9.6 oz (90.1 kg)   SpO2 98%   BMI 32.05 kg/m²     Wt Readings from Last 3 Encounters:   20 198 lb 9.6 oz (90.1 kg)   20 191 lb (86.6 kg)   20 195 lb 8 oz (88.7 kg)      Patient was seen taking COVID-19 precautions. Face mask, gloves and eyes protectors were used. Patient also wore facemask. GENERAL: - Alert, oriented, pleasant, in no apparent distress. HEENT: - Conjunctiva pink, no scleral icterus. ENT clear. NECK: -Supple. No jugular venous distention noted. No masses felt,  CARDIOVASCULAR: - Normal S1 and S2    PULMONARY: - No respiratory distress. No wheezes or rales. ABDOMEN: - Soft and non-tender,no masses  ororganomegaly. EXTREMITIES: - No cyanosis, clubbing, or significant edema. SKIN: Skin is warm and dry.    NEUROLOGICAL: - Cranial nerves II through XII are grossly intact. Feet exam :   Visual inspection:  Deformity/amputation: absent  Skin lesions/pre-ulcerative calluses: absent  Edema: right- negative, left- negative    Sensory exam:  Monofilament sensation: normal  (minimum of 5 random plantar locations tested, avoiding callused areas - > 1 area with absence of sensation is + for neuropathy)    Plus at least one of the following:  Pulses: normal,   Pinprick: Intact        IMPRESSION:    Encounter Diagnoses   Name Primary?  Diabetes mellitus, new onset ([facility] Yes    Mixed hyperlipidemia     Depression, unspecified depression type     Sarcoidosis     Oligoastrocytoma, WHO grade 2 (HCC)     Bilateral hearing loss, unspecified hearing loss type     Chronic obstructive pulmonary disease, unspecified COPD type (HCC)        ASSESSMENT/PLAN:    .  Diabetes mellitus. Patient's hemoglobin A1c is decreased to 5.6. Decrease metformin to once a day  . Hyperlipidemia. Continue current medication atorvastatin  . Sarcoidosis. Patient is taking inhalers and is seeing pulmonologist  .  Depression controlled not on any medications  . Hearing loss is stable patient using hearing aids  . Oligo astrocytoma. Patient sees neurosurgeon Dr. Zora Goodell. Follow-up in April 21  . Cyst in the left kidney is stable benign  . Cyst in the pancreas also looks normal  .  COPD stable    Orders Placed This Encounter   Procedures    Lipid, Fasting    Comprehensive Metabolic Panel    POCT microalbumin    POCT glycosylated hemoglobin (Hb A1C)     DIABETES FOOT EXAM           Mediations reviewed with the patient. Continue current medications. Appropriate prescriptions are addressed. After visit summeryprovided. Follow up as directed sooner if needed. Questions answered and patient verbalizes understanding. Call for any problems, questions, or concerns.        Allergies   Allergen Reactions    Minocycline     Tetracyclines & Related     Demeclocycline Rash    Tetracycline Rash Current Outpatient Medications   Medication Sig Dispense Refill    atorvastatin (LIPITOR) 40 MG tablet Take 1 tablet by mouth daily 90 tablet 1    metFORMIN (GLUCOPHAGE) 500 MG tablet Take 1 tablet by mouth 2 times daily (with meals) 60 tablet 1    blood glucose test strips (TRUE METRIX BLOOD GLUCOSE TEST) strip Testing once daily, DX=E11.9 50 strip 5    Lancet Device MISC 1 Device by Does not apply route 2 times daily DX: E11.9      Lancets Ultra Thin 13K MISC 1 applicator by Does not apply route 2 times daily 100 each 5    umeclidinium-vilanterol (ANORO ELLIPTA) 62.5-25 MCG/INH AEPB inhaler 1 puff daily 1 each 5    UNABLE TO FIND Please dispense what insurance will cover, blood glucose test strips, DX=E11.9, testing once daily and prn 60 strip 2    loratadine (CLARITIN) 10 MG tablet Take 10 mg by mouth daily as needed        No current facility-administered medications for this visit. Past Medical History:   Diagnosis Date    Abnormal mammogram 8/14/2017    0.4 cm hypoechoic mass(most likely a small intramammary lymph node or less likely a complicated cyst.  Follow-up in 6 months recommended with ultrasound    Attempted suicide ([address] 2012    hanged herself. 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 Jared Jaeger resected tumor. Dr. Mildred Rainey oncologist seeing pt also. No chemo. Dr. Jared Jaeger following pt q year. Tumor recurred, right frontal craniotomy and tumor resection 11/14 Dr. Juna Cooks. Had radiation to the brain     Chronic obstructive pulmonary disease ([address] 6/15/2016    PFT showed mod sees Dr Addis Thompson RW    Cyst of left kidney 9/11/2014    MR I of 7/14. follow-up in 6-12 months    Cyst of pancreas 9/11/2014    Needs follow-up in one year 7/15    Depression     Hearing loss     Hearing loss     Herniated disc     L4 &L5    Hyperlipidemia     Kidney stones     Liver dysfunction 6/6/2014    In 7/14 hepatitis ABC negative.   Immune studies negative, iron studies normal.  MRI of liver mild fatty liver.  F/u LFTS normal.    Osteopenia 14    Sarcoidosis     Skin cancer     nose     Past Surgical History:   Procedure Laterality Date    BREAST BIOPSY Right 3/5/2020    BREAST LESION BIOPSY EXCISION NEEDLE LOCALIZATION MASS RIGHT performed by Mary Jerry MD at [address] 2020     right breast mass removal by Dr. Park Kellogg in [place] [address]      COLONOSCOPY  2008    f/u 5 years- Dr Kaveh Arredondo      resection of brain tumor Dr. Brandyn Leyva LITHOTRIPSY      Gilberto Dennis      Dr Eddie Diaz History     Tobacco Use    Smoking status: Former Smoker     Packs/day: 0.75     Years: 20.00     Pack years: 15.00     Last attempt to quit: 2003     Years since quittin.2    Smokeless tobacco: Never Used   Substance Use Topics    Alcohol use: No     Alcohol/week: 0.0 standard drinks       LAB REVIEW:  CBC:   Lab Results   Component Value Date    WBC 8.0 2020    HGB 14.3 2020    HCT 44.4 2020     2020     Lipids:   Lab Results   Component Value Date    CHOL 170 10/13/2018    TRIG 148 10/13/2018    HDL 52 10/09/2019    LDLCALC 73 10/09/2019    LDLDIRECT 90 2019    TRIGLYCFAST 206 (H) 10/09/2019     Renal:   Lab Results   Component Value Date    BUN 16 2020    CREATININE 0.9 2020     2020    K 4.7 2020    ALT 23 2020    AST 16 2020    GLUCOSE 98 2020     PT/INR:   Lab Results   Component Value Date    INR 0.91 10/14/2012     A1C:   Lab Results   Component Value Date    LABA1C 6.3 2020           Darron Keyes MD, 11/3/2020 , 4:46 PM

## 2020-11-05 ENCOUNTER — TELEPHONE (OUTPATIENT)
Dept: INTERNAL MEDICINE CLINIC | Age: 70
End: 2020-11-05

## 2020-11-05 NOTE — TELEPHONE ENCOUNTER
Called pt to do AWV, but pt stated she had this visit done by her insurance company in 5/2020. Pt is trying to locate the paperwork and will bring it to our office so we can scan it into her HM.

## 2021-02-01 RX ORDER — ATORVASTATIN CALCIUM 40 MG/1
40 TABLET, FILM COATED ORAL DAILY
Qty: 90 TABLET | Refills: 1 | Status: SHIPPED | OUTPATIENT
Start: 2021-02-01 | End: 2021-11-12

## 2021-02-03 ENCOUNTER — OFFICE VISIT (OUTPATIENT)
Dept: INTERNAL MEDICINE CLINIC | Age: 71
End: 2021-02-03
Payer: MEDICARE

## 2021-02-03 VITALS
OXYGEN SATURATION: 98 % | BODY MASS INDEX: 32.43 KG/M2 | SYSTOLIC BLOOD PRESSURE: 118 MMHG | RESPIRATION RATE: 22 BRPM | WEIGHT: 201.8 LBS | HEART RATE: 78 BPM | DIASTOLIC BLOOD PRESSURE: 72 MMHG | TEMPERATURE: 97.8 F | HEIGHT: 66 IN

## 2021-02-03 DIAGNOSIS — J44.9 CHRONIC OBSTRUCTIVE PULMONARY DISEASE, UNSPECIFIED COPD TYPE (HCC): ICD-10-CM

## 2021-02-03 DIAGNOSIS — E78.2 MIXED HYPERLIPIDEMIA: ICD-10-CM

## 2021-02-03 DIAGNOSIS — F32.A DEPRESSION, UNSPECIFIED DEPRESSION TYPE: ICD-10-CM

## 2021-02-03 DIAGNOSIS — H91.93 BILATERAL HEARING LOSS, UNSPECIFIED HEARING LOSS TYPE: ICD-10-CM

## 2021-02-03 DIAGNOSIS — D86.9 SARCOIDOSIS: ICD-10-CM

## 2021-02-03 DIAGNOSIS — C71.9 OLIGOASTROCYTOMA, WHO GRADE 2 (HCC): ICD-10-CM

## 2021-02-03 DIAGNOSIS — E11.9 DIABETES MELLITUS, NEW ONSET (HCC): Primary | ICD-10-CM

## 2021-02-03 PROCEDURE — 1036F TOBACCO NON-USER: CPT | Performed by: INTERNAL MEDICINE

## 2021-02-03 PROCEDURE — G8417 CALC BMI ABV UP PARAM F/U: HCPCS | Performed by: INTERNAL MEDICINE

## 2021-02-03 PROCEDURE — 99214 OFFICE O/P EST MOD 30 MIN: CPT | Performed by: INTERNAL MEDICINE

## 2021-02-03 PROCEDURE — G8399 PT W/DXA RESULTS DOCUMENT: HCPCS | Performed by: INTERNAL MEDICINE

## 2021-02-03 PROCEDURE — 1123F ACP DISCUSS/DSCN MKR DOCD: CPT | Performed by: INTERNAL MEDICINE

## 2021-02-03 PROCEDURE — G8926 SPIRO NO PERF OR DOC: HCPCS | Performed by: INTERNAL MEDICINE

## 2021-02-03 PROCEDURE — 3023F SPIROM DOC REV: CPT | Performed by: INTERNAL MEDICINE

## 2021-02-03 PROCEDURE — G9899 SCRN MAM PERF RSLTS DOC: HCPCS | Performed by: INTERNAL MEDICINE

## 2021-02-03 PROCEDURE — G8482 FLU IMMUNIZE ORDER/ADMIN: HCPCS | Performed by: INTERNAL MEDICINE

## 2021-02-03 PROCEDURE — 3017F COLORECTAL CA SCREEN DOC REV: CPT | Performed by: INTERNAL MEDICINE

## 2021-02-03 PROCEDURE — 3046F HEMOGLOBIN A1C LEVEL >9.0%: CPT | Performed by: INTERNAL MEDICINE

## 2021-02-03 PROCEDURE — G8427 DOCREV CUR MEDS BY ELIG CLIN: HCPCS | Performed by: INTERNAL MEDICINE

## 2021-02-03 PROCEDURE — 1090F PRES/ABSN URINE INCON ASSESS: CPT | Performed by: INTERNAL MEDICINE

## 2021-02-03 PROCEDURE — 2022F DILAT RTA XM EVC RTNOPTHY: CPT | Performed by: INTERNAL MEDICINE

## 2021-02-03 PROCEDURE — 4040F PNEUMOC VAC/ADMIN/RCVD: CPT | Performed by: INTERNAL MEDICINE

## 2021-02-03 NOTE — PROGRESS NOTES
ABDOMEN:  Soft and non-tender,no masses  ororganomegaly. EXTREMITIES:  No cyanosis, clubbing, or significant edema. SKIN: Skin is warm and dry. NEUROLOGICAL:  Cranial nerves II through XII are grossly intact. IMPRESSION:    Encounter Diagnoses   Name Primary?  Diabetes mellitus, new onset ([address] Yes    Bilateral hearing loss, unspecified hearing loss type     Mixed hyperlipidemia     Sarcoidosis        ASSESSMENT/PLAN:     Chronic obstructive pulmonary disease (HCC)  PFT showed mod sees Dr Dio Wilkins RW  Used Anora and ventolin prn. Depression  Patient is states she is feeling well    Diabetes mellitus, new onset [facility])  Patient is stable. Continue current treatment. On Hga1c is 5.6    Hearing loss  Has hearing aids bilaterally    Mixed hyperlipidemia  Hyperlipidemia is stable. Follow low cholesterol diet. Continue current treatment. Oligoastrocytoma, WHO grade 2 ([address]  Pt has f/u appointment with neurologist    Sarcoidosis  Seen  Dr Roger Cornell. He saw patient and he started her on inhaler Anora and albuterol prn    Return to office in 3 months. Mediations reviewed with the patient. Continue current medications. Appropriate prescriptions are addressed. After visit summeryprovided. Follow up as directed sooner if needed. Questions answered and patient verbalizes understanding. Call for any problems, questions, or concerns.        Allergies   Allergen Reactions    Minocycline     Tetracyclines & Related     Demeclocycline Rash    Tetracycline Rash     Current Outpatient Medications   Medication Sig Dispense Refill    atorvastatin (LIPITOR) 40 MG tablet Take 1 tablet by mouth daily 90 tablet 1    escitalopram (LEXAPRO) 10 MG tablet Take 1 tablet by mouth daily 30 tablet 3    metFORMIN (GLUCOPHAGE-XR) 500 MG extended release tablet Take 1 tablet by mouth daily (with breakfast) 30 tablet 3    blood glucose test strips (TRUE METRIX BLOOD GLUCOSE TEST) strip Testing once daily, DX=E11.9 50 strip 5    Lancet Device MISC 1 Device by Does not apply route 2 times daily DX: E11.9      Lancets Ultra Thin 72X MISC 1 applicator by Does not apply route 2 times daily 100 each 5    UNABLE TO FIND Please dispense what insurance will cover, blood glucose test strips, DX=E11.9, testing once daily and prn 60 strip 2    umeclidinium-vilanterol (ANORO ELLIPTA) 62.5-25 MCG/INH AEPB inhaler 1 puff daily 1 each 5    loratadine (CLARITIN) 10 MG tablet Take 10 mg by mouth daily as needed        No current facility-administered medications for this visit. Past Medical History:   Diagnosis Date    Abnormal mammogram 8/14/2017    0.4 cm hypoechoic mass(most likely a small intramammary lymph node or less likely a complicated cyst.  Follow-up in 6 months recommended with ultrasound    Attempted suicide ([facility] [address] 2012    hanged herself.  Brain neoplasm ([facility] [address]     grade II Oligo resected 4/13, Dr. Emma Bojorquez resected tumor. Dr. Homero Menjivar oncologist seeing pt also. No chemo. Dr. Emma Bojorquez following pt q year. Tumor recurred, right frontal craniotomy and tumor resection 11/14 Dr. Cher Chilel. Had radiation to the brain     Chronic obstructive pulmonary disease ([facility] [address] 6/15/2016    PFT showed mod sees Dr Sarah Alfaro RW    Cyst of left kidney 9/11/2014    MR I of 7/14. follow-up in 6-12 months    Cyst of pancreas 9/11/2014    Needs follow-up in one year 7/15    Depression     Hearing loss     Hearing loss     Herniated disc     L4 &L5    Hyperlipidemia     Kidney stones     Liver dysfunction 6/6/2014    In 7/14 hepatitis ABC negative. Immune studies negative, iron studies normal.  MRI of liver mild fatty liver.  F/u LFTS normal.    Osteopenia 9/26/14    Sarcoidosis     Skin cancer     nose     Past Surgical History:   Procedure Laterality Date    BREAST BIOPSY Right 3/5/2020    BREAST LESION BIOPSY EXCISION NEEDLE LOCALIZATION MASS RIGHT performed by Denver De Luna MD at [address]

## 2021-02-08 ENCOUNTER — HOSPITAL ENCOUNTER (OUTPATIENT)
Age: 71
Discharge: HOME | End: 2021-02-08
Payer: MEDICARE

## 2021-02-08 ENCOUNTER — HOSPITAL ENCOUNTER (OUTPATIENT)
Dept: GENERAL RADIOLOGY | Age: 71
Discharge: HOME | End: 2021-02-08
Payer: MEDICARE

## 2021-02-08 DIAGNOSIS — D86.9 SARCOIDOSIS: ICD-10-CM

## 2021-02-08 LAB
ALBUMIN SERPL-MCNC: 4.2 GM/DL (ref 3.4–5)
ALP BLD-CCNC: 131 IU/L (ref 40–129)
ALT SERPL-CCNC: 21 U/L (ref 10–40)
ANION GAP SERPL CALCULATED.3IONS-SCNC: 2 MMOL/L (ref 4–16)
AST SERPL-CCNC: 13 IU/L (ref 15–37)
BILIRUB SERPL-MCNC: 0.5 MG/DL (ref 0–1)
BUN BLDV-MCNC: 17 MG/DL (ref 6–23)
CALCIUM SERPL-MCNC: 9.6 MG/DL (ref 8.3–10.6)
CHLORIDE BLD-SCNC: 104 MMOL/L (ref 99–110)
CHOLESTEROL, FASTING: 206 MG/DL
CO2: 32 MMOL/L (ref 21–32)
CREAT SERPL-MCNC: 0.8 MG/DL (ref 0.6–1.1)
GFR AFRICAN AMERICAN: >60 ML/MIN/1.73M2
GFR NON-AFRICAN AMERICAN: >60 ML/MIN/1.73M2
GLUCOSE FASTING: 105 MG/DL (ref 70–99)
HDLC SERPL-MCNC: 54 MG/DL
LDL CHOLESTEROL DIRECT: 118 MG/DL
POTASSIUM SERPL-SCNC: 4.7 MMOL/L (ref 3.5–5.1)
SODIUM BLD-SCNC: 138 MMOL/L (ref 135–145)
TOTAL PROTEIN: 7.3 GM/DL (ref 6.4–8.2)
TRIGLYCERIDE, FASTING: 186 MG/DL

## 2021-02-08 PROCEDURE — 80061 LIPID PANEL: CPT

## 2021-02-08 PROCEDURE — 36415 COLL VENOUS BLD VENIPUNCTURE: CPT

## 2021-02-08 PROCEDURE — 80053 COMPREHEN METABOLIC PANEL: CPT

## 2021-02-08 PROCEDURE — 71046 X-RAY EXAM CHEST 2 VIEWS: CPT

## 2021-02-09 PROBLEM — H53.9 VISUAL DISTURBANCE: Status: RESOLVED | Noted: 2020-04-27 | Resolved: 2021-02-09

## 2021-02-09 ASSESSMENT — ENCOUNTER SYMPTOMS
ALLERGIC/IMMUNOLOGIC NEGATIVE: 1
EYES NEGATIVE: 1
GASTROINTESTINAL NEGATIVE: 1
RESPIRATORY NEGATIVE: 1

## 2021-03-23 RX ORDER — METFORMIN HYDROCHLORIDE 500 MG/1
500 TABLET, EXTENDED RELEASE ORAL
Qty: 30 TABLET | Refills: 3 | Status: SHIPPED | OUTPATIENT
Start: 2021-03-23 | End: 2021-04-27 | Stop reason: SDUPTHER

## 2021-04-27 ENCOUNTER — OFFICE VISIT (OUTPATIENT)
Dept: INTERNAL MEDICINE CLINIC | Age: 71
End: 2021-04-27
Payer: MEDICARE

## 2021-04-27 VITALS
DIASTOLIC BLOOD PRESSURE: 74 MMHG | OXYGEN SATURATION: 98 % | HEART RATE: 68 BPM | BODY MASS INDEX: 33.15 KG/M2 | SYSTOLIC BLOOD PRESSURE: 128 MMHG | RESPIRATION RATE: 16 BRPM | WEIGHT: 205.4 LBS

## 2021-04-27 DIAGNOSIS — F32.A DEPRESSION, UNSPECIFIED DEPRESSION TYPE: ICD-10-CM

## 2021-04-27 DIAGNOSIS — E78.2 MIXED HYPERLIPIDEMIA: ICD-10-CM

## 2021-04-27 DIAGNOSIS — J44.9 CHRONIC OBSTRUCTIVE PULMONARY DISEASE, UNSPECIFIED COPD TYPE (HCC): Primary | ICD-10-CM

## 2021-04-27 DIAGNOSIS — H91.93 BILATERAL HEARING LOSS, UNSPECIFIED HEARING LOSS TYPE: ICD-10-CM

## 2021-04-27 DIAGNOSIS — E11.9 DIABETES MELLITUS, NEW ONSET (HCC): ICD-10-CM

## 2021-04-27 DIAGNOSIS — C71.9 OLIGOASTROCYTOMA, WHO GRADE 2 (HCC): ICD-10-CM

## 2021-04-27 DIAGNOSIS — D86.9 SARCOIDOSIS: ICD-10-CM

## 2021-04-27 PROCEDURE — G9899 SCRN MAM PERF RSLTS DOC: HCPCS | Performed by: INTERNAL MEDICINE

## 2021-04-27 PROCEDURE — 3046F HEMOGLOBIN A1C LEVEL >9.0%: CPT | Performed by: INTERNAL MEDICINE

## 2021-04-27 PROCEDURE — G8399 PT W/DXA RESULTS DOCUMENT: HCPCS | Performed by: INTERNAL MEDICINE

## 2021-04-27 PROCEDURE — 4040F PNEUMOC VAC/ADMIN/RCVD: CPT | Performed by: INTERNAL MEDICINE

## 2021-04-27 PROCEDURE — 3023F SPIROM DOC REV: CPT | Performed by: INTERNAL MEDICINE

## 2021-04-27 PROCEDURE — G8427 DOCREV CUR MEDS BY ELIG CLIN: HCPCS | Performed by: INTERNAL MEDICINE

## 2021-04-27 PROCEDURE — G8926 SPIRO NO PERF OR DOC: HCPCS | Performed by: INTERNAL MEDICINE

## 2021-04-27 PROCEDURE — 1090F PRES/ABSN URINE INCON ASSESS: CPT | Performed by: INTERNAL MEDICINE

## 2021-04-27 PROCEDURE — 99214 OFFICE O/P EST MOD 30 MIN: CPT | Performed by: INTERNAL MEDICINE

## 2021-04-27 PROCEDURE — 3017F COLORECTAL CA SCREEN DOC REV: CPT | Performed by: INTERNAL MEDICINE

## 2021-04-27 PROCEDURE — 1123F ACP DISCUSS/DSCN MKR DOCD: CPT | Performed by: INTERNAL MEDICINE

## 2021-04-27 PROCEDURE — 2022F DILAT RTA XM EVC RTNOPTHY: CPT | Performed by: INTERNAL MEDICINE

## 2021-04-27 PROCEDURE — G8417 CALC BMI ABV UP PARAM F/U: HCPCS | Performed by: INTERNAL MEDICINE

## 2021-04-27 PROCEDURE — 1036F TOBACCO NON-USER: CPT | Performed by: INTERNAL MEDICINE

## 2021-04-27 RX ORDER — ESCITALOPRAM OXALATE 10 MG/1
10 TABLET ORAL DAILY
Qty: 90 TABLET | Refills: 1 | Status: SHIPPED | OUTPATIENT
Start: 2021-04-27 | End: 2021-07-28 | Stop reason: SDUPTHER

## 2021-04-27 RX ORDER — METFORMIN HYDROCHLORIDE 500 MG/1
500 TABLET, EXTENDED RELEASE ORAL
Qty: 90 TABLET | Refills: 1 | Status: SHIPPED | OUTPATIENT
Start: 2021-04-27 | End: 2021-07-26 | Stop reason: SDUPTHER

## 2021-04-27 ASSESSMENT — PATIENT HEALTH QUESTIONNAIRE - PHQ9
SUM OF ALL RESPONSES TO PHQ QUESTIONS 1-9: 2
1. LITTLE INTEREST OR PLEASURE IN DOING THINGS: 1
SUM OF ALL RESPONSES TO PHQ QUESTIONS 1-9: 2
SUM OF ALL RESPONSES TO PHQ9 QUESTIONS 1 & 2: 2
SUM OF ALL RESPONSES TO PHQ QUESTIONS 1-9: 2

## 2021-04-27 NOTE — PROGRESS NOTES
Tameka Rojas  Patient's  is 1950  Seen in office on 2021      SUBJECTIVE:  Gilbert gonzales 66 y. o.year old female presents today   Chief Complaint   Patient presents with    Diabetes    Discuss Medications     lexapro, has been out and she did not know if she is to continue medication    Medication Refill     Patient is here for follow-up of diabetes, depression and hypertension  Patient states she saw Dr. Marge Haddad in [place]. .  Brain neoplasm follow-up is stable per patient. Follow-up in 8 months  Patient denies any chest pain. No shortness of breath. No cough or sputum production no abdominal pain. No nausea, vomiting or diarrhea. Denies any falls. Patient states she is feeling more depressed but no suicidal ideations. She is not taking the Lexapro. Diabetes is also in control the blood sugar readings are good. Running between 100-1 30. Metformin was decreased last time in January and the blood sugars are still stable. No hypoglycemic symptoms  Taking medications regularly. No side effects noted. Review of Systems  Review of system normal except as in HPI  OBJECTIVE: /74   Pulse 68   Resp 16   Wt 205 lb 6.4 oz (93.2 kg)   SpO2 98%   BMI 33.15 kg/m²     Wt Readings from Last 3 Encounters:   21 205 lb 6.4 oz (93.2 kg)   21 201 lb 12.8 oz (91.5 kg)   21 200 lb (90.7 kg)      Patient was seen taking COVID-19 precautions. Face mask, gloves and eyes protectors were used. Patient also wore facemask. GENERAL:  Alert, oriented, pleasant, in no apparent distress. HEENT:  Conjunctiva pink, no scleral icterus. ENT clear. NECK: Supple. No jugular venous distention noted. No masses felt,  CARDIOVASCULAR:  Normal S1 and S2    PULMONARY:  No respiratory distress. No wheezes or rales. ABDOMEN:  Soft and non-tender,no masses  ororganomegaly. EXTREMITIES:  No cyanosis, clubbing, or significant edema. SKIN: Skin is warm and dry.    NEUROLOGICAL:  Cranial nerves II through XII are grossly intact. IMPRESSION:    Encounter Diagnoses   Name Primary?  Chronic obstructive pulmonary disease, unspecified COPD type ([facility] [address] Yes    Depression, unspecified depression type     Diabetes mellitus, new onset ([facility] [address]     Mixed hyperlipidemia     Sarcoidosis     Oligoastrocytoma, WHO grade 2 ([facility] [address]     Bilateral hearing loss, unspecified hearing loss type        ASSESSMENT/PLAN:  .  COPD stable. Continue the inhalers  . Hyperlipidemia. Lipid profile was slightly elevated in January 2020. Continue atorvastatin and follow the diet. .  Sarcoidosis. Stable. Patient sees pulmonologist  .  Depression: Restart Lexapro. Refer patient to [facility]. If depression increases go to ER or call  . Chronic hearing loss. .  Oligo astrocytoma: Seen Dr. Cristal Rojas on 4/27/2021. Is stable follow-up in 8 months  . Diabetes mellitus: Continue Metformin  mg daily    Orders Placed This Encounter   Procedures    External Referral To Psychiatry         Mediations reviewed with the patient. Continue current medications. Appropriate prescriptions are addressed. After visit summeryprovided. Follow up as directed sooner if needed. Questions answered and patient verbalizes understanding. Call for any problems, questions, or concerns.        Allergies   Allergen Reactions    Minocycline     Tetracyclines & Related     Demeclocycline Rash    Tetracycline Rash     Current Outpatient Medications   Medication Sig Dispense Refill    escitalopram (LEXAPRO) 10 MG tablet Take 1 tablet by mouth daily 90 tablet 1    metFORMIN (GLUCOPHAGE-XR) 500 MG extended release tablet Take 1 tablet by mouth daily (with breakfast) 90 tablet 1    atorvastatin (LIPITOR) 40 MG tablet Take 1 tablet by mouth daily 90 tablet 1    blood glucose test strips (TRUE METRIX BLOOD GLUCOSE TEST) strip Testing once daily, DX=E11.9 50 strip 5    Lancet Device MISC 1 Device by Does not apply route 2 times daily DX: E11.9     f/u 5 years- Dr Renee Estrada      resection of brain tumor Dr. Shilpa Argueta LITHOTRIPSY      Lizzette Barreto      Dr Eladio Sorensen History     Tobacco Use    Smoking status: Former Smoker     Packs/day: 0.75     Years: 20.00     Pack years: 15.00     Quit date: 2003     Years since quittin.6    Smokeless tobacco: Never Used   Substance Use Topics    Alcohol use: No     Alcohol/week: 0.0 standard drinks       LAB REVIEW:  CBC:   Lab Results   Component Value Date    WBC 8.0 2020    HGB 14.3 2020    HCT 44.4 2020     2020     Lipids:   Lab Results   Component Value Date    HDL 54 2021    LDLCALC 73 10/09/2019    LDLDIRECT 118 (H) 2021    TRIGLYCFAST 186 (H) 2021    CHOLFAST 206 (H) 2021     Renal:   Lab Results   Component Value Date    BUN 17 2021    CREATININE 0.8 2021     2021    K 4.7 2021    ALT 21 2021    AST 13 2021    GLUCOSE 98 2020    GLUF 105 2021     PT/INR:   Lab Results   Component Value Date    INR 0.91 10/14/2012     A1C:   Lab Results   Component Value Date    LABA1C 5.6 2020           Carin Jerry MD, 2021 , 11:35 AM

## 2021-07-26 RX ORDER — METFORMIN HYDROCHLORIDE 500 MG/1
500 TABLET, EXTENDED RELEASE ORAL
Qty: 90 TABLET | Refills: 1 | Status: SHIPPED | OUTPATIENT
Start: 2021-07-26 | End: 2021-11-01 | Stop reason: SDUPTHER

## 2021-07-28 ENCOUNTER — OFFICE VISIT (OUTPATIENT)
Dept: INTERNAL MEDICINE CLINIC | Age: 71
End: 2021-07-28
Payer: MEDICARE

## 2021-07-28 VITALS
RESPIRATION RATE: 20 BRPM | TEMPERATURE: 97.7 F | DIASTOLIC BLOOD PRESSURE: 68 MMHG | HEART RATE: 80 BPM | SYSTOLIC BLOOD PRESSURE: 134 MMHG | BODY MASS INDEX: 33.51 KG/M2 | OXYGEN SATURATION: 97 % | WEIGHT: 207.6 LBS

## 2021-07-28 DIAGNOSIS — E78.2 MIXED HYPERLIPIDEMIA: ICD-10-CM

## 2021-07-28 DIAGNOSIS — Z12.31 SCREENING MAMMOGRAM, ENCOUNTER FOR: ICD-10-CM

## 2021-07-28 DIAGNOSIS — J44.9 CHRONIC OBSTRUCTIVE PULMONARY DISEASE, UNSPECIFIED COPD TYPE (HCC): ICD-10-CM

## 2021-07-28 DIAGNOSIS — D86.9 SARCOIDOSIS: ICD-10-CM

## 2021-07-28 DIAGNOSIS — E11.9 DIABETES MELLITUS, NEW ONSET (HCC): Primary | ICD-10-CM

## 2021-07-28 DIAGNOSIS — H91.93 BILATERAL HEARING LOSS, UNSPECIFIED HEARING LOSS TYPE: ICD-10-CM

## 2021-07-28 DIAGNOSIS — C71.9 OLIGOASTROCYTOMA, WHO GRADE 2 (HCC): ICD-10-CM

## 2021-07-28 DIAGNOSIS — F32.A DEPRESSION, UNSPECIFIED DEPRESSION TYPE: ICD-10-CM

## 2021-07-28 DIAGNOSIS — R92.8 ABNORMAL MAMMOGRAM: ICD-10-CM

## 2021-07-28 DIAGNOSIS — K76.89 LIVER DYSFUNCTION: ICD-10-CM

## 2021-07-28 DIAGNOSIS — K86.2 CYST OF PANCREAS: ICD-10-CM

## 2021-07-28 LAB — HBA1C MFR BLD: 5.5 %

## 2021-07-28 PROCEDURE — 1036F TOBACCO NON-USER: CPT | Performed by: INTERNAL MEDICINE

## 2021-07-28 PROCEDURE — 3023F SPIROM DOC REV: CPT | Performed by: INTERNAL MEDICINE

## 2021-07-28 PROCEDURE — 4040F PNEUMOC VAC/ADMIN/RCVD: CPT | Performed by: INTERNAL MEDICINE

## 2021-07-28 PROCEDURE — 1090F PRES/ABSN URINE INCON ASSESS: CPT | Performed by: INTERNAL MEDICINE

## 2021-07-28 PROCEDURE — G8926 SPIRO NO PERF OR DOC: HCPCS | Performed by: INTERNAL MEDICINE

## 2021-07-28 PROCEDURE — G8427 DOCREV CUR MEDS BY ELIG CLIN: HCPCS | Performed by: INTERNAL MEDICINE

## 2021-07-28 PROCEDURE — 83036 HEMOGLOBIN GLYCOSYLATED A1C: CPT | Performed by: INTERNAL MEDICINE

## 2021-07-28 PROCEDURE — G8399 PT W/DXA RESULTS DOCUMENT: HCPCS | Performed by: INTERNAL MEDICINE

## 2021-07-28 PROCEDURE — 1123F ACP DISCUSS/DSCN MKR DOCD: CPT | Performed by: INTERNAL MEDICINE

## 2021-07-28 PROCEDURE — G9899 SCRN MAM PERF RSLTS DOC: HCPCS | Performed by: INTERNAL MEDICINE

## 2021-07-28 PROCEDURE — 99214 OFFICE O/P EST MOD 30 MIN: CPT | Performed by: INTERNAL MEDICINE

## 2021-07-28 PROCEDURE — 3044F HG A1C LEVEL LT 7.0%: CPT | Performed by: INTERNAL MEDICINE

## 2021-07-28 PROCEDURE — G8417 CALC BMI ABV UP PARAM F/U: HCPCS | Performed by: INTERNAL MEDICINE

## 2021-07-28 PROCEDURE — 3017F COLORECTAL CA SCREEN DOC REV: CPT | Performed by: INTERNAL MEDICINE

## 2021-07-28 PROCEDURE — 2022F DILAT RTA XM EVC RTNOPTHY: CPT | Performed by: INTERNAL MEDICINE

## 2021-07-28 RX ORDER — ESCITALOPRAM OXALATE 10 MG/1
10 TABLET ORAL DAILY
Qty: 90 TABLET | Refills: 1 | Status: SHIPPED | OUTPATIENT
Start: 2021-07-28 | End: 2022-08-04 | Stop reason: DRUGHIGH

## 2021-07-28 ASSESSMENT — PATIENT HEALTH QUESTIONNAIRE - PHQ9
SUM OF ALL RESPONSES TO PHQ QUESTIONS 1-9: 1
1. LITTLE INTEREST OR PLEASURE IN DOING THINGS: 0
SUM OF ALL RESPONSES TO PHQ9 QUESTIONS 1 & 2: 1
SUM OF ALL RESPONSES TO PHQ QUESTIONS 1-9: 1
2. FEELING DOWN, DEPRESSED OR HOPELESS: 1
SUM OF ALL RESPONSES TO PHQ QUESTIONS 1-9: 1

## 2021-07-28 NOTE — ASSESSMENT & PLAN NOTE
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 Jostin Kong resected tumor. Dr. Moncada See oncologist seeing pt also. No chemo. Tumor recurred, right frontal craniotomy and tumor resection 11/14 Dr. Roxanna Burrell. Had radiation to the brain  Dr. Wilfrido Vicente following pt q year. Pt states she had MRI brain in 7 or 8/2017: was told it was stable. F/u in one year  Seen Dr Moncada Board last week and she was told to be stable. Seen Dr Roxanna Burrell 1/2020 : stable.   Patient states she has upcoming appointment with Dr. Roxanna Burrell

## 2021-07-28 NOTE — ASSESSMENT & PLAN NOTE
Patient has diabetes mellitus and the blood sugars are running well as per patient's log. Hemoglobin A1c is also 5.5  Continue Metformin  mg daily.

## 2021-07-28 NOTE — PROGRESS NOTES
Diabetes mellitus, new onset ([facility] [address] Yes    Mixed hyperlipidemia     Bilateral hearing loss, unspecified hearing loss type     Depression, unspecified depression type     Chronic obstructive pulmonary disease, unspecified COPD type ([address]     Abnormal mammogram     Liver dysfunction     Screening mammogram, encounter for     Cyst of pancreas     Sarcoidosis     Oligoastrocytoma, WHO grade 2 ([address]        ASSESSMENT/PLAN:    Diabetes mellitus, new onset ([facility] [address]      Patient has diabetes mellitus and the blood sugars are running well as per patient's log. Hemoglobin A1c is also 5.5  Continue Metformin  mg daily. Depression  Patient has history of depression and suicidal ideation in the past.  She was started on Lexapro 10 mg daily. Continue the same and follow-up with counseling at [address]. Cyst of pancreas       Chronic obstructive pulmonary disease (HCC)    PFT showed mod sees Dr Marcela Marshall RW  Used Anora and ventolin prn. Abnormal mammogram      Biopsy was negative. Patient is due for routine mammogram.  Will order    Sarcoidosis    Seen  Dr Bryce White. He saw patient and he started her on inhaler Anora and albuterol prn    Oligoastrocytoma, WHO grade 2 ([address]    grade II Oligo resected 4/13, Dr. Talia Álvarez resected tumor. Dr. Víctor García oncologist seeing pt also. No chemo. Tumor recurred, right frontal craniotomy and tumor resection 11/14 Dr. Juanjo Dueñas. Had radiation to the brain  Dr. Jarrett Mark following pt q year. Pt states she had MRI brain in 7 or 8/2017: was told it was stable. F/u in one year  Seen Dr Steve Raphael last week and she was told to be stable. Seen Dr Juanjo Dueñas 1/2020 : stable. Patient states she has upcoming appointment with Dr. Juanjo Dueñas    Mixed hyperlipidemia    patient is taking atorvastatin. Will recheck lipid profile    Liver dysfunction      LFTs were normal on 2/8/2021. Recheck    Hearing loss  Patient uses hearing aids.     Orders Placed This Encounter   Procedures    [facility] seeing pt also. No chemo. Dr. Irlnada Lafleur following pt q year. Tumor recurred, right frontal craniotomy and tumor resection  Dr. Denise Moe. Had radiation to the brain     Chronic obstructive pulmonary disease ([facility] [address] 6/15/2016    PFT showed mod sees Dr Harjit Sarmiento RW    Cyst of left kidney 2014    MR I of . follow-up in 6-12 months    Cyst of pancreas 2014    Needs follow-up in one year 7/15    Depression     Hearing loss     Hearing loss     Herniated disc     L4 &L5    Hyperlipidemia     Kidney stones     Liver dysfunction 2014    In  hepatitis ABC negative. Immune studies negative, iron studies normal.  MRI of liver mild fatty liver.  F/u LFTS normal.    Osteopenia 14    Sarcoidosis     Skin cancer     nose     Past Surgical History:   Procedure Laterality Date    BREAST BIOPSY Right 3/5/2020    BREAST LESION BIOPSY EXCISION NEEDLE LOCALIZATION MASS RIGHT performed by Steve Bach MD at [place] [address] Right 2020     right breast mass removal by Dr. Will Bosch in [place] [address]      COLONOSCOPY  2008    f/u 5 years- Dr Barron Dk      resection of brain tumor Dr. Daniel Robbins LITHOTRIPSY  2005   Melinda Harris      Dr Ruffin History     Tobacco Use    Smoking status: Former Smoker     Packs/day: 0.75     Years: 20.00     Pack years: 15.00     Quit date: 2003     Years since quittin.9    Smokeless tobacco: Never Used   Substance Use Topics    Alcohol use: No     Alcohol/week: 0.0 standard drinks       LAB REVIEW:  CBC:   Lab Results   Component Value Date    WBC 8.0 2020    HGB 14.3 2020    HCT 44.4 2020     2020     Lipids:   Lab Results   Component Value Date    HDL 54 2021    LDLCALC 73 10/09/2019    LDLDIRECT 118 (H) 2021    TRIGLYCFAST 186 (H) 2021    CHOLFAST 206 (H) 2021 Renal:   Lab Results   Component Value Date    BUN 17 02/08/2021    CREATININE 0.8 02/08/2021     02/08/2021    K 4.7 02/08/2021    ALT 21 02/08/2021    AST 13 02/08/2021    GLUCOSE 98 07/20/2020    GLUF 105 02/08/2021     PT/INR:   Lab Results   Component Value Date    INR 0.91 10/14/2012     A1C:   Lab Results   Component Value Date    LABA1C 5.5 07/28/2021           Uche Gill MD, 7/28/2021 , 1:45 PM

## 2021-07-28 NOTE — ASSESSMENT & PLAN NOTE
Patient has history of depression and suicidal ideation in the past.  She was started on Lexapro 10 mg daily. Continue the same and follow-up with counseling at [address].

## 2021-08-09 PROBLEM — G20 PARKINSON'S DISEASE (HCC): Status: ACTIVE | Noted: 2021-08-09

## 2021-08-09 PROBLEM — G20.A1 PARKINSON'S DISEASE: Status: ACTIVE | Noted: 2021-08-09

## 2021-08-19 ENCOUNTER — HOSPITAL ENCOUNTER (OUTPATIENT)
Dept: MAMMOGRAPHY | Age: 71
Discharge: HOME | End: 2021-08-19
Payer: MEDICARE

## 2021-08-19 DIAGNOSIS — Z12.31 SCREENING MAMMOGRAM, ENCOUNTER FOR: ICD-10-CM

## 2021-08-19 PROCEDURE — 77067 SCR MAMMO BI INCL CAD: CPT

## 2021-08-26 ENCOUNTER — HOSPITAL ENCOUNTER (OUTPATIENT)
Dept: ULTRASOUND IMAGING | Age: 71
Discharge: HOME | End: 2021-08-26
Payer: MEDICARE

## 2021-08-26 DIAGNOSIS — R92.8 ABNORMAL FINDING ON BREAST IMAGING: ICD-10-CM

## 2021-08-26 PROCEDURE — 76642 ULTRASOUND BREAST LIMITED: CPT

## 2021-08-29 DIAGNOSIS — N63.10 BREAST MASS, RIGHT: Primary | ICD-10-CM

## 2021-08-29 DIAGNOSIS — R92.8 ABNORMAL MAMMOGRAM: ICD-10-CM

## 2021-08-31 ENCOUNTER — TELEPHONE (OUTPATIENT)
Dept: INTERNAL MEDICINE CLINIC | Age: 71
End: 2021-08-31

## 2021-08-31 NOTE — TELEPHONE ENCOUNTER
----- Message from Rigobertowalter Whelan sent at 8/30/2021  2:20 PM EDT -----  Subject: Message to Provider    QUESTIONS  Information for Provider? Pt states she received a call to schedule an   ultrasound, she said she had one already done at the hospital last week   and wants to know why she has to have another one.  ---------------------------------------------------------------------------  --------------  CALL BACK INFO  What is the best way for the office to contact you? OK to leave message on   voicemail  Preferred Call Back Phone Number? [phone]  ---------------------------------------------------------------------------  --------------  SCRIPT ANSWERS  Relationship to Patient?  Self

## 2021-08-31 NOTE — TELEPHONE ENCOUNTER
Informed patient of US breast results from  8- 1328 hours. Benign findings follow up is recommended in Feb 2022. Verbal understanding returned.

## 2021-09-01 ENCOUNTER — TELEPHONE (OUTPATIENT)
Dept: FAMILY MEDICINE CLINIC | Age: 71
End: 2021-09-01

## 2021-10-29 ENCOUNTER — HOSPITAL ENCOUNTER (OUTPATIENT)
Age: 71
Discharge: HOME | End: 2021-10-29
Payer: MEDICARE

## 2021-10-29 LAB
ALBUMIN SERPL-MCNC: 4.2 GM/DL (ref 3.4–5)
ALP BLD-CCNC: 128 IU/L (ref 40–129)
ALT SERPL-CCNC: 35 U/L (ref 10–40)
ANION GAP SERPL CALCULATED.3IONS-SCNC: 4 MMOL/L (ref 4–16)
AST SERPL-CCNC: 18 IU/L (ref 15–37)
BASOPHILS ABSOLUTE: 0.1 K/CU MM
BASOPHILS RELATIVE PERCENT: 0.8 % (ref 0–1)
BILIRUB SERPL-MCNC: 0.4 MG/DL (ref 0–1)
BUN BLDV-MCNC: 15 MG/DL (ref 6–23)
CALCIUM SERPL-MCNC: 9.3 MG/DL (ref 8.3–10.6)
CHLORIDE BLD-SCNC: 106 MMOL/L (ref 99–110)
CHOLESTEROL, FASTING: 187 MG/DL
CO2: 30 MMOL/L (ref 21–32)
CREAT SERPL-MCNC: 0.8 MG/DL (ref 0.6–1.1)
DIFFERENTIAL TYPE: ABNORMAL
EOSINOPHILS ABSOLUTE: 0.1 K/CU MM
EOSINOPHILS RELATIVE PERCENT: 1.8 % (ref 0–3)
ESTIMATED AVERAGE GLUCOSE: 131 MG/DL
GFR AFRICAN AMERICAN: >60 ML/MIN/1.73M2
GFR NON-AFRICAN AMERICAN: >60 ML/MIN/1.73M2
GLUCOSE FASTING: 126 MG/DL (ref 70–99)
HBA1C MFR BLD: 6.2 % (ref 4.2–6.3)
HCT VFR BLD CALC: 45.5 % (ref 37–47)
HDLC SERPL-MCNC: 57 MG/DL
HEMOGLOBIN: 14.5 GM/DL (ref 12.5–16)
IMMATURE NEUTROPHIL %: 0.5 % (ref 0–0.43)
LDL CHOLESTEROL DIRECT: 108 MG/DL
LYMPHOCYTES ABSOLUTE: 1.1 K/CU MM
LYMPHOCYTES RELATIVE PERCENT: 17.9 % (ref 24–44)
MCH RBC QN AUTO: 27.7 PG (ref 27–31)
MCHC RBC AUTO-ENTMCNC: 31.9 % (ref 32–36)
MCV RBC AUTO: 87 FL (ref 78–100)
MONOCYTES ABSOLUTE: 0.5 K/CU MM
MONOCYTES RELATIVE PERCENT: 8.4 % (ref 0–4)
PDW BLD-RTO: 13.1 % (ref 11.7–14.9)
PLATELET # BLD: 279 K/CU MM (ref 140–440)
PMV BLD AUTO: 9.4 FL (ref 7.5–11.1)
POTASSIUM SERPL-SCNC: 4.5 MMOL/L (ref 3.5–5.1)
RBC # BLD: 5.23 M/CU MM (ref 4.2–5.4)
SEGMENTED NEUTROPHILS ABSOLUTE COUNT: 4.4 K/CU MM
SEGMENTED NEUTROPHILS RELATIVE PERCENT: 70.6 % (ref 36–66)
SODIUM BLD-SCNC: 140 MMOL/L (ref 135–145)
TOTAL IMMATURE NEUTOROPHIL: 0.03 K/CU MM
TOTAL PROTEIN: 6.8 GM/DL (ref 6.4–8.2)
TRIGLYCERIDE, FASTING: 154 MG/DL
TSH HIGH SENSITIVITY: 1.5 UIU/ML (ref 0.27–4.2)
VITAMIN D 25-HYDROXY: 18.12 NG/ML
WBC # BLD: 6.2 K/CU MM (ref 4–10.5)

## 2021-10-29 PROCEDURE — 80053 COMPREHEN METABOLIC PANEL: CPT

## 2021-10-29 PROCEDURE — 82306 VITAMIN D 25 HYDROXY: CPT

## 2021-10-29 PROCEDURE — 85025 COMPLETE CBC W/AUTO DIFF WBC: CPT

## 2021-10-29 PROCEDURE — 36415 COLL VENOUS BLD VENIPUNCTURE: CPT

## 2021-10-29 PROCEDURE — 84443 ASSAY THYROID STIM HORMONE: CPT

## 2021-10-29 PROCEDURE — 83036 HEMOGLOBIN GLYCOSYLATED A1C: CPT

## 2021-10-29 PROCEDURE — 80061 LIPID PANEL: CPT

## 2021-11-01 ENCOUNTER — OFFICE VISIT (OUTPATIENT)
Dept: INTERNAL MEDICINE CLINIC | Age: 71
End: 2021-11-01
Payer: MEDICARE

## 2021-11-01 VITALS
DIASTOLIC BLOOD PRESSURE: 80 MMHG | OXYGEN SATURATION: 96 % | BODY MASS INDEX: 34.55 KG/M2 | WEIGHT: 215 LBS | HEART RATE: 83 BPM | HEIGHT: 66 IN | SYSTOLIC BLOOD PRESSURE: 120 MMHG

## 2021-11-01 DIAGNOSIS — C71.9 OLIGOASTROCYTOMA, WHO GRADE 2 (HCC): ICD-10-CM

## 2021-11-01 DIAGNOSIS — E11.9 DIABETES MELLITUS, NEW ONSET (HCC): ICD-10-CM

## 2021-11-01 DIAGNOSIS — F32.A DEPRESSION, UNSPECIFIED DEPRESSION TYPE: ICD-10-CM

## 2021-11-01 DIAGNOSIS — K86.2 CYST OF PANCREAS: ICD-10-CM

## 2021-11-01 DIAGNOSIS — J44.9 CHRONIC OBSTRUCTIVE PULMONARY DISEASE, UNSPECIFIED COPD TYPE (HCC): ICD-10-CM

## 2021-11-01 DIAGNOSIS — E78.2 MIXED HYPERLIPIDEMIA: Primary | ICD-10-CM

## 2021-11-01 PROCEDURE — 3023F SPIROM DOC REV: CPT | Performed by: INTERNAL MEDICINE

## 2021-11-01 PROCEDURE — 4040F PNEUMOC VAC/ADMIN/RCVD: CPT | Performed by: INTERNAL MEDICINE

## 2021-11-01 PROCEDURE — 99214 OFFICE O/P EST MOD 30 MIN: CPT | Performed by: INTERNAL MEDICINE

## 2021-11-01 PROCEDURE — G8926 SPIRO NO PERF OR DOC: HCPCS | Performed by: INTERNAL MEDICINE

## 2021-11-01 PROCEDURE — 1036F TOBACCO NON-USER: CPT | Performed by: INTERNAL MEDICINE

## 2021-11-01 PROCEDURE — 3017F COLORECTAL CA SCREEN DOC REV: CPT | Performed by: INTERNAL MEDICINE

## 2021-11-01 PROCEDURE — 2022F DILAT RTA XM EVC RTNOPTHY: CPT | Performed by: INTERNAL MEDICINE

## 2021-11-01 PROCEDURE — G8417 CALC BMI ABV UP PARAM F/U: HCPCS | Performed by: INTERNAL MEDICINE

## 2021-11-01 PROCEDURE — G8428 CUR MEDS NOT DOCUMENT: HCPCS | Performed by: INTERNAL MEDICINE

## 2021-11-01 PROCEDURE — G8484 FLU IMMUNIZE NO ADMIN: HCPCS | Performed by: INTERNAL MEDICINE

## 2021-11-01 PROCEDURE — G9899 SCRN MAM PERF RSLTS DOC: HCPCS | Performed by: INTERNAL MEDICINE

## 2021-11-01 PROCEDURE — 3044F HG A1C LEVEL LT 7.0%: CPT | Performed by: INTERNAL MEDICINE

## 2021-11-01 PROCEDURE — 1090F PRES/ABSN URINE INCON ASSESS: CPT | Performed by: INTERNAL MEDICINE

## 2021-11-01 PROCEDURE — G8399 PT W/DXA RESULTS DOCUMENT: HCPCS | Performed by: INTERNAL MEDICINE

## 2021-11-01 PROCEDURE — 1123F ACP DISCUSS/DSCN MKR DOCD: CPT | Performed by: INTERNAL MEDICINE

## 2021-11-01 RX ORDER — METFORMIN HYDROCHLORIDE 500 MG/1
500 TABLET, EXTENDED RELEASE ORAL
Qty: 90 TABLET | Refills: 1 | Status: SHIPPED | OUTPATIENT
Start: 2021-11-01 | End: 2022-02-01 | Stop reason: SDUPTHER

## 2021-11-01 RX ORDER — MELATONIN
2000 DAILY
Qty: 30 TABLET | Refills: 5
Start: 2021-11-01

## 2021-11-01 NOTE — PROGRESS NOTES
[facility]  Patient's  is 1950  Seen in office on 2021      SUBJECTIVE:  Kasie gonzales 66 y. o.year old female presents today   Chief Complaint   Patient presents with    3 Month Follow-Up     Patient is here for follow-up of hyperlipidemia, depression, COPD and diabetes. Patient states she is feeling well except that she has to take and has an appointment with dentist.  Patient has diabetes mellitus and the blood sugars are running well. Taking Metformin. No hypoglycemia. Patient has depression and is on Lexapro tolerating it well. Patient has COPD and is on inhalers. No cough or sputum production. Patient has hyperlipidemia and is on atorvastatin. Taking medications regularly. No side effects noted. Review of Systems  Review of system normal except as in HPI  OBJECTIVE: /80   Pulse 83   Ht 5' 5.5\" (1.664 m)   Wt 215 lb (97.5 kg)   SpO2 96%   BMI 35.23 kg/m²     Wt Readings from Last 3 Encounters:   21 215 lb (97.5 kg)   21 211 lb (95.7 kg)   21 210 lb (95.3 kg)      GENERAL:  Alert, oriented, pleasant, in no apparent distress. HEENT:  Conjunctiva pink, no scleral icterus. ENT clear. NECK: Supple. No jugular venous distention noted. No masses felt,  CARDIOVASCULAR:  Normal S1 and S2    PULMONARY:  No respiratory distress. No wheezes or rales. ABDOMEN:  Soft and non-tender,no masses  ororganomegaly. EXTREMITIES:  No cyanosis, clubbing, or significant edema. SKIN: Skin is warm and dry. NEUROLOGICAL:  Cranial nerves II through XII are grossly intact. IMPRESSION:    Encounter Diagnoses   Name Primary?  Mixed hyperlipidemia Yes    Diabetes mellitus, new onset ([facility] [address]     Depression, unspecified depression type     Oligoastrocytoma, WHO grade 2 (HCC)     Cyst of pancreas     Chronic obstructive pulmonary disease, unspecified COPD type (HCC)        ASSESSMENT/PLAN:    .  Diabetes mellitus uncontrolled. Continue Metformin.   Hemoglobin A1c 6.2  . Hyperlipidemia: LDL slightly elevated. Follow low-cholesterol diet and atorvastatin  . Depression in control. Continue current medications  . Oligo astrocytoma. Patient sees neurosurgeon  . Has cyst in the pancreas stable  . COPD stable continue inhalers    Stable   Has tooth inf she will call dentist   Vitamin D is low. Start taking vit 5000 unit a day     Return to office in 3 months      Mediations reviewed with the patient. Continue current medications. Appropriate prescriptions are addressed. After visit summeryprovided. Follow up as directed sooner if needed. Questions answered and patient verbalizes understanding. Call for any problems, questions, or concerns.        Allergies   Allergen Reactions    Minocycline     Tetracyclines & Related     Demeclocycline Rash    Tetracycline Rash     Current Outpatient Medications   Medication Sig Dispense Refill    rOPINIRole (REQUIP) 0.5 MG tablet Take 0.5 mg by mouth daily       escitalopram (LEXAPRO) 10 MG tablet Take 1 tablet by mouth daily (Patient taking differently: Take 10 mg by mouth daily Take 1 1/2 tablet daily) 90 tablet 1    metFORMIN (GLUCOPHAGE-XR) 500 MG extended release tablet Take 1 tablet by mouth daily (with breakfast) 90 tablet 1    umeclidinium-vilanterol (ANORO ELLIPTA) 62.5-25 MCG/INH AEPB inhaler 1 puff daily 1 each 5    atorvastatin (LIPITOR) 40 MG tablet Take 1 tablet by mouth daily 90 tablet 1    blood glucose test strips (TRUE METRIX BLOOD GLUCOSE TEST) strip Testing once daily, DX=E11.9 50 strip 5    Lancet Device MISC 1 Device by Does not apply route 2 times daily DX: E11.9      Lancets Ultra Thin 39Z MISC 1 applicator by Does not apply route 2 times daily 100 each 5    UNABLE TO FIND Please dispense what insurance will cover, blood glucose test strips, DX=E11.9, testing once daily and prn 60 strip 2    loratadine (CLARITIN) 10 MG tablet Take 10 mg by mouth daily as needed        No current facility-administered medications for this visit. Past Medical History:   Diagnosis Date    Abnormal mammogram 8/14/2017    0.4 cm hypoechoic mass(most likely a small intramammary lymph node or less likely a complicated cyst.  Follow-up in 6 months recommended with ultrasound    Attempted suicide ([facility] [address] 2012    hanged herself.  Brain neoplasm ([facility] [address]     grade II Oligo resected 4/13, Dr. Ariana Moncada resected tumor. Dr. Celsa Ingram oncologist seeing pt also. No chemo. Dr. Ariana Moncada following pt q year. Tumor recurred, right frontal craniotomy and tumor resection 11/14 Dr. Sd Lunsford. Had radiation to the brain     Chronic obstructive pulmonary disease ([facility] [address] 6/15/2016    PFT showed mod sees Dr Robert Patino RW    Cyst of left kidney 9/11/2014    MR I of 7/14. follow-up in 6-12 months    Cyst of pancreas 9/11/2014    Needs follow-up in one year 7/15    Depression     Hearing loss     Hearing loss     Herniated disc     L4 &L5    Hyperlipidemia     Kidney stones     Liver dysfunction 6/6/2014    In 7/14 hepatitis ABC negative. Immune studies negative, iron studies normal.  MRI of liver mild fatty liver.  F/u LFTS normal.    Osteopenia 9/26/14    Sarcoidosis     Skin cancer     nose     Past Surgical History:   Procedure Laterality Date    BREAST BIOPSY Right 3/5/2020    BREAST LESION BIOPSY EXCISION NEEDLE LOCALIZATION MASS RIGHT performed by Silva Luis MD at [address] Right 03/05/2020     right breast mass removal by Dr. Ellie Mayo in [place] [address]  2006    COLONOSCOPY  2008    f/u 5 years- Dr Curt Chapa  4/13    resection of brain tumor Dr. Miguel Katz LITHOTRIPSY  2005   Jose Armando Viji  2011    Dr Marcos Desir History     Tobacco Use    Smoking status: Former Smoker     Packs/day: 0.75     Years: 20.00     Pack years: 15.00     Quit date: 8/22/2003     Years since quitting: 18.2    Smokeless tobacco: Never Used   Substance Use Topics    Alcohol use: No     Alcohol/week: 0.0 standard drinks       LAB REVIEW:  CBC:   Lab Results   Component Value Date    WBC 6.2 10/29/2021    HGB 14.5 10/29/2021    HCT 45.5 10/29/2021     10/29/2021     Lipids:   Lab Results   Component Value Date    HDL 57 10/29/2021    LDLCALC 73 10/09/2019    LDLDIRECT 108 (H) 10/29/2021    TRIGLYCFAST 154 (H) 10/29/2021    CHOLFAST 187 10/29/2021     Renal:   Lab Results   Component Value Date    BUN 15 10/29/2021    CREATININE 0.8 10/29/2021     10/29/2021    K 4.5 10/29/2021    ALT 35 10/29/2021    AST 18 10/29/2021    GLUCOSE 98 07/20/2020    GLUF 126 10/29/2021     PT/INR:   Lab Results   Component Value Date    INR 0.91 10/14/2012     A1C:   Lab Results   Component Value Date    LABA1C 6.2 10/29/2021           Misty Smith MD, 11/1/2021 , 11:05 AM

## 2021-11-12 RX ORDER — ATORVASTATIN CALCIUM 40 MG/1
40 TABLET, FILM COATED ORAL DAILY
Qty: 90 TABLET | Refills: 1 | Status: SHIPPED | OUTPATIENT
Start: 2021-11-12 | End: 2022-05-04 | Stop reason: SDUPTHER

## 2021-12-03 LAB — DIABETIC RETINOPATHY: NEGATIVE

## 2022-02-01 ENCOUNTER — OFFICE VISIT (OUTPATIENT)
Dept: INTERNAL MEDICINE CLINIC | Age: 72
End: 2022-02-01
Payer: MEDICARE

## 2022-02-01 VITALS
BODY MASS INDEX: 35.62 KG/M2 | OXYGEN SATURATION: 98 % | DIASTOLIC BLOOD PRESSURE: 72 MMHG | HEIGHT: 65 IN | SYSTOLIC BLOOD PRESSURE: 132 MMHG | WEIGHT: 213.8 LBS | RESPIRATION RATE: 16 BRPM | HEART RATE: 72 BPM | TEMPERATURE: 97 F

## 2022-02-01 VITALS
TEMPERATURE: 97 F | HEIGHT: 65 IN | OXYGEN SATURATION: 98 % | SYSTOLIC BLOOD PRESSURE: 132 MMHG | HEART RATE: 72 BPM | BODY MASS INDEX: 35.62 KG/M2 | DIASTOLIC BLOOD PRESSURE: 72 MMHG | RESPIRATION RATE: 16 BRPM | WEIGHT: 213.8 LBS

## 2022-02-01 DIAGNOSIS — H91.93 BILATERAL HEARING LOSS, UNSPECIFIED HEARING LOSS TYPE: ICD-10-CM

## 2022-02-01 DIAGNOSIS — F32.A DEPRESSION, UNSPECIFIED DEPRESSION TYPE: ICD-10-CM

## 2022-02-01 DIAGNOSIS — C71.9 OLIGOASTROCYTOMA, WHO GRADE 2 (HCC): ICD-10-CM

## 2022-02-01 DIAGNOSIS — J44.9 CHRONIC OBSTRUCTIVE PULMONARY DISEASE, UNSPECIFIED COPD TYPE (HCC): ICD-10-CM

## 2022-02-01 DIAGNOSIS — K76.89 LIVER DYSFUNCTION: ICD-10-CM

## 2022-02-01 DIAGNOSIS — D86.9 SARCOIDOSIS: ICD-10-CM

## 2022-02-01 DIAGNOSIS — E78.2 MIXED HYPERLIPIDEMIA: ICD-10-CM

## 2022-02-01 DIAGNOSIS — E11.9 DIABETES MELLITUS, NEW ONSET (HCC): Primary | ICD-10-CM

## 2022-02-01 DIAGNOSIS — N28.1 CYST OF LEFT KIDNEY: ICD-10-CM

## 2022-02-01 DIAGNOSIS — Z00.00 ROUTINE GENERAL MEDICAL EXAMINATION AT A HEALTH CARE FACILITY: Primary | ICD-10-CM

## 2022-02-01 PROCEDURE — 3023F SPIROM DOC REV: CPT | Performed by: INTERNAL MEDICINE

## 2022-02-01 PROCEDURE — 2022F DILAT RTA XM EVC RTNOPTHY: CPT | Performed by: INTERNAL MEDICINE

## 2022-02-01 PROCEDURE — G8482 FLU IMMUNIZE ORDER/ADMIN: HCPCS | Performed by: INTERNAL MEDICINE

## 2022-02-01 PROCEDURE — G8399 PT W/DXA RESULTS DOCUMENT: HCPCS | Performed by: INTERNAL MEDICINE

## 2022-02-01 PROCEDURE — 99214 OFFICE O/P EST MOD 30 MIN: CPT | Performed by: INTERNAL MEDICINE

## 2022-02-01 PROCEDURE — 1123F ACP DISCUSS/DSCN MKR DOCD: CPT | Performed by: INTERNAL MEDICINE

## 2022-02-01 PROCEDURE — 3046F HEMOGLOBIN A1C LEVEL >9.0%: CPT | Performed by: INTERNAL MEDICINE

## 2022-02-01 PROCEDURE — G8427 DOCREV CUR MEDS BY ELIG CLIN: HCPCS | Performed by: INTERNAL MEDICINE

## 2022-02-01 PROCEDURE — G8417 CALC BMI ABV UP PARAM F/U: HCPCS | Performed by: INTERNAL MEDICINE

## 2022-02-01 PROCEDURE — 3017F COLORECTAL CA SCREEN DOC REV: CPT | Performed by: INTERNAL MEDICINE

## 2022-02-01 PROCEDURE — 4040F PNEUMOC VAC/ADMIN/RCVD: CPT | Performed by: INTERNAL MEDICINE

## 2022-02-01 PROCEDURE — 1036F TOBACCO NON-USER: CPT | Performed by: INTERNAL MEDICINE

## 2022-02-01 PROCEDURE — G0439 PPPS, SUBSEQ VISIT: HCPCS | Performed by: INTERNAL MEDICINE

## 2022-02-01 PROCEDURE — 1090F PRES/ABSN URINE INCON ASSESS: CPT | Performed by: INTERNAL MEDICINE

## 2022-02-01 RX ORDER — METFORMIN HYDROCHLORIDE 500 MG/1
500 TABLET, EXTENDED RELEASE ORAL
Qty: 90 TABLET | Refills: 1 | Status: SHIPPED | OUTPATIENT
Start: 2022-02-01 | End: 2022-08-04 | Stop reason: SDUPTHER

## 2022-02-01 RX ORDER — VITAMIN E 268 MG
400 CAPSULE ORAL
COMMUNITY
Start: 2022-01-17 | End: 2022-05-19 | Stop reason: CLARIF

## 2022-02-01 ASSESSMENT — PATIENT HEALTH QUESTIONNAIRE - PHQ9
SUM OF ALL RESPONSES TO PHQ QUESTIONS 1-9: 1
2. FEELING DOWN, DEPRESSED OR HOPELESS: 0
SUM OF ALL RESPONSES TO PHQ QUESTIONS 1-9: 1
SUM OF ALL RESPONSES TO PHQ9 QUESTIONS 1 & 2: 1
1. LITTLE INTEREST OR PLEASURE IN DOING THINGS: 1

## 2022-02-01 ASSESSMENT — LIFESTYLE VARIABLES: HOW OFTEN DO YOU HAVE A DRINK CONTAINING ALCOHOL: 0

## 2022-02-01 NOTE — ASSESSMENT & PLAN NOTE
Blood sugar around 500.   Hemoglobin A1c 12.3 on 4/21/2020  Started on metformin 500 mg twice a day  Hemoglobin A1c has decreased to 5.6  Metformin ER decreased to 500 mg daily : hga1c is 6.2

## 2022-02-01 NOTE — PATIENT INSTRUCTIONS
Advance Directives: Care Instructions  Overview  An advance directive is a legal way to state your wishes at the end of your life. It tells your family and your doctor what to do if you can't say what you want. There are two main types of advance directives. You can change them any time your wishes change. Living will. This form tells your family and your doctor your wishes about life support and other treatment. The form is also called a declaration. Medical power of . This form lets you name a person to make treatment decisions for you when you can't speak for yourself. This person is called a health care agent (health care proxy, health care surrogate). The form is also called a durable power of  for health care. If you do not have an advance directive, decisions about your medical care may be made by a family member, or by a doctor or a  who doesn't know you. It may help to think of an advance directive as a gift to the people who care for you. If you have one, they won't have to make tough decisions by themselves. Follow-up care is a key part of your treatment and safety. Be sure to make and go to all appointments, and call your doctor if you are having problems. It's also a good idea to know your test results and keep a list of the medicines you take. What should you include in an advance directive? Many states have a unique advance directive form. (It may ask you to address specific issues.) Or you might use a universal form that's approved by many states. If your form doesn't tell you what to address, it may be hard to know what to include in your advance directive. Use the questions below to help you get started. · Who do you want to make decisions about your medical care if you are not able to? · What life-support measures do you want if you have a serious illness that gets worse over time or can't be cured? · What are you most afraid of that might happen?  (Maybe you're afraid of having pain, losing your independence, or being kept alive by machines.)  · Where would you prefer to die? (Your home? A hospital? A nursing home?)  · Do you want to donate your organs when you die? · Do you want certain Mandaeism practices performed before you die? When should you call for help? Be sure to contact your doctor if you have any questions. Where can you learn more? Go to [email or web address] and sign in to your [employer] account. Enter R264 in the MyClasses box to learn more about \"Advance Directives: Care Instructions. \"     If you do not have an account, please click on the \"Sign Up Now\" link. Current as of: March 17, 2021               Content Version: 13.1  © 6010-7667 [employer]. Care instructions adapted under license by [facility] ([facility]). If you have questions about a medical condition or this instruction, always ask your healthcare professional. [place] [address] any warranty or liability for your use of this information. Learning About Medical Power of   What is a medical power of ? A medical power of , also called a durable power of  for health care, is one type of the legal forms called advance directives. It lets you name the person you want to make treatment decisions for you if you can't speak or decide for yourself. The person you choose is called your health care agent. This person is also called a health care proxy or health care surrogate. A medical power of  may be called something else in your state. How do you choose a health care agent? Choose your health care agent carefully. This person may or may not be a family member. Talk to the person before you make your final decision. Make sure he or she is comfortable with this responsibility. It's a good idea to choose someone who:  · Is at least 25years old.   · Knows you well and understands what makes life meaningful for you. · Understands your Rastafarian and moral values. · Will do what you want, not what he or she wants. · Will be able to make difficult choices at a stressful time. · Will be able to refuse or stop treatment, if that is what you would want, even if you could die. · Will be firm and confident with health professionals if needed. · Will ask questions to get needed information. · Lives near you or agrees to travel to you if needed. Your family may help you make medical decisions while you can still be part of that process. But it's important to choose one person to be your health care agent in case you aren't able to make decisions for yourself. If you don't fill out the legal form and name a health care agent, the decisions your family can make may be limited. A health care agent may be called something else in your state. Who will make decisions for you if you don't have a health care agent? If you don't have a health care agent or a living will, you may not get the care you want. Decisions may be made by family members who disagree about your medical care. Or decisions may be made by a medical professional who doesn't know you well. In some cases, a  makes the decisions. When you name a health care agent, it is very clear who has the power to make health decisions for you. How do you name a health care agent? You name your health care agent on a legal form. This form is usually called a medical power of . Ask your hospital, state bar association, or office on aging where to find these forms. You must sign the form to make it legal. Some states require you to get the form notarized. This means that a person called a  watches you sign the form and then he or she signs the form. Some states also require that two or more witnesses sign the form. Be sure to tell your family members and doctors who your health care agent is.   Where can you learn more?  Go to [email or web address] org and sign in to your [employer] account. Enter [id number] in the [facility] box to learn more about \"Learning About [address]. \"     If you do not have an account, please click on the \"Sign Up Now\" link. Current as of: March 17, 2021               Content Version: 13.1  © 4384-2838 Health[employer], [employer]. Care instructions adapted under license by [facility] ([facility]). If you have questions about a medical condition or this instruction, always ask your healthcare professional. [address] any warranty or liability for your use of this information. Learning About Living Perroy  What is a living will? A living will, also called a declaration, is a legal form. It tells your family and your doctor your wishes when you can't speak for yourself. It's used by the health professionals who will treat you as you near the end of your life or if you get seriously hurt or ill. If you put your wishes in writing, your loved ones and others will know what kind of care you want. They won't need to guess. This can ease your mind and be helpful to others. And you can change or cancel your living will at any time. A living will is not the same as an estate or property will. An estate will explains what you want to happen with your money and property after you die. How do you use it? A living will is used to describe the kinds of treatment or life support you want as you near the end of your life or if you get seriously hurt or ill. Keep these facts in mind about living hay. · Your living will is used only if you can't speak or make decisions for yourself. Most often, one or more doctors must certify that you can't speak or decide for yourself before your living will takes effect. · If you get better and can speak for yourself again, you can accept or refuse any treatment.  It doesn't matter what you said in your living will.  · Some states may limit your right to refuse treatment in certain cases. For example, you may need to clearly state in your living will that you don't want artificial hydration and nutrition, such as being fed through a tube. Is a living will a legal document? A living will is a legal document. Each state has its own laws about living hay. And a living will may be called something else in your state. Here are some things to know about living hay. · You don't need an  to complete a living will. But legal advice can be helpful if your state's laws are unclear. It can also help if your health history is complicated or your family can't agree on what should be in your living will. · You can change your living will at any time. Some people find that their wishes about end-of-life care change as their health changes. If you make big changes to your living will, complete a new form. · If you move to another state, make sure that your living will is legal in the state where you now live. In most cases, doctors will respect your wishes even if you have a form from a different state. · You might use a universal form that has been approved by many states. This kind of form can sometimes be filled out and stored online. Your digital copy will then be available wherever you have a connection to the internet. The doctors and nurses who need to treat you can find it right away. · Your state may offer an online registry. This is another place where you can store your living will online. · It's a good idea to get your living will notarized. This means using a person called a  to watch two people sign, or witness, your living will. What should you know when you create a living will? Here are some questions to ask yourself as you make your living will:  · Do you know enough about life support methods that might be used?  If not, talk to your doctor so you know what might be done if you can't information. Personalized Preventive Plan for Imelda Gains - 2/1/2022  Medicare offers a range of preventive health benefits. Some of the tests and screenings are paid in full while other may be subject to a deductible, co-insurance, and/or copay. Some of these benefits include a comprehensive review of your medical history including lifestyle, illnesses that may run in your family, and various assessments and screenings as appropriate. After reviewing your medical record and screening and assessments performed today your provider may have ordered immunizations, labs, imaging, and/or referrals for you. A list of these orders (if applicable) as well as your Preventive Care list are included within your After Visit Summary for your review. Other Preventive Recommendations:    · A preventive eye exam performed by an eye specialist is recommended every 1-2 years to screen for glaucoma; cataracts, macular degeneration, and other eye disorders. · A preventive dental visit is recommended every 6 months. · Try to get at least 150 minutes of exercise per week or 10,000 steps per day on a pedometer . · Order or download the FREE \"Exercise & Physical Activity: Your Everyday Guide\" from [employer]. Call 0-535.531.9133 or search [employer] online. · You need 7100-1817 mg of calcium and 2192-8485 IU of vitamin D per day. It is possible to meet your calcium requirement with diet alone, but a vitamin D supplement is usually necessary to meet this goal.  · When exposed to the sun, use a sunscreen that protects against both UVA and UVB radiation with an SPF of 30 or greater. Reapply every 2 to 3 hours or after sweating, drying off with a towel, or swimming. · Always wear a seat belt when traveling in a car. Always wear a helmet when riding a bicycle or motorcycle.

## 2022-02-01 NOTE — ASSESSMENT & PLAN NOTE
.Hyperlipidemia is stable. Follow low cholesterol diet. Continue current treatment.   contnue atorvastatin

## 2022-02-01 NOTE — ASSESSMENT & PLAN NOTE
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 Jf Edwards resected tumor. Dr. Khai Ledesma oncologist seeing pt also. No chemo. Tumor recurred, right frontal craniotomy and tumor resection 11/14 Dr. Katheleen Eisenmenger. Had radiation to the brain  Dr. Ana Lamar following pt q year. Pt states she had MRI brain in 7 or 8/2017: was told it was stable. F/u in one year  Seen Dr Loly Nunez last week and she was told to be stable. Seen Dr Katheleen Eisenmenger 1/2020 : stable. 12/21 : stable.

## 2022-02-01 NOTE — PROGRESS NOTES
Medicare Annual Wellness Visit  Name: Rosalinda Farias Date: 2022   MRN: [id number] Sex: Female   Age: 66 y.o. Ethnicity: Non- / Non    : 1950 Race: White (non-)      Salma Perez is here for Medicare AWV    Screenings for behavioral, psychosocial and functional/safety risks, and cognitive dysfunction are all negative except as indicated below. These results, as well as other patient data from the [address] form, are documented in Flowsheets linked to this Encounter. Allergies   Allergen Reactions    Minocycline     Tetracyclines & Related     Demeclocycline Rash    Tetracycline Rash       Prior to Visit Medications    Medication Sig Taking?  Authorizing Provider   metFORMIN (GLUCOPHAGE-XR) 500 MG extended release tablet Take 1 tablet by mouth daily (with breakfast)  Jong Livingston MD   Calcium Carbonate (CALCIUM 600 PO) Take 600 mg by mouth 2 times daily  Historical Provider, MD   vitamin E 400 UNIT capsule 400 Units  Historical Provider, MD   atorvastatin (LIPITOR) 40 MG tablet TAKE 1 TABLET BY MOUTH DAILY  Jong Livingston MD   vitamin D3 (CHOLECALCIFEROL) 25 MCG (1000 UT) TABS tablet Take 2 tablets by mouth daily  Jong Livingston MD   rOPINIRole (REQUIP) 0.5 MG tablet Take 0.5 mg by mouth daily   Historical Provider, MD   escitalopram (LEXAPRO) 10 MG tablet Take 1 tablet by mouth daily  Patient taking differently: Take 10 mg by mouth daily Take 1 1/2 tablet daily  Jong Livingston MD   umeclidinium-vilanterol (ANORO ELLIPTA) 62.5-25 MCG/INH AEPB inhaler 1 puff daily  Sandhya Sanchez MD   blood glucose test strips (TRUE METRIX BLOOD GLUCOSE TEST) strip Testing once daily, DX=E11.9  Jong Livingston MD   Lancet Device MISC 1 Device by Does not apply route 2 times daily DX: E11.9  Historical Provider, MD   Lancets Ultra Thin 80R MISC 1 applicator by Does not apply route 2 times daily  Jong Livingston MD   UNABLE TO FIND Please dispense what insurance will cover, blood glucose test strips, DX=E11.9, testing once daily and liliann  Syeda Reyes MD   loratadine (CLARITIN) 10 MG tablet Take 10 mg by mouth daily as needed   Patient not taking: Reported on 2/1/2022  Historical Provider, MD       Past Medical History:   Diagnosis Date    Abnormal mammogram 8/14/2017    0.4 cm hypoechoic mass(most likely a small intramammary lymph node or less likely a complicated cyst.  Follow-up in 6 months recommended with ultrasound    Attempted suicide ([facility] 2012    hanged herself. 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 James Mix resected tumor. Dr. Annemarie Esquivel oncologist seeing pt also. No chemo. Dr. James Mix following pt q year. Tumor recurred, right frontal craniotomy and tumor resection 11/14 Dr. Jaz Baxter. Had radiation to the brain     Chronic obstructive pulmonary disease ([facility] 6/15/2016    PFT showed mod sees Dr Gris Mayo RW    Cyst of left kidney 9/11/2014    MR I of 7/14. follow-up in 6-12 months    Cyst of pancreas 9/11/2014    Needs follow-up in one year 7/15    Depression     Hearing loss     Hearing loss     Herniated disc     L4 &L5    Hyperlipidemia     Kidney stones     Liver dysfunction 6/6/2014    In 7/14 hepatitis ABC negative. Immune studies negative, iron studies normal.  MRI of liver mild fatty liver.  F/u LFTS normal.    Osteopenia 9/26/14    Sarcoidosis     Skin cancer     nose       Past Surgical History:   Procedure Laterality Date    BREAST BIOPSY Right 3/5/2020    BREAST LESION BIOPSY EXCISION NEEDLE LOCALIZATION MASS RIGHT performed by Barrington Obregon MD at [address] Right 03/05/2020     right breast mass removal by Dr. Maya Phipps in [facility] 44  2006    COLONOSCOPY  2008    f/u 5 years- Dr Johny Graham  4/13    resection of brain tumor Dr. Krista Alves  2011    Dr Jim He Family History   Problem Relation Age of Onset    Heart Attack Mother     Cancer Father         leukemia    Breast Cancer Sister 45    Breast Cancer Maternal Aunt        CareTeam (Including outside providers/suppliers regularly involved in providing care):   Patient Care Team:  Severino Shi MD as PCP - General (Internal Medicine)  [facility]ever[facility] Shi [facility]    Wt Readings from Last 3 Encounters:   02/01/22 213 lb 12.8 oz (97 kg)   02/01/22 213 lb 12.8 oz (97 kg)   11/10/21 211 lb (95.7 kg)     Vitals:    02/01/22 1041   BP: 132/72   Pulse: 72   Resp: 16   Temp: 97 °F (36.1 °C)   TempSrc: Temporal   SpO2: 98%   Weight: 213 lb 12.8 oz (97 kg)   Height: 5' 5\" (1.651 m)     Body mass index is 35.58 kg/m². Based upon direct observation of the patient, evaluation of cognition reveals recent and remote memory intact. Normal examination    Patient's complete Health Risk Assessment and screening values have been reviewed and are found in Flowsheets. The following problems were reviewed today and where indicated follow up appointments were made and/or referrals ordered. Positive Risk Factor Screenings with Interventions:      Cognitive:  Clock Drawing Test (CDT) Score: Normal  Total Score Interpretation: Positive Mini-Cog  Did the patient refuse to take the cognition test?: No  Cognitive Impairment Interventions:  · Patient declines any further evaluation/treatment for cognitive impairment         General Health and ACP:  General  In general, how would you say your health is?: Good  In the past 7 days, have you experienced any of the following?  New or Increased Pain, New or Increased Fatigue, Loneliness, Social Isolation, Stress or Anger?: (!) Stress,Loneliness  Do you get the social and emotional support that you need?: Yes  Do you have a Living Will?: (!) No  Advance Directives     Power of  Living Will ACP-Advance Directive ACP-Power of     Not on File Not on File Not on File Not on File      General Health Risk Interventions:  · No Living Will: Advance Care Planning addressed with patient today    Health Habits/Nutrition:  Health Habits/Nutrition  Do you exercise for at least 20 minutes 2-3 times per week?: (!) No  Have you lost any weight without trying in the past 3 months?: No  Do you eat only one meal per day?: No  Have you seen the dentist within the past year?: Yes  Body mass index: (!) 35.57  Health Habits/Nutrition Interventions:  · Inadequate physical activity:  pt is ready to exercise like walking only     Hearing/Vision:  No exam data present  Hearing/Vision  Do you or your family notice any trouble with your hearing that hasn't been managed with hearing aids?: (!) Yes  Do you have difficulty driving, watching TV, or doing any of your daily activities because of your eyesight?: (!) Yes  Have you had an eye exam within the past year?: Yes  Hearing/Vision Interventions:  · Hearing concerns:  patient declines any further evaluation/treatment for hearing issues    Safety:  Safety  Do you have working smoke detectors?: Yes  Have all throw rugs been removed or fastened?: Yes  Do you have non-slip mats or surfaces in all bathtubs/showers?: (!) No  Do all of your stairways have a railing or banister?: Yes  Are your doorways, halls and stairs free of clutter?: Yes  Do you always fasten your seatbelt when you are in a car?: Yes  Safety Interventions:  · Home safety tips provided       Personalized Preventive Plan   Current Health Maintenance Status  Immunization History   Administered Date(s) Administered    COVID-19Luis Enrique Katie, Primary or Immunocompromised, PF, 100mcg/0.5mL 02/25/2021, 03/25/2021, 11/05/2021    COVID-19, Pfizer Purple top, DILUTE for use, 12+ yrs, 30mcg/0.3mL dose 02/24/2021, 03/17/2021    Influenza Virus Vaccine 10/16/2012, 09/09/2013, 10/02/2014, 10/23/2014, 09/10/2015    Influenza, High Dose (Fluzone 65 yrs and older) 10/17/2017, 10/03/2019, 11/02/2020, 11/12/2021    Influenza, High-dose, Guille Sergeant, 65 yrs +, IM (Fluzone) 10/03/2020    Influenza, Quadv, IM, (6 mo and older Fluzone, Flulaval, Fluarix and 3 yrs and older Afluria) 11/02/2020    Influenza, Quadv, IM, PF (6 mo and older Fluzone, Flulaval, Fluarix, and 3 yrs and older Afluria) 09/29/2015, 10/04/2016, 10/05/2017, 10/18/2018    Influenza, Guille Sergeant, adjuvanted, 65 yrs +, IM, PF (Fluad) 10/22/2021    Influenza, Triv, 3 Years and older, IM (Afluria (5 yrs and older) 11/29/2016, 10/03/2018    Influenza, Triv, inactivated, subunit, adjuvanted, IM (Fluad 65 yrs and older) 10/15/2019    Pneumococcal Conjugate 13-valent ([id number]) 03/10/2016, 10/17/2016, 10/03/2020    Pneumococcal Polysaccharide ([id number]) 05/18/2011, 11/08/2014    Td (Adult), 2 Lf Tetanus Toxoid, Pf (Td, Absorbed) 06/03/2009    Tdap (Boostrix, Adacel) 10/15/2018    Zoster Live (Zostavax) 06/23/2014    Zoster Recombinant (Shingrix) 08/29/2019, 10/30/2019        Health Maintenance   Topic Date Due    Annual Wellness Visit (AWV)  Never done    Diabetic foot exam  11/03/2021    Diabetic microalbuminuria test  11/03/2021    Breast cancer screen  08/19/2022    A1C test (Diabetic or Prediabetic)  10/29/2022    Lipid screen  10/29/2022    Depression Monitoring  02/01/2023    Diabetic retinal exam  01/04/2024    Colon cancer screen colonoscopy  09/11/2024    DTaP/Tdap/Td vaccine (2 - Td or Tdap) 10/15/2028    DEXA (modify frequency per FRAX score)  Completed    Flu vaccine  Completed    Shingles Vaccine  Completed    Pneumococcal 65+ years Vaccine  Completed    COVID-19 Vaccine  Completed    Hepatitis C screen  Completed    Hepatitis A vaccine  Aged Out    Hib vaccine  Aged Out    Meningococcal (ACWY) vaccine  Aged Out     Recommendations for [employer] Due: see orders and patient instructions/AVS.    Discussed about living will  Foot exam done.    .  Recommended screening schedule for the next 5-10 years is provided to the patient in written form: see Patient Instructions/AVS.    Dejuan Lara was seen today for medicare awv.     Diagnoses and all orders for this visit:    Routine general medical examination at a health care facility

## 2022-02-01 NOTE — ASSESSMENT & PLAN NOTE
Seen  Dr Sotero Dela Cruz.    He saw patient and he started her on inhaler Anora and albuterol prn  Not using albuterol

## 2022-02-01 NOTE — PROGRESS NOTES
[facility]  Patient's  is 1950  Seen in office on 2022      SUBJECTIVE:  Wendy gonzales 66 y. o.year old female presents today   Chief Complaint   Patient presents with    Diabetes    Hyperlipidemia       Pt is here for f/u of DM HLD, COPD and brain tumor  Pt states she is feeling well. Patient has DM. No hypoglycemia. No numbness or weakness. No dizziness. Blood sugars are good at home. Patient has hyperlipidemia. Taking medications. No abdominal pain, no nausea or vomiting. No myalgias. COPD stable. Pt has seen surgeon at  [facility] for brain tumors  Stable per pt. No HA  No dizziness. Taking medications regularly. No side effects noted. Review of Systems    OBJECTIVE: /72   Pulse 72   Temp 97 °F (36.1 °C) (Temporal)   Resp 16   Ht 5' 5\" (1.651 m)   Wt 213 lb 12.8 oz (97 kg)   SpO2 98%   BMI 35.58 kg/m²     Wt Readings from Last 3 Encounters:   22 213 lb 12.8 oz (97 kg)   22 213 lb 12.8 oz (97 kg)   11/10/21 211 lb (95.7 kg)      GENERAL:  Alert, oriented, pleasant, in no apparent distress. HEENT:  Conjunctiva pink, no scleral icterus. ENT clear. NECK: Supple. No jugular venous distention noted. No masses felt,  CARDIOVASCULAR:  Normal S1 and S2    PULMONARY:  No respiratory distress. No wheezes or rales. ABDOMEN:  Soft and non-tender,no masses  ororganomegaly. EXTREMITIES:  No cyanosis, clubbing, or significant edema. SKIN: Skin is warm and dry. NEUROLOGICAL:  Cranial nerves II through XII are grossly intact.     Feet exam :   Visual inspection:  Deformity/amputation: absent  Skin lesions/pre-ulcerative calluses: absent  Edema: right- negative, left- negative    Sensory exam:  Monofilament sensation: normal  (minimum of 5 random plantar locations tested, avoiding callused areas - > 1 area with absence of sensation is + for neuropathy)    Plus at least one of the following:  Pulses: normal,   Pinprick: Intact          IMPRESSION:    Encounter Diagnoses   Name Primary?  Diabetes mellitus, new onset ([address]     Mixed hyperlipidemia     Sarcoidosis     Depression, unspecified depression type     Bilateral hearing loss, unspecified hearing loss type     Oligoastrocytoma, WHO grade 2 ([address]     Liver dysfunction     Cyst of left kidney     Chronic obstructive pulmonary disease, unspecified COPD type ([address]        ASSESSMENT/PLAN:     Diabetes mellitus, new onset ([address]    Blood sugar around 500. Hemoglobin A1c 12.3 on 4/21/2020  Started on metformin 500 mg twice a day  Hemoglobin A1c has decreased to 5.6  Metformin ER decreased to 500 mg daily : hga1c is 6.2    Mixed hyperlipidemia   . Hyperlipidemia is stable. Follow low cholesterol diet. Continue current treatment. contnue atorvastatin     Sarcoidosis    Seen  Dr Ana Maria Sibley. He saw patient and he started her on inhaler Anora and albuterol prn  Not using albuterol    Depression    Pt is going to [facility] and is on escitolopram now. Hearing loss    Has hearing aids bilaterally    Oligoastrocytoma, WHO grade 2 (HCC)    grade II Oligo resected 4/13, Dr. Claudetta Duverney resected tumor. Dr. Mickey Lobato oncologist seeing pt also. No chemo. Tumor recurred, right frontal craniotomy and tumor resection 11/14 Dr. Vicky Sepulveda. Had radiation to the brain  Dr. Tereza Mix following pt q year. Pt states she had MRI brain in 7 or 8/2017: was told it was stable. F/u in one year  Seen Dr Tenisha Beck last week and she was told to be stable. Seen Dr Vicky Sepulveda 1/2020 : stable. 12/21 : stable. Liver dysfunction    Stable     Cyst of left kidney       Chronic obstructive pulmonary disease (HCC)    PFT showed mod sees Dr Precious John RW  Used Anora and ventolin prn. Orders Placed This Encounter   Procedures     DIABETES FOOT EXAM         Mediations reviewed with the patient. Continue current medications. Appropriate prescriptions are addressed. After visit summeryprovided. Follow up as directed sooner if needed.   Questions answered and patient verbalizes understanding. Call for any problems, questions, or concerns. Allergies   Allergen Reactions    Minocycline     Tetracyclines & Related     Demeclocycline Rash    Tetracycline Rash     Current Outpatient Medications   Medication Sig Dispense Refill    Calcium Carbonate (CALCIUM 600 PO) Take 600 mg by mouth 2 times daily      vitamin E 400 UNIT capsule 400 Units      atorvastatin (LIPITOR) 40 MG tablet TAKE 1 TABLET BY MOUTH DAILY 90 tablet 1    vitamin D3 (CHOLECALCIFEROL) 25 MCG (1000 UT) TABS tablet Take 2 tablets by mouth daily 30 tablet 5    rOPINIRole (REQUIP) 0.5 MG tablet Take 0.5 mg by mouth daily       escitalopram (LEXAPRO) 10 MG tablet Take 1 tablet by mouth daily (Patient taking differently: Take 10 mg by mouth daily Take 1 1/2 tablet daily) 90 tablet 1    umeclidinium-vilanterol (ANORO ELLIPTA) 62.5-25 MCG/INH AEPB inhaler 1 puff daily 1 each 5    metFORMIN (GLUCOPHAGE-XR) 500 MG extended release tablet Take 1 tablet by mouth daily (with breakfast) 90 tablet 1    blood glucose test strips (TRUE METRIX BLOOD GLUCOSE TEST) strip Testing once daily, DX=E11.9 50 strip 5    Lancet Device MISC 1 Device by Does not apply route 2 times daily DX: E11.9      Lancets Ultra Thin 44O MISC 1 applicator by Does not apply route 2 times daily 100 each 5    UNABLE TO FIND Please dispense what insurance will cover, blood glucose test strips, DX=E11.9, testing once daily and prn 60 strip 2    loratadine (CLARITIN) 10 MG tablet Take 10 mg by mouth daily as needed  (Patient not taking: Reported on 2/1/2022)       No current facility-administered medications for this visit. Past Medical History:   Diagnosis Date    Abnormal mammogram 8/14/2017    0.4 cm hypoechoic mass(most likely a small intramammary lymph node or less likely a complicated cyst.  Follow-up in 6 months recommended with ultrasound    Attempted suicide ([address] 2012    hanged herself.     Brain neoplasm ([address] grade II Oligo resected , Dr. Crystal Swann resected tumor. Dr. Tra Rivero oncologist seeing pt also. No chemo. Dr. Crystal Swann following pt q year. Tumor recurred, right frontal craniotomy and tumor resection  Dr. Abhilsah Rivera. Had radiation to the brain     Chronic obstructive pulmonary disease ([facility] 6/15/2016    PFT showed mod sees Dr Shayan STEWART    Cyst of left kidney 2014    MR I of . follow-up in 6-12 months    Cyst of pancreas 2014    Needs follow-up in one year 7/15    Depression     Hearing loss     Hearing loss     Herniated disc     L4 &L5    Hyperlipidemia     Kidney stones     Liver dysfunction 2014    In  hepatitis ABC negative. Immune studies negative, iron studies normal.  MRI of liver mild fatty liver.  F/u LFTS normal.    Osteopenia 14    Sarcoidosis     Skin cancer     nose     Past Surgical History:   Procedure Laterality Date    BREAST BIOPSY Right 3/5/2020    BREAST LESION BIOPSY EXCISION NEEDLE LOCALIZATION MASS RIGHT performed by Marquetta Kayser, MD at [address] Right 2020     right breast mass removal by Dr. Kim Cleaning in [place] [address]  2006    COLONOSCOPY  2008    f/u 5 years- Dr Farida Bell      resection of brain tumor Dr. Avis Pink LITHOTRIPSY     [facility]      Dr Kalie Lopez History     Tobacco Use    Smoking status: Former Smoker     Packs/day: 0.75     Years: 20.00     Pack years: 15.00     Quit date: 2003     Years since quittin.4    Smokeless tobacco: Never Used   Substance Use Topics    Alcohol use: No     Alcohol/week: 0.0 standard drinks       LAB REVIEW:  CBC:   Lab Results   Component Value Date    WBC 6.2 10/29/2021    HGB 14.5 10/29/2021    HCT 45.5 10/29/2021     10/29/2021     Lipids:   Lab Results   Component Value Date    HDL 57 10/29/2021    LDLCALC 73

## 2022-05-04 ENCOUNTER — OFFICE VISIT (OUTPATIENT)
Dept: INTERNAL MEDICINE CLINIC | Age: 72
End: 2022-05-04
Payer: MEDICARE

## 2022-05-04 VITALS
TEMPERATURE: 96.8 F | RESPIRATION RATE: 16 BRPM | OXYGEN SATURATION: 98 % | HEART RATE: 88 BPM | BODY MASS INDEX: 40.74 KG/M2 | WEIGHT: 215.6 LBS | DIASTOLIC BLOOD PRESSURE: 70 MMHG | SYSTOLIC BLOOD PRESSURE: 132 MMHG

## 2022-05-04 DIAGNOSIS — E11.9 CONTROLLED TYPE 2 DIABETES MELLITUS WITHOUT COMPLICATION, WITHOUT LONG-TERM CURRENT USE OF INSULIN (HCC): ICD-10-CM

## 2022-05-04 DIAGNOSIS — D86.9 SARCOIDOSIS: ICD-10-CM

## 2022-05-04 DIAGNOSIS — E78.2 MIXED HYPERLIPIDEMIA: ICD-10-CM

## 2022-05-04 DIAGNOSIS — J44.9 CHRONIC OBSTRUCTIVE PULMONARY DISEASE, UNSPECIFIED COPD TYPE (HCC): ICD-10-CM

## 2022-05-04 DIAGNOSIS — K76.89 LIVER DYSFUNCTION: ICD-10-CM

## 2022-05-04 DIAGNOSIS — F32.A DEPRESSION, UNSPECIFIED DEPRESSION TYPE: ICD-10-CM

## 2022-05-04 DIAGNOSIS — E11.9 DIABETES MELLITUS, NEW ONSET (HCC): Primary | ICD-10-CM

## 2022-05-04 DIAGNOSIS — H91.93 BILATERAL HEARING LOSS, UNSPECIFIED HEARING LOSS TYPE: ICD-10-CM

## 2022-05-04 DIAGNOSIS — C71.9 OLIGOASTROCYTOMA, WHO GRADE 2 (HCC): ICD-10-CM

## 2022-05-04 DIAGNOSIS — E55.9 VITAMIN D DEFICIENCY: ICD-10-CM

## 2022-05-04 LAB
CREATININE URINE POCT: ABNORMAL
HBA1C MFR BLD: 6.3 %
MICROALBUMIN/CREAT 24H UR: ABNORMAL MG/G{CREAT}
MICROALBUMIN/CREAT UR-RTO: ABNORMAL

## 2022-05-04 PROCEDURE — 3023F SPIROM DOC REV: CPT | Performed by: INTERNAL MEDICINE

## 2022-05-04 PROCEDURE — G8428 CUR MEDS NOT DOCUMENT: HCPCS | Performed by: INTERNAL MEDICINE

## 2022-05-04 PROCEDURE — 82044 UR ALBUMIN SEMIQUANTITATIVE: CPT | Performed by: INTERNAL MEDICINE

## 2022-05-04 PROCEDURE — 3044F HG A1C LEVEL LT 7.0%: CPT | Performed by: INTERNAL MEDICINE

## 2022-05-04 PROCEDURE — G8399 PT W/DXA RESULTS DOCUMENT: HCPCS | Performed by: INTERNAL MEDICINE

## 2022-05-04 PROCEDURE — 1123F ACP DISCUSS/DSCN MKR DOCD: CPT | Performed by: INTERNAL MEDICINE

## 2022-05-04 PROCEDURE — 3017F COLORECTAL CA SCREEN DOC REV: CPT | Performed by: INTERNAL MEDICINE

## 2022-05-04 PROCEDURE — 2022F DILAT RTA XM EVC RTNOPTHY: CPT | Performed by: INTERNAL MEDICINE

## 2022-05-04 PROCEDURE — 1090F PRES/ABSN URINE INCON ASSESS: CPT | Performed by: INTERNAL MEDICINE

## 2022-05-04 PROCEDURE — G8417 CALC BMI ABV UP PARAM F/U: HCPCS | Performed by: INTERNAL MEDICINE

## 2022-05-04 PROCEDURE — 83036 HEMOGLOBIN GLYCOSYLATED A1C: CPT | Performed by: INTERNAL MEDICINE

## 2022-05-04 PROCEDURE — 4040F PNEUMOC VAC/ADMIN/RCVD: CPT | Performed by: INTERNAL MEDICINE

## 2022-05-04 PROCEDURE — 99214 OFFICE O/P EST MOD 30 MIN: CPT | Performed by: INTERNAL MEDICINE

## 2022-05-04 PROCEDURE — 1036F TOBACCO NON-USER: CPT | Performed by: INTERNAL MEDICINE

## 2022-05-04 RX ORDER — ATORVASTATIN CALCIUM 40 MG/1
40 TABLET, FILM COATED ORAL DAILY
Qty: 90 TABLET | Refills: 1 | Status: SHIPPED | OUTPATIENT
Start: 2022-05-04

## 2022-05-04 RX ORDER — ATORVASTATIN CALCIUM 40 MG/1
40 TABLET, FILM COATED ORAL DAILY
Qty: 90 TABLET | Refills: 1 | Status: CANCELLED | OUTPATIENT
Start: 2022-05-04

## 2022-05-04 NOTE — PROGRESS NOTES
Hermes Manriquez  Patient's  is 1950  Seen in office on 2022      SUBJECTIVE:  Tanja Fernandes christian 67 y. o.year old female presents today   Chief Complaint   Patient presents with    Follow-up     DM A1c=6.3    Medication Refill     Patient is here for follow-up of diabetes, hyperlipidemia, COPD, depression  Patient has diabetes and she is doing very well. Hemoglobin A1c is 6.3. No hypoglycemic symptoms. Taking metformin on a regular basis. No diarrhea or abdominal symptoms. Patient has hyperlipidemia and is on atorvastatin. Denies any myalgias  She has COPD and has seen pulmonologist.  She is taking Anoro and is doing well  She has depression and goes to [facility]  . She had a brain tumor and follows with neurosurgeon in [place]. Patient denies any chest pain. No shortness of breath. No cough or sputum production. No abdominal pain. No nausea, vomiting or diarrhea  Taking medications regularly. No side effects noted. Review of Systems   Constitutional: Negative. HENT: Positive for hearing loss. Eyes: Negative. Respiratory: Negative. Cardiovascular: Negative. Gastrointestinal: Negative. Endocrine: Negative. Genitourinary: Negative. Musculoskeletal: Negative. Allergic/Immunologic: Negative. Neurological: Negative. Hematological: Negative. Psychiatric/Behavioral: Negative. Review of system normal except as in HPI  OBJECTIVE: /70   Pulse 88   Temp 96.8 °F (36 °C) (Temporal)   Resp 16   Wt 215 lb 9.6 oz (97.8 kg)   SpO2 98%   BMI 40.74 kg/m²     Wt Readings from Last 3 Encounters:   22 215 lb 9.6 oz (97.8 kg)   22 216 lb (98 kg)   22 213 lb 12.8 oz (97 kg)      . Patient was seen taking COVID-19 precautions. Face mask, gloves were used. Patient also wore facemask. GENERAL: - Alert, oriented, pleasant, in no apparent distress. HEENT: - Conjunctiva pink, no scleral icterus. ENT clear. NECK: -Supple.   No jugular venous distention noted. No masses felt,  CARDIOVASCULAR: - Normal S1 and S2    PULMONARY: - No respiratory distress. No wheezes or rales. ABDOMEN: - Soft and non-tender,no masses  ororganomegaly. EXTREMITIES: - No cyanosis, clubbing, or significant edema. SKIN: Skin is warm and dry. NEUROLOGICAL: - Cranial nerves II through XII are grossly intact. IMPRESSION:    Encounter Diagnoses   Name Primary?  Diabetes mellitus, new onset ([facility] Yes    Mixed hyperlipidemia     Sarcoidosis     Vitamin D deficiency     Depression, unspecified depression type     Bilateral hearing loss, unspecified hearing loss type     Oligoastrocytoma, WHO grade 2 (HCC)     Chronic obstructive pulmonary disease, unspecified COPD type ([facility]     Controlled type 2 diabetes mellitus without complication, without long-term current use of insulin ([facility])        ASSESSMENT/PLAN:    Controlled type 2 diabetes mellitus without complication, without long-term current use of insulin ([facility])    Hemoglobin A1c is 6.3  Metformin 500 mg daily    Mixed hyperlipidemia    Patient is taking atorvastatin    Sarcoidosis    Seen  Dr Aidee Palmer. He saw patient and he started her on inhaler Anora and albuterol prn    Depression   . Patient goes to [facility] and the depression in control per patient. She is taking escitalopram    Hearing loss   . Patient has chronically bilateral hearing loss    Oligoastrocytoma, WHO grade 2 ([facility]    this is stable. Sees Dr. Denise Moe. Last seen in December 2021      Chronic obstructive pulmonary disease ([facility]    PFT showed mod sees Dr Harjit Sarmiento RW  Used Anora and ventolin prn. Return to office in 3 months  Orders Placed This Encounter   Procedures    Comprehensive Metabolic Panel    Lipid, Fasting    Vitamin D 25 Hydroxy    POCT glycosylated hemoglobin (Hb A1C)    POCT microalbumin         Mediations reviewed with the patient. Continue current medications. Appropriate prescriptions are addressed. After visit summeryprovided. Follow up as directed sooner if needed. Questions answered and patient verbalizes understanding. Call for any problems, questions, or concerns. Allergies   Allergen Reactions    Minocycline     Tetracyclines & Related     Demeclocycline Rash    Tetracycline Rash     Current Outpatient Medications   Medication Sig Dispense Refill    umeclidinium-vilanterol (ANORO ELLIPTA) 62.5-25 MCG/INH AEPB inhaler 1 puff daily 1 each 5    metFORMIN (GLUCOPHAGE-XR) 500 MG extended release tablet Take 1 tablet by mouth daily (with breakfast) 90 tablet 1    Calcium Carbonate (CALCIUM 600 PO) Take 600 mg by mouth 2 times daily      atorvastatin (LIPITOR) 40 MG tablet TAKE 1 TABLET BY MOUTH DAILY 90 tablet 1    vitamin D3 (CHOLECALCIFEROL) 25 MCG (1000 UT) TABS tablet Take 2 tablets by mouth daily 30 tablet 5    rOPINIRole (REQUIP) 0.5 MG tablet Take 0.5 mg by mouth daily       escitalopram (LEXAPRO) 10 MG tablet Take 1 tablet by mouth daily (Patient taking differently: Take 10 mg by mouth daily Take 1 1/2 tablet daily) 90 tablet 1    vitamin E 400 UNIT capsule 400 Units (Patient not taking: Reported on 5/4/2022)      blood glucose test strips (TRUE METRIX BLOOD GLUCOSE TEST) strip Testing once daily, DX=E11.9 50 strip 5    Lancet Device MISC 1 Device by Does not apply route 2 times daily DX: E11.9      Lancets Ultra Thin 42E MISC 1 applicator by Does not apply route 2 times daily 100 each 5    UNABLE TO FIND Please dispense what insurance will cover, blood glucose test strips, DX=E11.9, testing once daily and prn 60 strip 2     No current facility-administered medications for this visit. Past Medical History:   Diagnosis Date    Abnormal mammogram 8/14/2017    0.4 cm hypoechoic mass(most likely a small intramammary lymph node or less likely a complicated cyst.  Follow-up in 6 months recommended with ultrasound    Attempted suicide ([address] 2012    hanged herself.     Brain neoplasm ([address]     grade II Oligo resected , Dr. Tiff Luna resected tumor. Dr. Matt Donaldson oncologist seeing pt also. No chemo. Dr. Tiff Luna following pt q year. Tumor recurred, right frontal craniotomy and tumor resection  Dr. Carmen Courtney. Had radiation to the brain     Chronic obstructive pulmonary disease ([facility] 6/15/2016    PFT showed mod sees Dr Ray Anaya RW    Cyst of left kidney 2014    MR I of . follow-up in 6-12 months    Cyst of pancreas 2014    Needs follow-up in one year 7/15    Depression     Hearing loss     Hearing loss     Herniated disc     L4 &L5    Hyperlipidemia     Kidney stones     Liver dysfunction 2014    In  hepatitis ABC negative. Immune studies negative, iron studies normal.  MRI of liver mild fatty liver.  F/u LFTS normal.    Osteopenia 14    Sarcoidosis     Skin cancer     nose     Past Surgical History:   Procedure Laterality Date    BREAST BIOPSY Right 2020    BREAST LESION BIOPSY EXCISION NEEDLE LOCALIZATION MASS RIGHT performed by Massimo Gomez MD at [address] Right 2020     right breast mass removal by Dr. Gio Reyes in [address] Right 2022    CATARACT REMOVAL Right 2022    CHOLECYSTECTOMY  2006    COLONOSCOPY  2008    f/u 5 years- Dr Deirdre Griggs  2013    resection of brain tumor Dr. Sal Ansari LITHOTRIPSY     [facility]      Dr Hua Tucker History     Tobacco Use    Smoking status: Former Smoker     Packs/day: 0.75     Years: 20.00     Pack years: 15.00     Quit date: 2003     Years since quittin.7    Smokeless tobacco: Never Used   Substance Use Topics    Alcohol use: No     Alcohol/week: 0.0 standard drinks       LAB REVIEW:  CBC:   Lab Results   Component Value Date    WBC 6.2 10/29/2021    HGB 14.5 10/29/2021    HCT 45.5 10/29/2021     10/29/2021     Lipids:   Lab Results Component Value Date    HDL 57 10/29/2021    LDLCALC 73 10/09/2019    LDLDIRECT 108 (H) 10/29/2021    TRIGLYCFAST 154 (H) 10/29/2021    CHOLFAST 187 10/29/2021     Renal:   Lab Results   Component Value Date    BUN 15 10/29/2021    CREATININE 0.8 10/29/2021     10/29/2021    K 4.5 10/29/2021    ALT 35 10/29/2021    AST 18 10/29/2021    GLUCOSE 98 07/20/2020    GLUF 126 10/29/2021     PT/INR:   Lab Results   Component Value Date    INR 0.91 10/14/2012     A1C:   Lab Results   Component Value Date    LABA1C 6.3 05/04/2022           Ramona Armstrong MD, 5/4/2022 , 11:09 AM

## 2022-05-06 ENCOUNTER — HOSPITAL ENCOUNTER (OUTPATIENT)
Age: 72
Discharge: HOME | End: 2022-05-06
Payer: MEDICARE

## 2022-05-06 LAB
ALBUMIN SERPL-MCNC: 4.3 GM/DL (ref 3.4–5)
ALP BLD-CCNC: 118 IU/L (ref 40–129)
ALT SERPL-CCNC: 23 U/L (ref 10–40)
ANION GAP SERPL CALCULATED.3IONS-SCNC: 14 MMOL/L (ref 4–16)
AST SERPL-CCNC: 18 IU/L (ref 15–37)
BILIRUB SERPL-MCNC: 0.4 MG/DL (ref 0–1)
BUN BLDV-MCNC: 20 MG/DL (ref 6–23)
CALCIUM SERPL-MCNC: 9.4 MG/DL (ref 8.3–10.6)
CHLORIDE BLD-SCNC: 104 MMOL/L (ref 99–110)
CHOLESTEROL, FASTING: 191 MG/DL
CO2: 22 MMOL/L (ref 21–32)
CREAT SERPL-MCNC: 0.9 MG/DL (ref 0.6–1.1)
GFR AFRICAN AMERICAN: >60 ML/MIN/1.73M2
GFR NON-AFRICAN AMERICAN: >60 ML/MIN/1.73M2
GLUCOSE BLD-MCNC: 139 MG/DL (ref 70–99)
HDLC SERPL-MCNC: 55 MG/DL
LDL CHOLESTEROL CALCULATED: 103 MG/DL
POTASSIUM SERPL-SCNC: 4.7 MMOL/L (ref 3.5–5.1)
SODIUM BLD-SCNC: 140 MMOL/L (ref 135–145)
TOTAL PROTEIN: 7.5 GM/DL (ref 6.4–8.2)
TRIGLYCERIDE, FASTING: 163 MG/DL

## 2022-05-06 PROCEDURE — 80053 COMPREHEN METABOLIC PANEL: CPT

## 2022-05-06 PROCEDURE — 80061 LIPID PANEL: CPT

## 2022-05-06 PROCEDURE — 36415 COLL VENOUS BLD VENIPUNCTURE: CPT

## 2022-05-06 PROCEDURE — 82306 VITAMIN D 25 HYDROXY: CPT

## 2022-05-10 LAB — VITAMIN D 25-HYDROXY: 24.85 NG/ML

## 2022-05-12 ENCOUNTER — HOSPITAL ENCOUNTER (OUTPATIENT)
Dept: GENERAL RADIOLOGY | Age: 72
Discharge: HOME | End: 2022-05-12
Payer: MEDICARE

## 2022-05-12 ENCOUNTER — HOSPITAL ENCOUNTER (OUTPATIENT)
Age: 72
Discharge: HOME | End: 2022-05-12
Payer: MEDICARE

## 2022-05-12 DIAGNOSIS — J43.9 PULMONARY EMPHYSEMA, UNSPECIFIED EMPHYSEMA TYPE (HCC): ICD-10-CM

## 2022-05-12 PROCEDURE — 71046 X-RAY EXAM CHEST 2 VIEWS: CPT

## 2022-08-04 ENCOUNTER — OFFICE VISIT (OUTPATIENT)
Dept: INTERNAL MEDICINE CLINIC | Age: 72
End: 2022-08-04
Payer: MEDICARE

## 2022-08-04 VITALS
WEIGHT: 216.6 LBS | SYSTOLIC BLOOD PRESSURE: 136 MMHG | HEART RATE: 80 BPM | DIASTOLIC BLOOD PRESSURE: 72 MMHG | BODY MASS INDEX: 40.93 KG/M2 | RESPIRATION RATE: 20 BRPM | TEMPERATURE: 97 F | OXYGEN SATURATION: 95 %

## 2022-08-04 DIAGNOSIS — C71.9 OLIGOASTROCYTOMA, WHO GRADE 2 (HCC): ICD-10-CM

## 2022-08-04 DIAGNOSIS — E11.9 CONTROLLED TYPE 2 DIABETES MELLITUS WITHOUT COMPLICATION, WITHOUT LONG-TERM CURRENT USE OF INSULIN (HCC): Primary | ICD-10-CM

## 2022-08-04 DIAGNOSIS — E78.2 MIXED HYPERLIPIDEMIA: ICD-10-CM

## 2022-08-04 DIAGNOSIS — Z12.31 SCREENING MAMMOGRAM FOR BREAST CANCER: ICD-10-CM

## 2022-08-04 DIAGNOSIS — H91.93 BILATERAL HEARING LOSS, UNSPECIFIED HEARING LOSS TYPE: ICD-10-CM

## 2022-08-04 DIAGNOSIS — D86.9 SARCOIDOSIS: ICD-10-CM

## 2022-08-04 DIAGNOSIS — F32.A DEPRESSION, UNSPECIFIED DEPRESSION TYPE: ICD-10-CM

## 2022-08-04 DIAGNOSIS — Z91.81 AT HIGH RISK FOR FALLS: ICD-10-CM

## 2022-08-04 DIAGNOSIS — J44.9 CHRONIC OBSTRUCTIVE PULMONARY DISEASE, UNSPECIFIED COPD TYPE (HCC): ICD-10-CM

## 2022-08-04 PROCEDURE — 3017F COLORECTAL CA SCREEN DOC REV: CPT | Performed by: INTERNAL MEDICINE

## 2022-08-04 PROCEDURE — 1090F PRES/ABSN URINE INCON ASSESS: CPT | Performed by: INTERNAL MEDICINE

## 2022-08-04 PROCEDURE — 2022F DILAT RTA XM EVC RTNOPTHY: CPT | Performed by: INTERNAL MEDICINE

## 2022-08-04 PROCEDURE — 1123F ACP DISCUSS/DSCN MKR DOCD: CPT | Performed by: INTERNAL MEDICINE

## 2022-08-04 PROCEDURE — 3044F HG A1C LEVEL LT 7.0%: CPT | Performed by: INTERNAL MEDICINE

## 2022-08-04 PROCEDURE — 3023F SPIROM DOC REV: CPT | Performed by: INTERNAL MEDICINE

## 2022-08-04 PROCEDURE — G8427 DOCREV CUR MEDS BY ELIG CLIN: HCPCS | Performed by: INTERNAL MEDICINE

## 2022-08-04 PROCEDURE — 99214 OFFICE O/P EST MOD 30 MIN: CPT | Performed by: INTERNAL MEDICINE

## 2022-08-04 PROCEDURE — G8417 CALC BMI ABV UP PARAM F/U: HCPCS | Performed by: INTERNAL MEDICINE

## 2022-08-04 PROCEDURE — G8399 PT W/DXA RESULTS DOCUMENT: HCPCS | Performed by: INTERNAL MEDICINE

## 2022-08-04 PROCEDURE — 1036F TOBACCO NON-USER: CPT | Performed by: INTERNAL MEDICINE

## 2022-08-04 RX ORDER — METFORMIN HYDROCHLORIDE 500 MG/1
500 TABLET, EXTENDED RELEASE ORAL
Qty: 90 TABLET | Refills: 1 | Status: SHIPPED | OUTPATIENT
Start: 2022-08-04

## 2022-08-04 RX ORDER — AMOXICILLIN 500 MG/1
500 CAPSULE ORAL 3 TIMES DAILY
Qty: 30 CAPSULE | Refills: 0 | Status: SHIPPED | OUTPATIENT
Start: 2022-08-04 | End: 2022-08-14

## 2022-08-04 RX ORDER — ESCITALOPRAM OXALATE 20 MG/1
20 TABLET ORAL DAILY
COMMUNITY
Start: 2022-07-22

## 2022-08-04 ASSESSMENT — PATIENT HEALTH QUESTIONNAIRE - PHQ9
SUM OF ALL RESPONSES TO PHQ QUESTIONS 1-9: 1
2. FEELING DOWN, DEPRESSED OR HOPELESS: 1
SUM OF ALL RESPONSES TO PHQ QUESTIONS 1-9: 1
SUM OF ALL RESPONSES TO PHQ9 QUESTIONS 1 & 2: 1
SUM OF ALL RESPONSES TO PHQ QUESTIONS 1-9: 1
SUM OF ALL RESPONSES TO PHQ QUESTIONS 1-9: 1
1. LITTLE INTEREST OR PLEASURE IN DOING THINGS: 0

## 2022-08-04 NOTE — PROGRESS NOTES
Gisela Fitzgerald  Patient's  is 1950  Seen in office on 2022      SUBJECTIVE:  Aurea Shelby christian 67 y. o.year old female presents today   Chief Complaint   Patient presents with    Diabetes     Patient is here for follow-up of diabetes, hyperlipidemia, sarcoidosis, depression and brain tumors. Patient states she is feeling well and has no complaints. She has diabetes mellitus denies any dizziness no syncope or near syncope. No myalgias. No hypoglycemia. She is taking medications. Patient has hyperlipidemia and is on medication. The patient is in control and is on Lexapro. Patient has COPD and is on Anoro that helps  Patient sees neurosurgeon for the present treatments and they have been stable. Taking medications regularly. No side effects noted. Review of Systems  Review of system normal except as in HPI  OBJECTIVE: /72   Pulse 80   Temp 97 °F (36.1 °C) (Temporal)   Resp 20   Wt 216 lb 9.6 oz (98.2 kg)   SpO2 95%   BMI 40.93 kg/m²     Wt Readings from Last 3 Encounters:   22 216 lb 9.6 oz (98.2 kg)   22 216 lb (98 kg)   22 215 lb 9.6 oz (97.8 kg)      Patient was seen taking COVID-19 precautions. Face mask, gloves were used. Patient also wore facemask. GENERAL: - Alert, oriented, pleasant, in no apparent distress. HEENT: - Conjunctiva pink, no scleral icterus. ENT clear. NECK: -Supple. No jugular venous distention noted. No masses felt,  CARDIOVASCULAR: - Normal S1 and S2    PULMONARY: - No respiratory distress. No wheezes or rales. ABDOMEN: - Soft and non-tender,no masses  ororganomegaly. EXTREMITIES: - No cyanosis, clubbing, or significant edema. SKIN: Skin is warm and dry. NEUROLOGICAL: - Cranial nerves II through XII are grossly intact. MRI brain 22  IMPRESSION:     Stable postsurgical postradiation changes on the right.  There is stable enhancement involving the right basal ganglia, capsular region, and pulvinar thalamus with stable postsurgical changes of right frontal lobe. No new or increasing signal abnormality or enhancement or mass effect to suggest residual or recurrent tumor is identified at this point. There is extensive paranasal sinus disease noted. There is no acute infarct or hemorrhage is evident. DICTATED BY: Hayes Solomon D.O. Workstation OI:[id number]    Narrative      IMPRESSION:    Encounter Diagnoses   Name Primary? Controlled type 2 diabetes mellitus without complication, without long-term current use of insulin (HCC) Yes    Mixed hyperlipidemia     Sarcoidosis     Depression, unspecified depression type     Bilateral hearing loss, unspecified hearing loss type     Oligoastrocytoma, WHO grade 2 (HCC)     Chronic obstructive pulmonary disease, unspecified COPD type ([facility]     Screening mammogram for breast cancer     At high risk for falls        ASSESSMENT/PLAN:    1. Controlled type 2 diabetes mellitus without complication, without long-term current use of insulin (HCC)  Last hemoglobin A1c 6.3. Continue metformin  mg daily follow diet    2. Mixed hyperlipidemia  Patient is on atorvastatin. Continue the same    3. Sarcoidosis  Patient sees pulmonologist and is on Anoro. 4. Depression, unspecified depression type  Depression in control. Patient was going to [facility]    5. Bilateral hearing loss, unspecified hearing loss type  Chronic. 6. Oligoastrocytoma, WHO grade 2 [facility])  Patient sees neurosurgeon. MRI was stable As given above. 7. Chronic obstructive pulmonary disease, unspecified COPD type ([facility]  Patient is using inhaler Anoro. And albuterol inhaler as needed. Orders Placed This Encounter   Procedures    CHELSI DIGITAL SCREEN W OR WO CAD BILATERAL    Lipid, Fasting    Comprehensive Metabolic Panel    Hemoglobin A1C       Mediations reviewed with the patient. Continue current medications. Appropriate prescriptions are addressed. After visit summeryprovided.   Follow up as directed sooner if needed. Questions answered and patient verbalizes understanding. Call for any problems, questions, or concerns. Allergies   Allergen Reactions    Minocycline     Tetracyclines & Related     Demeclocycline Rash    Tetracycline Rash     Current Outpatient Medications   Medication Sig Dispense Refill    escitalopram (LEXAPRO) 20 MG tablet Take 20 mg by mouth in the morning. Multiple Vitamin (MULTIVITAMIN ADULT PO) Take 1 tablet by mouth daily (with breakfast)      traZODone (DESYREL) 50 MG tablet Take 0.5 tablets by mouth nightly 30 tablet 1    atorvastatin (LIPITOR) 40 MG tablet Take 1 tablet by mouth daily 90 tablet 1    umeclidinium-vilanterol (ANORO ELLIPTA) 62.5-25 MCG/INH AEPB inhaler 1 puff daily 1 each 5    metFORMIN (GLUCOPHAGE-XR) 500 MG extended release tablet Take 1 tablet by mouth daily (with breakfast) 90 tablet 1    Calcium Carbonate (CALCIUM 600 PO) Take 600 mg by mouth 2 times daily      vitamin D3 (CHOLECALCIFEROL) 25 MCG (1000 UT) TABS tablet Take 2 tablets by mouth daily 30 tablet 5    rOPINIRole (REQUIP) 0.5 MG tablet Take 0.5 mg by mouth daily       blood glucose test strips (TRUE METRIX BLOOD GLUCOSE TEST) strip Testing once daily, DX=E11.9 50 strip 5    Lancet Device MISC 1 Device by Does not apply route 2 times daily DX: E11.9      Lancets Ultra Thin 78R MISC 1 applicator by Does not apply route 2 times daily 100 each 5    UNABLE TO FIND Please dispense what insurance will cover, blood glucose test strips, DX=E11.9, testing once daily and prn 60 strip 2     No current facility-administered medications for this visit. Past Medical History:   Diagnosis Date    Abnormal mammogram 08/14/2017    0.4 cm hypoechoic mass(most likely a small intramammary lymph node or less likely a complicated cyst.  Follow-up in 6 months recommended with ultrasound    Attempted suicide ([address] 2012    hanged herself. [facility] neoplasm ([facility]     grade II Oligo resected 4/13, Dr. Aaliyah Grimes resected tumor.   [facility] oncologist seeing pt also. No chemo. Dr. Claudia Shelby following pt q year. Tumor recurred, right frontal craniotomy and tumor resection  Dr. Orion Javier. Had radiation to the brain     Chronic obstructive pulmonary disease ([address] 06/15/2016    PFT showed mod sees Dr Marquez Briscoe 2022    positive home test    Cyst of left kidney 2014    MR I of . follow-up in 6-12 months    Cyst of pancreas 2014    Needs follow-up in one year 7/15    Depression     Hearing loss     Hearing loss     Herniated disc     L4 &L5    Hyperlipidemia     Kidney stones     Liver dysfunction 2014    In  hepatitis ABC negative. Immune studies negative, iron studies normal.  MRI of liver mild fatty liver.  F/u LFTS normal.    Osteopenia 2014    Sarcoidosis     Skin cancer     nose     Past Surgical History:   Procedure Laterality Date    BREAST BIOPSY Right 2020    BREAST LESION BIOPSY EXCISION NEEDLE LOCALIZATION MASS RIGHT performed by Alix Hernandes MD [facility] [address] Right 2020     [facility]    CATARACT REMOVAL Right 2022    CHOLECYSTECTOMY  2006    COLONOSCOPY  2008    f/u 5 years- Dr Derick Davis  2013    resection of brain tumor Dr. Cheryl Christian ([facility])      LITHOTRIPSY  2005    MAMMO IMPLANT DIGITAL DIAG BI      Dr Ivory Carolina History     Tobacco Use    Smoking status: Former     Packs/day: 0.75     Years: 20.00     Pack years: 15.00     Types: Cigarettes     Quit date: 2003     Years since quittin.9    Smokeless tobacco: Never   Substance Use Topics    Alcohol use: No     Alcohol/week: 0.0 standard drinks       LAB REVIEW:  CBC:   Lab Results   Component Value Date/Time    WBC 6.2 10/29/2021 11:30 AM    HGB 14.5 10/29/2021 11:30 AM    HCT 45.5 10/29/2021 11:30 AM     10/29/2021 11:30 AM     Lipids:   Lab Results   Component Value Date    HDL 55 05/06/2022    LDLCALC 103 (H) 05/06/2022    LDLDIRECT 108 (H) 10/29/2021    TRIGLYCFAST 163 (H) 05/06/2022    CHOLFAST 191 05/06/2022     Renal:   Lab Results   Component Value Date/Time    BUN 20 05/06/2022 11:15 AM    CREATININE 0.9 05/06/2022 11:15 AM     05/06/2022 11:15 AM    K 4.7 05/06/2022 11:15 AM    ALT 23 05/06/2022 11:15 AM    AST 18 05/06/2022 11:15 AM    GLUCOSE 139 05/06/2022 11:15 AM    GLUF 126 10/29/2021 11:30 AM     PT/INR:   Lab Results   Component Value Date/Time    INR 0.91 10/14/2012 12:00 PM     A1C:   Lab Results   Component Value Date    LABA1C 6.3 05/04/2022           Rodney Moreno MD, 8/4/2022 , 10:50 AM     On the basis of positive falls risk screening, assessment and plan is as follows: home safety tips provided.

## 2022-09-06 ENCOUNTER — TELEPHONE (OUTPATIENT)
Dept: INTERNAL MEDICINE CLINIC | Age: 72
End: 2022-09-06

## 2022-09-08 ENCOUNTER — HOSPITAL ENCOUNTER (OUTPATIENT)
Dept: MAMMOGRAPHY | Age: 72
Discharge: HOME | End: 2022-09-08
Payer: MEDICARE

## 2022-09-08 DIAGNOSIS — Z12.31 SCREENING MAMMOGRAM FOR BREAST CANCER: ICD-10-CM

## 2022-09-08 PROCEDURE — 77063 BREAST TOMOSYNTHESIS BI: CPT

## 2022-09-13 DIAGNOSIS — R92.8 ABNORMAL MAMMOGRAM: Primary | ICD-10-CM

## 2022-09-22 ENCOUNTER — HOSPITAL ENCOUNTER (OUTPATIENT)
Dept: ULTRASOUND IMAGING | Age: 72
Discharge: HOME | End: 2022-09-22
Payer: MEDICARE

## 2022-09-22 DIAGNOSIS — R92.8 ABNORMAL MAMMOGRAM: ICD-10-CM

## 2022-09-22 PROCEDURE — 76642 ULTRASOUND BREAST LIMITED: CPT

## 2022-09-30 ENCOUNTER — COMMUNITY OUTREACH (OUTPATIENT)
Dept: INTERNAL MEDICINE CLINIC | Age: 72
End: 2022-09-30

## 2022-10-13 ENCOUNTER — HOSPITAL ENCOUNTER (OUTPATIENT)
Dept: ULTRASOUND IMAGING | Age: 72
Discharge: HOME | End: 2022-10-13
Payer: MEDICARE

## 2022-10-13 ENCOUNTER — HOSPITAL ENCOUNTER (OUTPATIENT)
Dept: MAMMOGRAPHY | Age: 72
Discharge: HOME | End: 2022-10-13
Payer: MEDICARE

## 2022-10-13 DIAGNOSIS — N63.10 MASS OF RIGHT BREAST, UNSPECIFIED QUADRANT: ICD-10-CM

## 2022-10-13 DIAGNOSIS — Z98.890 S/P RIGHT BREAST BIOPSY: ICD-10-CM

## 2022-10-13 PROCEDURE — 88342 IMHCHEM/IMCYTCHM 1ST ANTB: CPT | Performed by: PATHOLOGY

## 2022-10-13 PROCEDURE — 88305 TISSUE EXAM BY PATHOLOGIST: CPT | Performed by: PATHOLOGY

## 2022-10-13 PROCEDURE — 88360 TUMOR IMMUNOHISTOCHEM/MANUAL: CPT | Performed by: PATHOLOGY

## 2022-10-13 PROCEDURE — 2720000010 US BREAST BIOPSY W LOC DEVICE 1ST LESION RIGHT

## 2022-10-13 PROCEDURE — 77065 DX MAMMO INCL CAD UNI: CPT

## 2022-10-18 ENCOUNTER — OFFICE VISIT (OUTPATIENT)
Dept: INTERNAL MEDICINE CLINIC | Age: 72
End: 2022-10-18
Payer: MEDICARE

## 2022-10-18 VITALS
SYSTOLIC BLOOD PRESSURE: 132 MMHG | HEART RATE: 77 BPM | HEIGHT: 61 IN | WEIGHT: 217.6 LBS | DIASTOLIC BLOOD PRESSURE: 76 MMHG | BODY MASS INDEX: 41.08 KG/M2 | OXYGEN SATURATION: 97 % | RESPIRATION RATE: 16 BRPM

## 2022-10-18 DIAGNOSIS — C50.011 CARCINOMA OF AREOLA OF RIGHT BREAST IN FEMALE, ESTROGEN RECEPTOR POSITIVE (HCC): ICD-10-CM

## 2022-10-18 DIAGNOSIS — Z17.0 CARCINOMA OF AREOLA OF RIGHT BREAST IN FEMALE, ESTROGEN RECEPTOR POSITIVE (HCC): ICD-10-CM

## 2022-10-18 PROCEDURE — 1090F PRES/ABSN URINE INCON ASSESS: CPT | Performed by: INTERNAL MEDICINE

## 2022-10-18 PROCEDURE — 3017F COLORECTAL CA SCREEN DOC REV: CPT | Performed by: INTERNAL MEDICINE

## 2022-10-18 PROCEDURE — G8417 CALC BMI ABV UP PARAM F/U: HCPCS | Performed by: INTERNAL MEDICINE

## 2022-10-18 PROCEDURE — 1123F ACP DISCUSS/DSCN MKR DOCD: CPT | Performed by: INTERNAL MEDICINE

## 2022-10-18 PROCEDURE — 99213 OFFICE O/P EST LOW 20 MIN: CPT | Performed by: INTERNAL MEDICINE

## 2022-10-18 PROCEDURE — 1036F TOBACCO NON-USER: CPT | Performed by: INTERNAL MEDICINE

## 2022-10-18 PROCEDURE — G8427 DOCREV CUR MEDS BY ELIG CLIN: HCPCS | Performed by: INTERNAL MEDICINE

## 2022-10-18 PROCEDURE — G8399 PT W/DXA RESULTS DOCUMENT: HCPCS | Performed by: INTERNAL MEDICINE

## 2022-10-18 PROCEDURE — G8484 FLU IMMUNIZE NO ADMIN: HCPCS | Performed by: INTERNAL MEDICINE

## 2022-10-18 SDOH — ECONOMIC STABILITY: FOOD INSECURITY: WITHIN THE PAST 12 MONTHS, YOU WORRIED THAT YOUR FOOD WOULD RUN OUT BEFORE YOU GOT MONEY TO BUY MORE.: NEVER TRUE

## 2022-10-18 SDOH — ECONOMIC STABILITY: FOOD INSECURITY: WITHIN THE PAST 12 MONTHS, THE FOOD YOU BOUGHT JUST DIDN'T LAST AND YOU DIDN'T HAVE MONEY TO GET MORE.: NEVER TRUE

## 2022-10-18 ASSESSMENT — SOCIAL DETERMINANTS OF HEALTH (SDOH): HOW HARD IS IT FOR YOU TO PAY FOR THE VERY BASICS LIKE FOOD, HOUSING, MEDICAL CARE, AND HEATING?: NOT HARD AT ALL

## 2022-10-18 NOTE — PROGRESS NOTES
Carolin Mcghee  Patient's  is 1950  Seen in office on 10/18/2022      SUBJECTIVE:  Jose A Santos christian 67 y. o.year old female presents today   Chief Complaint   Patient presents with    Other     Bx Results     Patient has a mass in the right breast and had a biopsy. Pathology showed:  BREAST INVASIVE CARCINOMA SUMMARY - CORE BIOPSY   Patient was called to come to the office for further discussion and management. Patient states she is feeling well. Has no complaints. Taking medications regularly. No side effects noted. Review of Systems    OBJECTIVE: /76   Pulse 77   Resp 16   Ht 5' 1\" (1.549 m)   Wt 217 lb 9.6 oz (98.7 kg)   SpO2 97%   BMI 41.12 kg/m²     Wt Readings from Last 3 Encounters:   10/18/22 217 lb 9.6 oz (98.7 kg)   22 216 lb (98 kg)   22 216 lb 9.6 oz (98.2 kg)      GENERAL: - Alert, oriented, pleasant, in no apparent distress. HEENT: - Conjunctiva pink, no scleral icterus. ENT clear. NECK: -Supple. No jugular venous distention noted. No masses felt, no axillary lymphadenopathy. CARDIOVASCULAR: - Normal S1 and S2    PULMONARY: - No respiratory distress. No wheezes or rales. ABDOMEN: - Soft and non-tender,no masses  ororganomegaly. EXTREMITIES: - No cyanosis, clubbing, or significant edema. SKIN: Skin is warm and dry. NEUROLOGICAL: - Cranial nerves II through XII are grossly intact. IMPRESSION:    Encounter Diagnoses   Name Primary? Carcinoma of areola of right breast in female, estrogen receptor positive ([facility]        ASSESSMENT/PLAN:  Discussed at length with the patient. Will refer patient to surgeon  Patient may need to see oncologist later. Patient agreed for referral.  Will refer to Dr. Nestor Kamara  Keep follow-up appointment. Mediations reviewed with the patient. Continue current medications. Appropriate prescriptions are addressed. After visit summeryprovided. Follow up as directed sooner if needed.   Questions answered and patient verbalizes understanding. Call for any problems, questions, or concerns. Allergies   Allergen Reactions    Minocycline     Tetracyclines & Related     Demeclocycline Rash    Tetracycline Rash     Current Outpatient Medications   Medication Sig Dispense Refill    umeclidinium-vilanterol (ANORO ELLIPTA) 62.5-25 MCG/INH AEPB inhaler 1 puff daily 1 each 5    traZODone (DESYREL) 50 MG tablet Take 0.5 tablets by mouth nightly 30 tablet 1    rOPINIRole (REQUIP) 0.5 MG tablet Take 1 tablet by mouth in the morning. 90 tablet 5    albuterol sulfate HFA (PROVENTIL;VENTOLIN;PROAIR) 108 (90 Base) MCG/ACT inhaler Inhale 2 puffs into the lungs every 4 hours as needed for Wheezing 1 each 3    escitalopram (LEXAPRO) 20 MG tablet Take 20 mg by mouth in the morning. Multiple Vitamin (MULTIVITAMIN ADULT PO) Take 1 tablet by mouth daily (with breakfast)      metFORMIN (GLUCOPHAGE-XR) 500 MG extended release tablet Take 1 tablet by mouth daily (with breakfast) 90 tablet 1    atorvastatin (LIPITOR) 40 MG tablet Take 1 tablet by mouth daily 90 tablet 1    Calcium Carbonate (CALCIUM 600 PO) Take 600 mg by mouth 2 times daily      vitamin D3 (CHOLECALCIFEROL) 25 MCG (1000 UT) TABS tablet Take 2 tablets by mouth daily 30 tablet 5    blood glucose test strips (TRUE METRIX BLOOD GLUCOSE TEST) strip Testing once daily, DX=E11.9 50 strip 5    Lancet Device MISC 1 Device by Does not apply route 2 times daily DX: E11.9      Lancets Ultra Thin 52O MISC 1 applicator by Does not apply route 2 times daily 100 each 5    UNABLE TO FIND Please dispense what insurance will cover, blood glucose test strips, DX=E11.9, testing once daily and prn 60 strip 2     No current facility-administered medications for this visit.      Past Medical History:   Diagnosis Date    Abnormal mammogram 08/14/2017    0.4 cm hypoechoic mass(most likely a small intramammary lymph node or less likely a complicated cyst.  Follow-up in 6 months recommended with ultrasound Attempted [facility] 2012    hanged herself. [facility] neoplasm ([facility]     grade II Oligo resected , Dr. Gurmeet Erickson resected tumor. Dr. Yasmin Yee oncologist seeing pt also. No chemo. Dr. Gurmeet Erickson following pt q year. Tumor recurred, right frontal craniotomy and tumor resection  Dr. Chasity Barrera. Had radiation to the brain     Chronic obstructive pulmonary disease ([facility] 06/15/2016    PFT showed mod sees Dr Lord Young 2022    positive home test    Cyst of left kidney 2014    MR I of . follow-up in 6-12 months    Cyst of pancreas 2014    Needs follow-up in one year 7/15    Depression     Hearing loss     Hearing loss     Herniated disc     L4 &L5    Hyperlipidemia     Kidney stones     Liver dysfunction 2014    In  hepatitis ABC negative. Immune studies negative, iron studies normal.  MRI of liver mild fatty liver.  F/u LFTS normal.    Osteopenia 2014    Sarcoidosis     Skin cancer     nose     Past Surgical History:   Procedure Laterality Date    BREAST BIOPSY Right 2020    BREAST LESION BIOPSY EXCISION NEEDLE LOCALIZATION MASS RIGHT performed by Rocio Celis MD at [address] Right 2020     [facility]    CATARACT REMOVAL Right 2022    CHOLECYSTECTOMY  2006    COLONOSCOPY  2008    f/u 5 years- Dr Stefania Landin  2013    resection of brain tumor Dr. Cristal Moctezuma ([address] [place] [address])      LITHOTRIPSY      MAMMO IMPLANT DIGITAL DIAG BI      Dr Momo Ryan Right 10/13/2022    US BREAST NEEDLE BIOPSY RIGHT 10/13/2022 [facility] ULTRASOUND     Social History     Tobacco Use    Smoking status: Former     Packs/day: 0.75     Years: 20.00     Pack years: 15.00     Types: Cigarettes     Quit date: 2003     Years since quittin.1    Smokeless tobacco: Never Substance Use Topics    Alcohol use: No     Alcohol/week: 0.0 standard drinks       LAB REVIEW:  CBC:   Lab Results   Component Value Date/Time    WBC 6.2 10/29/2021 11:30 AM    HGB 14.5 10/29/2021 11:30 AM    HCT 45.5 10/29/2021 11:30 AM     10/29/2021 11:30 AM     Lipids:   Lab Results   Component Value Date    HDL 55 05/06/2022    LDLCALC 103 (H) 05/06/2022    LDLDIRECT 108 (H) 10/29/2021    TRIGLYCFAST 163 (H) 05/06/2022    CHOLFAST 191 05/06/2022     Renal:   Lab Results   Component Value Date/Time    BUN 20 05/06/2022 11:15 AM    CREATININE 0.9 05/06/2022 11:15 AM     05/06/2022 11:15 AM    K 4.7 05/06/2022 11:15 AM    ALT 23 05/06/2022 11:15 AM    AST 18 05/06/2022 11:15 AM    GLUCOSE 139 05/06/2022 11:15 AM    GLUF 126 10/29/2021 11:30 AM     PT/INR:   Lab Results   Component Value Date/Time    INR 0.91 10/14/2012 12:00 PM     A1C:   Lab Results   Component Value Date    LABA1C 6.3 05/04/2022           Mac Raymond MD, 10/18/2022 , 4:16 PM

## 2022-10-24 ENCOUNTER — OFFICE VISIT (OUTPATIENT)
Dept: SURGERY | Age: 72
End: 2022-10-24
Payer: MEDICARE

## 2022-10-24 ENCOUNTER — HOSPITAL ENCOUNTER (OUTPATIENT)
Age: 72
Setting detail: SPECIMEN
Discharge: HOME | End: 2022-10-24
Payer: MEDICARE

## 2022-10-24 VITALS
SYSTOLIC BLOOD PRESSURE: 108 MMHG | BODY MASS INDEX: 40.97 KG/M2 | HEIGHT: 61 IN | DIASTOLIC BLOOD PRESSURE: 72 MMHG | HEART RATE: 94 BPM | OXYGEN SATURATION: 98 % | WEIGHT: 217 LBS

## 2022-10-24 DIAGNOSIS — Z90.722 ACQUIRED ABSENCE OF OVARIES, BILATERAL: ICD-10-CM

## 2022-10-24 DIAGNOSIS — C50.011 CARCINOMA OF AREOLA OF RIGHT BREAST IN FEMALE, ESTROGEN RECEPTOR POSITIVE (HCC): Primary | ICD-10-CM

## 2022-10-24 DIAGNOSIS — Z80.3 FAMILY HISTORY OF BREAST CANCER IN FIRST DEGREE RELATIVE: ICD-10-CM

## 2022-10-24 DIAGNOSIS — J44.9 CHRONIC OBSTRUCTIVE PULMONARY DISEASE, UNSPECIFIED COPD TYPE (HCC): ICD-10-CM

## 2022-10-24 DIAGNOSIS — Z17.0 CARCINOMA OF AREOLA OF RIGHT BREAST IN FEMALE, ESTROGEN RECEPTOR POSITIVE (HCC): Primary | ICD-10-CM

## 2022-10-24 DIAGNOSIS — L98.9 SKIN LESION OF CHEST WALL: ICD-10-CM

## 2022-10-24 PROCEDURE — G8484 FLU IMMUNIZE NO ADMIN: HCPCS | Performed by: SURGERY

## 2022-10-24 PROCEDURE — 88305 TISSUE EXAM BY PATHOLOGIST: CPT | Performed by: PATHOLOGY

## 2022-10-24 PROCEDURE — G8417 CALC BMI ABV UP PARAM F/U: HCPCS | Performed by: SURGERY

## 2022-10-24 PROCEDURE — 3023F SPIROM DOC REV: CPT | Performed by: SURGERY

## 2022-10-24 PROCEDURE — G8427 DOCREV CUR MEDS BY ELIG CLIN: HCPCS | Performed by: SURGERY

## 2022-10-24 PROCEDURE — 1090F PRES/ABSN URINE INCON ASSESS: CPT | Performed by: SURGERY

## 2022-10-24 PROCEDURE — 11104 PUNCH BX SKIN SINGLE LESION: CPT | Performed by: SURGERY

## 2022-10-24 PROCEDURE — 99205 OFFICE O/P NEW HI 60 MIN: CPT | Performed by: SURGERY

## 2022-10-24 ASSESSMENT — PATIENT HEALTH QUESTIONNAIRE - PHQ9
1. LITTLE INTEREST OR PLEASURE IN DOING THINGS: 0
SUM OF ALL RESPONSES TO PHQ QUESTIONS 1-9: 0
2. FEELING DOWN, DEPRESSED OR HOPELESS: 0
SUM OF ALL RESPONSES TO PHQ QUESTIONS 1-9: 0
SUM OF ALL RESPONSES TO PHQ9 QUESTIONS 1 & 2: 0

## 2022-10-24 NOTE — PROGRESS NOTES
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 female presenting for new diagnosis of right breast cancer. 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 She has had 2 previous lumpectomies of the right breast but pathology was benign. Of note, she has had a brain tumor and has a family history of multiple types of cancer. Breast Concerns: no  Nipple Drainage or Retraction: yes: chronically inverted nipples (since even before sugery)  Skin Changes: no  Headaches: no  Vision Changes: no (cataract surgery)  Weight Change: no  Shortness of Breath or Chest Pain: yes: COPD, stable  Bone Pain: yes: arthritis  Lymphedema: no    Workup Includes:   Mammogram/US: 9/8/22, 9/22/22  Impression   Indeterminate 11 mm hypoechoic mass within the right retroareolar region   corresponding to the mammographic area of interest.  Given increasing size   mammographically would recommend further evaluation with ultrasound-guided   biopsy.        BIRADS:   BIRADS - CATEGORY 4B   Carcinoma on bx 10/13/22  DEXA: had one in the 1990's, was told to take calcium  Sozo: (not available, seen in [place] today)    Breast History, General  Endocrine Therapy: (none previously)  Oncotype/Genetic Testing: (none previously)  Chemotherapy: \"chemo pill to enhance radiation\" for her brain tumor  Estrogen/HRT: had a hysterectomy and ovaries removed, then took estrogen for ~10 years  Radiation/Environmental Exposure (eg mantle radiation): head radiation for her brain tumor (not of the chest)  Age of menarche: 14  Age at birth of first child: 23  Family History of breast cancer: yes:   - her son had genetic testing when he was diagnosed with the same kind of brain cancer - she isn't sure of the results of his testing  - younger sister - breast cancer (was on immunosuppression for a kidney transplant and then got breast cancer, about 45years old)  - maternal aunt with breast cancer in her [de-identified]  - older sister had bone cancer (and her son/pt's nephew had bone cancer)  - father had leukemia, passed from it  - sister with lung cancer    Right Breast History  Surgery, Date:   Excision/biopsy 3/2020 ([facility]) benign  1974 lumpectomy benign  Breast cancer: yes:    Type, Stage: mucinous carcinoma (Stage pending)   Size: (11mm on imaging)   Receptor Status: ER/AK positive and HER2 negative (HER2 FISH pending)   Node Status: (negative on US)    Radiation Therapy:   Abnormal Imaging: yes: 9/8/22, 9/22/22   Biopsies: yes: 10/13/22 mucinous carcinoma  Other Breast History   Infection: no    Left Breast History  Surgery, Date:  Breast cancer: no   Abnormal Imaging: yes: remotely had lesions followed but never any biopsies   Biopsies: no  Other Breast History   Infection: no       Past Medical History:    Past Medical History:   Diagnosis Date    Abnormal mammogram 08/14/2017    0.4 cm hypoechoic mass(most likely a small intramammary lymph node or less likely a complicated cyst.  Follow-up in 6 months recommended with ultrasound    Attempted suicide ([address] 2012    hanged herself. Brain neoplasm ([address]     grade II Oligo resected 4/13, Dr. Luzma Bo resected tumor. Dr. Macario Ceron oncologist seeing pt also. No chemo. Dr. Luzma Bo following pt q year. Tumor recurred, right frontal craniotomy and tumor resection 11/14 Dr. Higuera Service.   Had radiation to the brain     Chronic obstructive pulmonary disease ([address] 06/15/2016    PFT showed mod sees Dr Bárbara Jin 07/08/2022    positive home test    Cyst of left kidney 09/11/2014    MR I of 7/14. follow-up in 6-12 months    Cyst of pancreas 09/11/2014    Needs follow-up in one year 7/15    Depression     Family history of breast cancer in first degree relative 10/26/2022    Hearing loss     Hearing loss     Herniated disc     L4 &L5    Hyperlipidemia     Kidney stones     Liver dysfunction 06/06/2014    In 7/14 hepatitis ABC negative. Immune studies negative, iron studies normal.  MRI of liver mild fatty liver. F/u LFTS normal.    Osteopenia 09/26/2014    Sarcoidosis     Skin cancer     nose       Past Surgical History:    Past Surgical History:   Procedure Laterality Date    BREAST BIOPSY Right 03/05/2020    BREAST LESION BIOPSY EXCISION NEEDLE LOCALIZATION MASS RIGHT performed by Kenny Batista MD at [facility] [address] Right 03/05/2020     right breast mass removal by Dr. Melinda Felix in [place] [address] Right 02/2022    CATARACT REMOVAL Right 04/2022    CHOLECYSTECTOMY  2006    COLONOSCOPY  2008    f/u 5 years- Dr Sofie Galeano  04/2013    resection of brain tumor Dr. José Conte ([address] [place] [address])      LITHOTRIPSY  2005    Dmitri Dono  2011    Dr Steven Fitch Right 10/13/2022    US BREAST NEEDLE BIOPSY RIGHT 10/13/2022 [facility] ULTRASOUND       Current Medications:   Current Outpatient Medications   Medication Sig Dispense Refill    umeclidinium-vilanterol (ANORO ELLIPTA) 62.5-25 MCG/INH AEPB inhaler 1 puff daily 1 each 5    traZODone (DESYREL) 50 MG tablet Take 0.5 tablets by mouth nightly 30 tablet 1    rOPINIRole (REQUIP) 0.5 MG tablet Take 1 tablet by mouth in the morning.  90 tablet 5    albuterol sulfate HFA (PROVENTIL;VENTOLIN;PROAIR) 108 (90 Base) MCG/ACT inhaler Inhale 2 puffs into the lungs every 4 hours as needed for Wheezing 1 each 3    escitalopram (LEXAPRO) 20 MG tablet Take 20 mg by mouth in the morning. Multiple Vitamin (MULTIVITAMIN ADULT PO) Take 1 tablet by mouth daily (with breakfast)      metFORMIN (GLUCOPHAGE-XR) 500 MG extended release tablet Take 1 tablet by mouth daily (with breakfast) 90 tablet 1    atorvastatin (LIPITOR) 40 MG tablet Take 1 tablet by mouth daily 90 tablet 1    Calcium Carbonate (CALCIUM 600 PO) Take 600 mg by mouth 2 times daily      vitamin D3 (CHOLECALCIFEROL) 25 MCG (1000 UT) TABS tablet Take 2 tablets by mouth daily 30 tablet 5    blood glucose test strips (TRUE METRIX BLOOD GLUCOSE TEST) strip Testing once daily, DX=E11.9 50 strip 5    Lancets Ultra Thin 08N MISC 1 applicator by Does not apply route 2 times daily 100 each 5    UNABLE TO FIND Please dispense what insurance will cover, blood glucose test strips, DX=E11.9, testing once daily and prn 60 strip 2    Lancet Device MISC 1 Device by Does not apply route 2 times daily DX: E11.9       No current facility-administered medications for this visit.        Allergies:  Minocycline, Tetracyclines & related, Demeclocycline, and Tetracycline    Social History:   Social History     Socioeconomic History    Marital status:      Spouse name: None    Number of children: None    Years of education: None    Highest education level: None   Tobacco Use    Smoking status: Former     Packs/day: 0.75     Years: 20.00     Pack years: 15.00     Types: Cigarettes     Quit date: 2003     Years since quittin.1    Smokeless tobacco: Never   Vaping Use    Vaping Use: Never used   Substance and Sexual Activity    Alcohol use: No     Alcohol/week: 0.0 standard drinks    Drug use: No    Sexual activity: Yes     Partners: Male     Social Determinants of Health     Financial Resource Strain: Low Risk     Difficulty of Paying Living Expenses: Not hard at all   Food Insecurity: No Food Insecurity    Worried About [address] in the Last Year: Never true    Ran Out of Food in the Last Year: Never true       Family History:   Family History   Problem Relation Age of Onset    Heart Attack Mother     Cancer Father         leukemia    Breast Cancer Sister 45    Breast Cancer Maternal Aunt        REVIEW OF SYSTEMS:    Review of Systems   Constitutional:  Negative for chills and fever. HENT:  Negative for congestion and sore throat. Eyes:  Negative for discharge and redness. Respiratory:  Positive for shortness of breath (COPD). Negative for chest tightness. Cardiovascular:  Negative for chest pain and palpitations. Gastrointestinal:  Negative for abdominal distention, abdominal pain, nausea and vomiting. Genitourinary:  Negative for dysuria and flank pain. Musculoskeletal:  Positive for arthralgias. Negative for myalgias. Skin:  Negative for color change and rash. Skin lesion on the chest with recent change in color   Neurological:  Negative for dizziness and numbness. Psychiatric/Behavioral:  Negative for confusion. The patient is not nervous/anxious. I have reviewed the patient's information pertinent to this visit, including medical history, family history, social history and review of systems. PHYSICAL EXAM:    Vitals:    10/24/22 1531   BP: 108/72   Site: Left Upper Arm   Position: Sitting   Cuff Size: Large Adult   Pulse: 94   SpO2: 98%   Weight: 217 lb (98.4 kg)   Height: 5' 1\" (1.549 m)     Physical Exam  Constitutional:       General: She is not in acute distress. Appearance: She is well-developed. She is not diaphoretic. HENT:      Head: Normocephalic and atraumatic. Right Ear: External ear normal.      Left Ear: External ear normal.      Mouth/Throat:      Mouth: Mucous membranes are moist.   Eyes:      General:         Right eye: No discharge. Left eye: No discharge. Neck:      Trachea: No tracheal deviation. Cardiovascular:      Rate and Rhythm: Normal rate and regular rhythm. Pulmonary:      Effort: No respiratory distress. Breath sounds: No wheezing. Chest:   Breasts: Alec Score is 5. Right: Inverted nipple (slit like central nipple, flat to areola) present. No swelling, bleeding, nipple discharge, skin change or tenderness. Left: Inverted nipple (slit like central nipple, flat to areola) present. No swelling, bleeding, mass, nipple discharge, skin change or tenderness. Abdominal:      General: There is no distension. Palpations: Abdomen is soft. Tenderness: There is no abdominal tenderness. Musculoskeletal:         General: No tenderness or deformity. Cervical back: Neck supple. Lymphadenopathy:      Upper Body:      Right upper body: No supraclavicular, axillary or pectoral adenopathy. Left upper body: No supraclavicular, axillary or pectoral adenopathy. Skin:     General: Skin is warm and dry. Findings: No rash. Neurological:      Mental Status: She is alert and oriented to person, place, and time. Psychiatric:         Behavior: Behavior normal.       DATA:    Pathology report 10/13/22  Preliminary Diagnosis   Right breast, retroareolar mass, needle core biopsy:   -     MUCINOUS CARCINOMA. Lab Results   Component Value Date    WBC 6.2 10/29/2021    HGB 14.5 10/29/2021    HCT 45.5 10/29/2021     10/29/2021     05/06/2022    K 4.7 05/06/2022     05/06/2022    CO2 22 05/06/2022    BUN 20 05/06/2022    CREATININE 0.9 05/06/2022    GLUCOSE 139 (H) 05/06/2022    CALCIUM 9.4 05/06/2022    PROT 7.5 05/06/2022    BILITOT 0.4 05/06/2022    AST 18 05/06/2022    ALT 23 05/06/2022    ALKPHOS 118 05/06/2022    INR 0.91 10/14/2012    GLUF 126 (H) 10/29/2021    LABA1C 6.3 05/04/2022       Imaging:   CHELSI POST BX CLIP PLACEMENT RIGHT    Addendum Date: 10/24/2022    ADDENDUM: Pathology results of the right retroareolar breast mass demonstrates mucinous carcinoma. Radiologic and pathologic findings are concordant. BIRADS - CATEGORY 6 Known Biopsy-Proven Cancer. Appropriate action should be taken. OVERALL ASSESSMENT - KNOWN BIOPSY PROVEN MALIGNANCY. Result Date: 10/24/2022  EXAMINATION: ULTRASOUND-GUIDED RIGHT BREAST BIOPSY ULTRASOUND-GUIDED CLIP PLACEMENT POSTPROCEDURE DIGITAL MAMMOGRAM FOR CLIP PLACEMENT 10/13/2022 HISTORY: ORDERING SYSTEM PROVIDED HISTORY: S/P right breast biopsy COMPARISON: 09/22/2022 TECHNIQUE: A timeout was performed to confirm patient identification and site of procedure. Risks, benefits, and alternatives of the procedure were discussed. Informed written consent was obtained. Transverse and longitudinal gray scale images were obtained of the right retroareolar breast mass. The biopsy site was prepped in standard fashion. 1% lidocaine was used for local anesthesia and a small skin incision was made. A 12 gauge biopsy needle was advanced under sonographic guidance via a lateral approach. 3 cores were obtained and the needle was removed. A HydroMARK T4 Butterfly biopsy clip was placed at the biopsy site under ultrasound guidance. Pressure was applied for hemostasis. The patient tolerated the procedure well with no immediate complications. POSTPROCEDURE MAMMOGRAM FOR CLIP PLACEMENT: ML and CC views of the right breast were obtained immediately following the procedure. The biopsy clip is in the correct location with respect to the targeted site. There is no evidence of biopsy clip migration from the biopsy site. Post biopsy clip placement imaging performed in a separate room. Technically successful ultrasound guided core biopsy of right retroareolar breast mass and  clip marker placement as described above. BIRADS: ZW - Pathology pending. US BREAST BIOPSY W LOC DEVICE 1ST LESION RIGHT    Addendum Date: 10/24/2022    ADDENDUM: Pathology results of the right retroareolar breast mass demonstrates mucinous carcinoma. Radiologic and pathologic findings are concordant.  BIRADS - CATEGORY 6 Known Biopsy-Proven Cancer. Appropriate action should be taken. OVERALL ASSESSMENT - KNOWN BIOPSY PROVEN MALIGNANCY. Result Date: 10/24/2022  EXAMINATION: ULTRASOUND-GUIDED RIGHT BREAST BIOPSY ULTRASOUND-GUIDED CLIP PLACEMENT POSTPROCEDURE DIGITAL MAMMOGRAM FOR CLIP PLACEMENT 10/13/2022 HISTORY: ORDERING SYSTEM PROVIDED HISTORY: S/P right breast biopsy COMPARISON: 09/22/2022 TECHNIQUE: A timeout was performed to confirm patient identification and site of procedure. Risks, benefits, and alternatives of the procedure were discussed. Informed written consent was obtained. Transverse and longitudinal gray scale images were obtained of the right retroareolar breast mass. The biopsy site was prepped in standard fashion. 1% lidocaine was used for local anesthesia and a small skin incision was made. A 12 gauge biopsy needle was advanced under sonographic guidance via a lateral approach. 3 cores were obtained and the needle was removed. A HydroMARK T4 Butterfly biopsy clip was placed at the biopsy site under ultrasound guidance. Pressure was applied for hemostasis. The patient tolerated the procedure well with no immediate complications. POSTPROCEDURE MAMMOGRAM FOR CLIP PLACEMENT: ML and CC views of the right breast were obtained immediately following the procedure. The biopsy clip is in the correct location with respect to the targeted site. There is no evidence of biopsy clip migration from the biopsy site. Post biopsy clip placement imaging performed in a separate room. Technically successful ultrasound guided core biopsy of right retroareolar breast mass and  clip marker placement as described above. BIRADS: ZW - Pathology pending. Pertinent laboratory and imaging studies were personally reviewed if available. IMPRESSION:    Sheridan Ellis is a 67 y.o. female with new diagnosis right breast mucinous carcinoma    1. Carcinoma of areola of right breast in female, estrogen receptor positive ([facility] [address]    2.  Acquired absence of ovaries, bilateral     3. Chronic obstructive pulmonary disease, unspecified COPD type ([address]    4. Skin lesion of chest wall    5. Family history of breast cancer in first degree relative      Patient Active Problem List    Diagnosis Date Noted    Family history of breast cancer in first degree relative 10/26/2022    Carcinoma of areola of right breast in female, estrogen receptor positive ([address] 10/18/2022    Controlled type 2 diabetes mellitus without complication, without long-term current use of insulin ([address] 04/27/2020    Abnormal mammogram 08/14/2017    Chronic obstructive pulmonary disease ([address] 06/15/2016    Cyst of left kidney 09/11/2014    Cyst of pancreas 09/11/2014    Liver dysfunction 06/06/2014    Mixed hyperlipidemia     Sarcoidosis     Depression     Hearing loss     Oligoastrocytoma, WHO grade 2 ([address]      PLAN:  Discussed findings and options with Gerson Davila. Mucinous carcinoma of the right breast - discussed cancer diagnosis and treatment options including breast conserving therapy, mastectomy w/wo reconstruction. Will return for further discussion after genetic testing results are back  Skin lesion of the midline chest - unclear etiology, has changed in color over the last year, will do an excisional biopsy today to rule out cancer a satellite lesion (procedure note below). Return to review pathology and for suture removal  COPD - will obtain a more recent CXR for staging  Possible history of osteopenia/osteoporosis (prior DEXA not available) - will order a DEXA scan  Estrogen receptor positive breast cancer - will consider endocrine therapy. She is postmenopausal. Check DEXA  Family history of breast cancer and multiple other cancers - referral placed to [facility] for genetic evaluation. The patient feels this would potentially change her preference for breast conservation vs mastectomy, so will await results before scheduling surgery.   Diabetes - controlled, last A1c 6.3 on 5/4/22  Follow Up: Return in about 1 week (around 10/31/2022) for test results (after testing is complete), suture/staple removal.    Orders Placed This Encounter   Procedures    DEXA BONE DENSITY AXIAL SKELETON    XR CHEST STANDARD (2 VW)    Specimen to Pathology Outpatient    Lindsay Polanco CNP, Hematology Oncology, [place]        No orders of the defined types were placed in this encounter. Electronically signed by Anali Fontaine MD, 10/26/2022, 4:53 PM       [facility] - General Surgery     PATIENT: Lam Fraire 1950   MRN: [id number]    DATE: 10/24/2022    SURGEON: Anali Fontaine MD    PROCEDURE(S) PERFORMED:   Excision of Skin lesion of chest wall      PREOPERATIVE DIAGNOSIS:  Skin lesion of chest wall     POSTOPERATIVE DIAGNOSIS:   Same    INDICATIONS: Lam Fraire is a 67 y.o. female presenting with a Skin lesion of chest wall, for which excision is recommended. The risks, benefits, potential complications and possible alternatives of the procedure were discussed in detail, including complications of but not limited to infection, bleeding, anesthesia-related complications, death, injury to surrounding structures, poor wound healing, positive margins, or need for additional interventions. All questions were answered. The patient wished to proceed and consent was documented in the medical record. FINDINGS:    Skin lesion of chest wall     ANESTHESIA:   Local anesthesia 1% plain lidocaine, 5ml in total    ESTIMATED BLOOD LOSS: Minimal    SPECIMEN(S):   Skin lesion of chest wall     DRAINS & IMPLANTS:  none    COMPLICATIONS: none    DISPOSITION: Home after the procedure    CONDITION: stable     PROCEDURE IN DETAIL:  Prior to beginning the procedure, informed consent was obtained and consent was documented in the medical record.  The patient was brought to the procedure room and positioned supine on the procedure table. Local anesthesia was initiated and a time out was performed in which all were in agreement. The patient was prepped and draped in the usual sterile fashion. The area was inspected and appeared to be a dark red raised nodule approximately 4mm in size at the midline upper chest. After adequate analgesia, a 6mm punch biopsy incision was made and the lesion was excised in its entirety. The lesion was sent for pathology. The wound was inspected and appeared hemostatic. It was closed with 3-0 Nylon interrupted sutures. The procedure was concluded. The skin was washed and dried and sterile dressing(s) applied. The patient tolerated the procedure well with no apparent complications and was discharged home in stable condition. Counts were correct at the end of the case. I was present and primarily performed all critical portions of the case.       Electronically signed:   Otoniel Stauffer MD 10/26/2022 4:53 PM

## 2022-10-24 NOTE — Clinical Note
Sending Flakita Gaines for a referral d/t family hx of multiple cancers, would potentially change surgical plans, so hopefully she can get an appointment and testing soon. Thanks!

## 2022-10-26 ENCOUNTER — HOSPITAL ENCOUNTER (OUTPATIENT)
Dept: GENERAL RADIOLOGY | Age: 72
Discharge: HOME | End: 2022-10-26
Payer: MEDICARE

## 2022-10-26 ENCOUNTER — HOSPITAL ENCOUNTER (OUTPATIENT)
Age: 72
Discharge: HOME | End: 2022-10-26
Payer: MEDICARE

## 2022-10-26 DIAGNOSIS — C50.011 CARCINOMA OF AREOLA OF RIGHT BREAST IN FEMALE, ESTROGEN RECEPTOR POSITIVE (HCC): ICD-10-CM

## 2022-10-26 DIAGNOSIS — J44.9 CHRONIC OBSTRUCTIVE PULMONARY DISEASE, UNSPECIFIED COPD TYPE (HCC): ICD-10-CM

## 2022-10-26 DIAGNOSIS — Z17.0 CARCINOMA OF AREOLA OF RIGHT BREAST IN FEMALE, ESTROGEN RECEPTOR POSITIVE (HCC): ICD-10-CM

## 2022-10-26 PROBLEM — Z80.3 FAMILY HISTORY OF BREAST CANCER IN FIRST DEGREE RELATIVE: Status: ACTIVE | Noted: 2022-10-26

## 2022-10-26 PROCEDURE — 71046 X-RAY EXAM CHEST 2 VIEWS: CPT

## 2022-10-26 ASSESSMENT — ENCOUNTER SYMPTOMS
ABDOMINAL PAIN: 0
SHORTNESS OF BREATH: 1
VOMITING: 0
CHEST TIGHTNESS: 0
EYE REDNESS: 0
COLOR CHANGE: 0
NAUSEA: 0
EYE DISCHARGE: 0
SORE THROAT: 0
ABDOMINAL DISTENTION: 0

## 2022-11-01 ENCOUNTER — OFFICE VISIT (OUTPATIENT)
Dept: SURGERY | Age: 72
End: 2022-11-01
Payer: MEDICARE

## 2022-11-01 VITALS
WEIGHT: 216.2 LBS | DIASTOLIC BLOOD PRESSURE: 90 MMHG | BODY MASS INDEX: 40.82 KG/M2 | HEIGHT: 61 IN | OXYGEN SATURATION: 96 % | SYSTOLIC BLOOD PRESSURE: 136 MMHG | HEART RATE: 101 BPM

## 2022-11-01 DIAGNOSIS — Z17.0 CARCINOMA OF AREOLA OF RIGHT BREAST IN FEMALE, ESTROGEN RECEPTOR POSITIVE (HCC): Primary | ICD-10-CM

## 2022-11-01 DIAGNOSIS — L98.9 SKIN LESION OF CHEST WALL: ICD-10-CM

## 2022-11-01 DIAGNOSIS — Z80.3 FAMILY HISTORY OF BREAST CANCER IN FIRST DEGREE RELATIVE: ICD-10-CM

## 2022-11-01 DIAGNOSIS — C50.011 CARCINOMA OF AREOLA OF RIGHT BREAST IN FEMALE, ESTROGEN RECEPTOR POSITIVE (HCC): Primary | ICD-10-CM

## 2022-11-01 DIAGNOSIS — J44.9 CHRONIC OBSTRUCTIVE PULMONARY DISEASE, UNSPECIFIED COPD TYPE (HCC): ICD-10-CM

## 2022-11-01 PROCEDURE — 3017F COLORECTAL CA SCREEN DOC REV: CPT | Performed by: SURGERY

## 2022-11-01 PROCEDURE — 1090F PRES/ABSN URINE INCON ASSESS: CPT | Performed by: SURGERY

## 2022-11-01 PROCEDURE — G8484 FLU IMMUNIZE NO ADMIN: HCPCS | Performed by: SURGERY

## 2022-11-01 PROCEDURE — G8427 DOCREV CUR MEDS BY ELIG CLIN: HCPCS | Performed by: SURGERY

## 2022-11-01 PROCEDURE — G8399 PT W/DXA RESULTS DOCUMENT: HCPCS | Performed by: SURGERY

## 2022-11-01 PROCEDURE — 1123F ACP DISCUSS/DSCN MKR DOCD: CPT | Performed by: SURGERY

## 2022-11-01 PROCEDURE — 3023F SPIROM DOC REV: CPT | Performed by: SURGERY

## 2022-11-01 PROCEDURE — 99215 OFFICE O/P EST HI 40 MIN: CPT | Performed by: SURGERY

## 2022-11-01 PROCEDURE — G8417 CALC BMI ABV UP PARAM F/U: HCPCS | Performed by: SURGERY

## 2022-11-01 PROCEDURE — 1036F TOBACCO NON-USER: CPT | Performed by: SURGERY

## 2022-11-01 PROCEDURE — 93702 BIS XTRACELL FLUID ANALYSIS: CPT | Performed by: SURGERY

## 2022-11-01 ASSESSMENT — ENCOUNTER SYMPTOMS
NAUSEA: 0
EYE REDNESS: 0
COLOR CHANGE: 0
VOMITING: 0
SHORTNESS OF BREATH: 1
ABDOMINAL DISTENTION: 0
EYE DISCHARGE: 0
SORE THROAT: 0
ABDOMINAL PAIN: 0
CHEST TIGHTNESS: 0

## 2022-11-01 ASSESSMENT — PATIENT HEALTH QUESTIONNAIRE - PHQ9
SUM OF ALL RESPONSES TO PHQ QUESTIONS 1-9: 0
SUM OF ALL RESPONSES TO PHQ9 QUESTIONS 1 & 2: 0
1. LITTLE INTEREST OR PLEASURE IN DOING THINGS: 0
2. FEELING DOWN, DEPRESSED OR HOPELESS: 0

## 2022-11-01 NOTE — PROGRESS NOTES
GENERAL SURGERY NOTE - Outpatient History & Physical  [facility]    PATIENT: Jay Johnson 1950, 67 y.o., female   Date: 11/1/2022  MRN: [id number]   Requesting Provider:  No ref. provider found History Obtained From:  patient, electronic medical record     Reason for Evaluation & Chief Complaint:    Chief Complaint   Patient presents with    Post-Op Check     1st PO BX Skin Lesion Carcinoma of areola Right Breast     HISTORY OF PRESENT ILLNESS:    Lili Elizabeth is a 67 y.o. female presenting for follow up of a new diagnosis of right breast cancer. Last visit she had a punch biopsy of a midline chest skin lesion which was a hemangioma on pathology. The area has been healing, had some reactive erythema, but no drainage. She had a CXR showing stable chronic findings. A genetic counseling visit is pending. 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 She has had 2 previous lumpectomies of the right breast but pathology was benign. Of note, she has had a brain tumor and has a family history of multiple types of cancer. Breast Concerns: no  Nipple Drainage or Retraction: yes: chronically inverted nipples (since even before sugery)  Skin Changes: no  Headaches: no  Vision Changes: no (cataract surgery)  Weight Change: no  Shortness of Breath or Chest Pain: yes: COPD, stable  Bone Pain: yes: arthritis  Lymphedema: no    Workup Includes:   Mammogram/US: 9/8/22, 9/22/22  Impression   Indeterminate 11 mm hypoechoic mass within the right retroareolar region   corresponding to the mammographic area of interest.  Given increasing size   mammographically would recommend further evaluation with ultrasound-guided   biopsy.        BIRADS:   BIRADS - CATEGORY 4B   Carcinoma on bx 10/13/22  DEXA: had one in the 1990's, was told to take calcium  Sozo: (not available, seen in [place] today)     Sozo, L-Dex Measurements Date Interpretation   -4.4 (initial/baseline) 11/1/2022  Initial/Baseline - WNL          Breast History, General  Endocrine Therapy: (none previously)  Oncotype/Genetic Testing: (none previously)  Chemotherapy: \"chemo pill to enhance radiation\" for her brain tumor  Estrogen/HRT: had a hysterectomy and ovaries removed, then took estrogen for ~10 years  Radiation/Environmental Exposure (eg mantle radiation): head radiation for her brain tumor (not of the chest)  Age of menarche: 14  Age at birth of first child: 23  Family History of breast cancer: yes:   - her son had genetic testing when he was diagnosed with the same kind of brain cancer - she isn't sure of the results of his testing  - younger sister - breast cancer (was on immunosuppression for a kidney transplant and then got breast cancer, about 45years old)  - maternal aunt with breast cancer in her [de-identified]  - older sister had bone cancer (and her son/pt's nephew had bone cancer)  - father had leukemia, passed from it  - sister with lung cancer    Right Breast History  Surgery, Date:   Excision/biopsy 3/2020 ([facility]) benign  1974 lumpectomy benign  Breast cancer: yes:    Type, Stage: mucinous carcinoma (Stage pending)   Size: (11mm on imaging)   Receptor Status: ER/WA positive and HER2 negative (HER2 FISH pending)   Node Status: (negative on US)    Radiation Therapy:   Abnormal Imaging: yes: 9/8/22, 9/22/22   Biopsies: yes: 10/13/22 mucinous carcinoma  Other Breast History   Infection: no   Skin lesion excised via punch biopsy 10/24/22 - hemangioma    Left Breast History  Surgery, Date:  Breast cancer: no   Abnormal Imaging: yes: remotely had lesions followed but never any biopsies   Biopsies: no  Other Breast History   Infection: no       Past Medical History:    Past Medical History:   Diagnosis Date    Abnormal mammogram 08/14/2017    0.4 cm hypoechoic mass(most likely a small intramammary lymph node or less likely a complicated cyst.  Follow-up in 6 months recommended with ultrasound    Attempted suicide [facility]) 2012    hanged herself. Brain neoplasm ([facility] [address]     grade II [place] resected 4/13, Dr. Sameer Hoover resected tumor. Dr. Siena Desai oncologist seeing pt also. No chemo. Dr. Sameer Hoover following pt q year. Tumor recurred, right frontal craniotomy and tumor resection 11/14 Dr. Sudarshan Rizvi. Had radiation to the brain     Chronic obstructive pulmonary disease ([facility] [address] 06/15/2016    PFT showed mod sees Dr Shauna Weiss 07/08/2022    positive home test    Cyst of left kidney 09/11/2014    MR I of 7/14. follow-up in 6-12 months    Cyst of pancreas 09/11/2014    Needs follow-up in one year 7/15    Depression     Family history of breast cancer in first degree relative 10/26/2022    Hearing loss     Hearing loss     Herniated disc     L4 &L5    Hyperlipidemia     Kidney stones     Liver dysfunction 06/06/2014    In 7/14 hepatitis ABC negative. Immune studies negative, iron studies normal.  MRI of liver mild fatty liver.  F/u LFTS normal.    Osteopenia 09/26/2014    Sarcoidosis     Skin cancer     nose       Past Surgical History:    Past Surgical History:   Procedure Laterality Date    BREAST BIOPSY Right 03/05/2020    BREAST LESION BIOPSY EXCISION NEEDLE LOCALIZATION MASS RIGHT performed by Rafa Salmon MD at [address] [place] [address] Right 03/05/2020     right breast mass removal by Dr. Gerber Mckay in [place]  Right 02/2022    CATARACT REMOVAL Right 04/2022    CHOLECYSTECTOMY  2006    COLONOSCOPY  2008    f/u 5 years- Dr Ryan Deleon  04/2013    resection of brain tumor Dr. Macario Mckeon ([facility])      LITHOTRIPSY  2005    Clara Ortiz  2011    Dr Christy Antonio Right 10/13/2022    US BREAST NEEDLE BIOPSY RIGHT 10/13/2022 [facility] ULTRASOUND       Current Medications:   Current Outpatient Medications   Medication Sig Dispense Refill    umeclidinium-vilanterol (ANORO ELLIPTA) 62.5-25 MCG/INH AEPB inhaler 1 puff daily 1 each 5    rOPINIRole (REQUIP) 0.5 MG tablet Take 1 tablet by mouth in the morning. 90 tablet 5    albuterol sulfate HFA (PROVENTIL;VENTOLIN;PROAIR) 108 (90 Base) MCG/ACT inhaler Inhale 2 puffs into the lungs every 4 hours as needed for Wheezing 1 each 3    escitalopram (LEXAPRO) 20 MG tablet Take 20 mg by mouth in the morning. Multiple Vitamin (MULTIVITAMIN ADULT PO) Take 1 tablet by mouth daily (with breakfast)      metFORMIN (GLUCOPHAGE-XR) 500 MG extended release tablet Take 1 tablet by mouth daily (with breakfast) 90 tablet 1    atorvastatin (LIPITOR) 40 MG tablet Take 1 tablet by mouth daily 90 tablet 1    Calcium Carbonate (CALCIUM 600 PO) Take 600 mg by mouth 2 times daily      vitamin D3 (CHOLECALCIFEROL) 25 MCG (1000 UT) TABS tablet Take 2 tablets by mouth daily 30 tablet 5    blood glucose test strips (TRUE METRIX BLOOD GLUCOSE TEST) strip Testing once daily, DX=E11.9 50 strip 5    Lancet Device MISC 1 Device by Does not apply route 2 times daily DX: E11.9      Lancets Ultra Thin 14U MISC 1 applicator by Does not apply route 2 times daily 100 each 5    UNABLE TO FIND Please dispense what insurance will cover, blood glucose test strips, DX=E11.9, testing once daily and prn 60 strip 2    traZODone (DESYREL) 50 MG tablet TAKE ONE-HALF TABLET BY MOUTH NIGHTLY 30 tablet 1     No current facility-administered medications for this visit.        Allergies:  Minocycline, Tetracyclines & related, Demeclocycline, and Tetracycline    Social History:   Social History     Socioeconomic History    Marital status:      Spouse name: None    Number of children: None    Years of education: None    Highest education level: None   Tobacco Use    Smoking status: Former     Packs/day: 0.75     Years: 20.00     Pack years: 15.00     Types: Cigarettes     Quit date: 8/22/2003     Years since quittin.2    Smokeless tobacco: Never   Vaping Use    Vaping Use: Never used   Substance and Sexual Activity    Alcohol use: No     Alcohol/week: 0.0 standard drinks    Drug use: No    Sexual activity: Yes     Partners: Male     Social Determinants of Health     Financial Resource Strain: Low Risk     Difficulty of Paying Living Expenses: Not hard at all   Food Insecurity: No Food Insecurity    Worried About Running Out of Food in the Last Year: Never true    Ran Out of Food in the Last Year: Never true       Family History:   Family History   Problem Relation Age of Onset    Heart Attack Mother     Cancer Father         leukemia    Breast Cancer Sister 45    Breast Cancer Maternal Aunt        REVIEW OF SYSTEMS:    Review of Systems   Constitutional:  Negative for chills and fever. HENT:  Negative for congestion and sore throat. Eyes:  Negative for discharge and redness. Respiratory:  Positive for shortness of breath (COPD). Negative for chest tightness. Cardiovascular:  Negative for chest pain and palpitations. Gastrointestinal:  Negative for abdominal distention, abdominal pain, nausea and vomiting. Genitourinary:  Negative for dysuria and flank pain. Musculoskeletal:  Positive for arthralgias. Negative for myalgias. Skin:  Negative for color change and rash. Neurological:  Negative for dizziness and numbness. Psychiatric/Behavioral:  Negative for confusion. The patient is not nervous/anxious. I have reviewed the patient's information pertinent to this visit, including medical history, family history, social history and review of systems. PHYSICAL EXAM:    Vitals:    22 1532   BP: (!) 136/90   Site: Left Upper Arm   Position: Sitting   Cuff Size: Large Adult   Pulse: (!) 101   SpO2: 96%   Weight: 216 lb 3.2 oz (98.1 kg)   Height: 5' 1\" (1.549 m)     Physical Exam  Constitutional:       General: She is not in acute distress. Appearance: She is well-developed.  She is not diaphoretic. HENT:      Head: Normocephalic and atraumatic. Right Ear: External ear normal.      Left Ear: External ear normal.      Mouth/Throat:      Mouth: Mucous membranes are moist.   Eyes:      General:         Right eye: No discharge. Left eye: No discharge. Neck:      Trachea: No tracheal deviation. Cardiovascular:      Rate and Rhythm: Normal rate and regular rhythm. Pulmonary:      Effort: No respiratory distress. Breath sounds: No wheezing. Chest:   Breasts: Alec Score is 5. Right: Inverted nipple (slit like central nipple, flat to areola) present. No swelling, bleeding, nipple discharge, skin change or tenderness. Left: Inverted nipple (slit like central nipple, flat to areola) present. No swelling, bleeding, mass, nipple discharge, skin change or tenderness. Abdominal:      General: There is no distension. Palpations: Abdomen is soft. Tenderness: There is no abdominal tenderness. Musculoskeletal:         General: No tenderness or deformity. Cervical back: Neck supple. Lymphadenopathy:      Upper Body:      Right upper body: No supraclavicular, axillary or pectoral adenopathy. Left upper body: No supraclavicular, axillary or pectoral adenopathy. Skin:     General: Skin is warm and dry. Findings: No rash. Neurological:      Mental Status: She is alert and oriented to person, place, and time. Psychiatric:         Behavior: Behavior normal.       DATA:    Pathology report 10/13/22  Preliminary Diagnosis   Right breast, retroareolar mass, needle core biopsy:   -     MUCINOUS CARCINOMA. 10/24/22  Specimen #[id number]   Final Pathologic Diagnosis:   Skin, upper midline chest, punch biopsy:   -     Hemangioma.      Lab Results   Component Value Date    WBC 6.2 10/29/2021    HGB 14.5 10/29/2021    HCT 45.5 10/29/2021     10/29/2021     05/06/2022    K 4.7 05/06/2022     05/06/2022    CO2 22 05/06/2022 BUN 20 05/06/2022    CREATININE 0.9 05/06/2022    GLUCOSE 139 (H) 05/06/2022    CALCIUM 9.4 05/06/2022    PROT 7.5 05/06/2022    BILITOT 0.4 05/06/2022    AST 18 05/06/2022    ALT 23 05/06/2022    ALKPHOS 118 05/06/2022    INR 0.91 10/14/2012    GLUF 126 (H) 10/29/2021    LABA1C 6.3 05/04/2022       Imaging:   CHELSI POST BX CLIP PLACEMENT RIGHT    Addendum Date: 10/24/2022    ADDENDUM: Pathology results of the right retroareolar breast mass demonstrates mucinous carcinoma. Radiologic and pathologic findings are concordant. BIRADS - CATEGORY 6 Known Biopsy-Proven Cancer. Appropriate action should be taken. OVERALL ASSESSMENT - KNOWN BIOPSY PROVEN MALIGNANCY. Result Date: 10/24/2022  EXAMINATION: ULTRASOUND-GUIDED RIGHT BREAST BIOPSY ULTRASOUND-GUIDED CLIP PLACEMENT POSTPROCEDURE DIGITAL MAMMOGRAM FOR CLIP PLACEMENT 10/13/2022 HISTORY: ORDERING SYSTEM PROVIDED HISTORY: S/P right breast biopsy COMPARISON: 09/22/2022 TECHNIQUE: A timeout was performed to confirm patient identification and site of procedure. Risks, benefits, and alternatives of the procedure were discussed. Informed written consent was obtained. Transverse and longitudinal gray scale images were obtained of the right retroareolar breast mass. The biopsy site was prepped in standard fashion. 1% lidocaine was used for local anesthesia and a small skin incision was made. A 12 gauge biopsy needle was advanced under sonographic guidance via a lateral approach. 3 cores were obtained and the needle was removed. A HydroMARK T4 Butterfly biopsy clip was placed at the biopsy site under ultrasound guidance. Pressure was applied for hemostasis. The patient tolerated the procedure well with no immediate complications. POSTPROCEDURE MAMMOGRAM FOR CLIP PLACEMENT: ML and CC views of the right breast were obtained immediately following the procedure. The biopsy clip is in the correct location with respect to the targeted site.  There is no evidence of biopsy clip migration from the biopsy site. Post biopsy clip placement imaging performed in a separate room. Technically successful ultrasound guided core biopsy of right retroareolar breast mass and  clip marker placement as described above. BIRADS: ZW - Pathology pending. US BREAST BIOPSY W LOC DEVICE 1ST LESION RIGHT    Addendum Date: 10/24/2022    ADDENDUM: Pathology results of the right retroareolar breast mass demonstrates mucinous carcinoma. Radiologic and pathologic findings are concordant. BIRADS - CATEGORY 6 Known Biopsy-Proven Cancer. Appropriate action should be taken. OVERALL ASSESSMENT - KNOWN BIOPSY PROVEN MALIGNANCY. Result Date: 10/24/2022  EXAMINATION: ULTRASOUND-GUIDED RIGHT BREAST BIOPSY ULTRASOUND-GUIDED CLIP PLACEMENT POSTPROCEDURE DIGITAL MAMMOGRAM FOR CLIP PLACEMENT 10/13/2022 HISTORY: ORDERING SYSTEM PROVIDED HISTORY: S/P right breast biopsy COMPARISON: 09/22/2022 TECHNIQUE: A timeout was performed to confirm patient identification and site of procedure. Risks, benefits, and alternatives of the procedure were discussed. Informed written consent was obtained. Transverse and longitudinal gray scale images were obtained of the right retroareolar breast mass. The biopsy site was prepped in standard fashion. 1% lidocaine was used for local anesthesia and a small skin incision was made. A 12 gauge biopsy needle was advanced under sonographic guidance via a lateral approach. 3 cores were obtained and the needle was removed. A HydroMARK T4 Butterfly biopsy clip was placed at the biopsy site under ultrasound guidance. Pressure was applied for hemostasis. The patient tolerated the procedure well with no immediate complications. POSTPROCEDURE MAMMOGRAM FOR CLIP PLACEMENT: ML and CC views of the right breast were obtained immediately following the procedure. The biopsy clip is in the correct location with respect to the targeted site.  There is no evidence of biopsy clip migration from the biopsy site. Post biopsy clip placement imaging performed in a separate room. Technically successful ultrasound guided core biopsy of right retroareolar breast mass and  clip marker placement as described above. BIRADS: ZW - Pathology pending. Pertinent laboratory and imaging studies were personally reviewed if available. IMPRESSION:    Claudette Eke is a 67 y.o. female with new diagnosis right breast mucinous carcinoma    1. Carcinoma of areola of right breast in female, estrogen receptor positive ([address]    2. Chronic obstructive pulmonary disease, unspecified COPD type ([address]    3. Skin lesion of chest wall    4. Family history of breast cancer in first degree relative      Patient Active Problem List    Diagnosis Date Noted    Family history of breast cancer in first degree relative 10/26/2022    Carcinoma of areola of right breast in female, estrogen receptor positive ([address] 10/18/2022    Controlled type 2 diabetes mellitus without complication, without long-term current use of insulin ([address] 04/27/2020    Abnormal mammogram 08/14/2017    Chronic obstructive pulmonary disease ([address] 06/15/2016    Cyst of left kidney 09/11/2014    Cyst of pancreas 09/11/2014    Liver dysfunction 06/06/2014    Mixed hyperlipidemia     Sarcoidosis     Depression     Hearing loss     Oligoastrocytoma, WHO grade 2 ([address]      PLAN:  Discussed findings and options with Claudette Eke. Mucinous carcinoma of the right breast - discussed cancer diagnosis and treatment options including breast conserving therapy, mastectomy w/wo reconstruction. Given her family history, she wishes to have a lumpectomy if genetic testing is negative, but would wish to consider bilateral mastectomies if genetic testing is positive for increased risk of breast cancer. Discussed with BREE Centeno-CNP, will expedite genetic testing to facilitate timely surgery. Will have my  hold a spot in the OR.  Tentatively signed consent for lumpectomy, but will re-discuss if genetic testing shows increased risk. She expressed understanding and agreement with this plan. She follows with Dr. Kimberlee Galo, Oncologist at [place], will CC him on notes  Skin lesion of the midline chest - benign hemagioma. Sutures removed. COPD - CXR stable, no suspicion of metastatic disease  Possible history of osteopenia/osteoporosis (prior DEXA not available) - DEXA scan pending  Estrogen receptor positive breast cancer - will consider endocrine therapy. She is postmenopausal. Check DEXA  Family history of breast cancer and multiple other cancers - referral placed to [facility] for genetic evaluation. The patient feels this would potentially change her preference for breast conservation vs mastectomy, so will await results before finalizing surgery, but will hold an OR date to make her care as timely as possible. Diabetes - controlled, last A1c 6.3 on 5/4/22  Planned procedure: right breast lumpectomy (needle or seed localization) with sentinel lymph node biopsy, and possible mastopexy (crescent or doughnut)  The risks, benefits, potential complications and possible alternatives of the procedure were discussed in detail, including complications of but not limited to infection, bleeding, anesthesia-related complications, death, poor healing or cosmesis, positive margins, recurrent cancer, seroma/hematoma/fluid collection, neurovascular injury, lymphedema, or need for additional interventions. All questions were answered. The patient wished to proceed and consent was documented in the medical record. PAT anticipated: yes  Will request preop clearance from her Pulmonologist, Dr. Bernie Garcia  Informed consent: obtained (Will modify if genetic testing is positive)  Anticipated status: Outpatient  Anticipated location:  [facility]   Follow Up: Return for pre-admission testing and surgery, test results (after testing is complete).     Orders Placed This Encounter   Procedures [facility] (Psychiatric) - Bryanna Greene MD, Pulmonlogy, [place]        No orders of the defined types were placed in this encounter. The patient had a L-DEX SOZO measurement which I reviewed today. The score is within normal limits - see scanned to EMR. Bioimpedance spectroscopy helps identify the onset of lymphedema in an arm or leg before patients experience noticeable swelling. Research has shown that 92% of patients with early detection of lymphedema using L-Dex combined with intervention do not progress to chronic lymphedema. Whenever possible, patients are tested for baseline L-Dex score before cancer treatment begins and then are reassessed during regular follow-up visits using the SOZO device. Otherwise, this can be started postoperatively and continued during regular follow-up visits. If the patient's L-Dex score increases above normal levels, that is a sign that lymphedema is developing and a referral is made to physical therapy for further evaluation and early compression treatment. Lymphedema assessment with the SOZO L-Dex score is recommended to be done every 3 months for the first 3 years and then every 6 months for years 4 and 5 followed by annually afterwards.       Electronically signed by Rocky Fuller MD, 11/2/2022, 12:30 PM

## 2022-11-01 NOTE — Clinical Note
Just FYI, we're waiting on genetic testing results to finalize if she's getting a lumpectomy or bilateral mastectomies. If possible, please hold a surgery date, but should be in 10 or more days to allow results to come back. I spoke with Kathy eCsar, and her office is arranging for STAT genetic testing but the soonest it would be back is 5 days. Thanks!

## 2022-11-02 ENCOUNTER — HOSPITAL ENCOUNTER (OUTPATIENT)
Dept: WOMENS IMAGING | Age: 72
Discharge: HOME | End: 2022-11-02
Payer: MEDICARE

## 2022-11-02 DIAGNOSIS — Z17.0 CARCINOMA OF AREOLA OF RIGHT BREAST IN FEMALE, ESTROGEN RECEPTOR POSITIVE (HCC): ICD-10-CM

## 2022-11-02 DIAGNOSIS — C50.011 CARCINOMA OF AREOLA OF RIGHT BREAST IN FEMALE, ESTROGEN RECEPTOR POSITIVE (HCC): ICD-10-CM

## 2022-11-02 DIAGNOSIS — Z90.722 ACQUIRED ABSENCE OF OVARIES, BILATERAL: ICD-10-CM

## 2022-11-02 PROCEDURE — 77080 DXA BONE DENSITY AXIAL: CPT

## 2022-11-03 ENCOUNTER — HOSPITAL ENCOUNTER (OUTPATIENT)
Age: 72
Discharge: HOME | End: 2022-11-03
Payer: MEDICARE

## 2022-11-03 LAB
ALBUMIN SERPL-MCNC: 4 GM/DL (ref 3.4–5)
ALP BLD-CCNC: 147 IU/L (ref 40–129)
ALT SERPL-CCNC: 39 U/L (ref 10–40)
ANION GAP SERPL CALCULATED.3IONS-SCNC: 13 MMOL/L (ref 4–16)
AST SERPL-CCNC: 22 IU/L (ref 15–37)
BILIRUB SERPL-MCNC: 0.4 MG/DL (ref 0–1)
BUN BLDV-MCNC: 20 MG/DL (ref 6–23)
CALCIUM SERPL-MCNC: 9.2 MG/DL (ref 8.3–10.6)
CHLORIDE BLD-SCNC: 103 MMOL/L (ref 99–110)
CHOLESTEROL, FASTING: 183 MG/DL
CO2: 23 MMOL/L (ref 21–32)
CREAT SERPL-MCNC: 0.8 MG/DL (ref 0.6–1.1)
ESTIMATED AVERAGE GLUCOSE: 134 MG/DL
GFR SERPL CREATININE-BSD FRML MDRD: >60 ML/MIN/1.73M2
GLUCOSE FASTING: 131 MG/DL (ref 70–99)
HBA1C MFR BLD: 6.3 % (ref 4.2–6.3)
HDLC SERPL-MCNC: 48 MG/DL
LDL CHOLESTEROL CALCULATED: 101 MG/DL
POTASSIUM SERPL-SCNC: 4.1 MMOL/L (ref 3.5–5.1)
SODIUM BLD-SCNC: 139 MMOL/L (ref 135–145)
TOTAL PROTEIN: 7.3 GM/DL (ref 6.4–8.2)
TRIGLYCERIDE, FASTING: 170 MG/DL

## 2022-11-03 PROCEDURE — 80053 COMPREHEN METABOLIC PANEL: CPT

## 2022-11-03 PROCEDURE — 83036 HEMOGLOBIN GLYCOSYLATED A1C: CPT

## 2022-11-03 PROCEDURE — 36415 COLL VENOUS BLD VENIPUNCTURE: CPT

## 2022-11-03 PROCEDURE — 80061 LIPID PANEL: CPT

## 2022-11-04 ENCOUNTER — OFFICE VISIT (OUTPATIENT)
Dept: INTERNAL MEDICINE CLINIC | Age: 72
End: 2022-11-04
Payer: MEDICARE

## 2022-11-04 VITALS
WEIGHT: 218.2 LBS | HEART RATE: 72 BPM | SYSTOLIC BLOOD PRESSURE: 138 MMHG | OXYGEN SATURATION: 97 % | TEMPERATURE: 97.1 F | BODY MASS INDEX: 41.23 KG/M2 | RESPIRATION RATE: 16 BRPM | DIASTOLIC BLOOD PRESSURE: 64 MMHG

## 2022-11-04 DIAGNOSIS — E78.2 MIXED HYPERLIPIDEMIA: ICD-10-CM

## 2022-11-04 DIAGNOSIS — D86.9 SARCOIDOSIS: ICD-10-CM

## 2022-11-04 DIAGNOSIS — F32.A DEPRESSION, UNSPECIFIED DEPRESSION TYPE: ICD-10-CM

## 2022-11-04 DIAGNOSIS — H91.93 BILATERAL HEARING LOSS, UNSPECIFIED HEARING LOSS TYPE: ICD-10-CM

## 2022-11-04 DIAGNOSIS — J44.9 CHRONIC OBSTRUCTIVE PULMONARY DISEASE, UNSPECIFIED COPD TYPE (HCC): ICD-10-CM

## 2022-11-04 DIAGNOSIS — E11.9 CONTROLLED TYPE 2 DIABETES MELLITUS WITHOUT COMPLICATION, WITHOUT LONG-TERM CURRENT USE OF INSULIN (HCC): Primary | ICD-10-CM

## 2022-11-04 DIAGNOSIS — C71.9 OLIGOASTROCYTOMA, WHO GRADE 2 (HCC): ICD-10-CM

## 2022-11-04 DIAGNOSIS — C50.011 CARCINOMA OF AREOLA OF RIGHT BREAST IN FEMALE, ESTROGEN RECEPTOR POSITIVE (HCC): ICD-10-CM

## 2022-11-04 DIAGNOSIS — Z17.0 CARCINOMA OF AREOLA OF RIGHT BREAST IN FEMALE, ESTROGEN RECEPTOR POSITIVE (HCC): ICD-10-CM

## 2022-11-04 PROCEDURE — 1123F ACP DISCUSS/DSCN MKR DOCD: CPT | Performed by: INTERNAL MEDICINE

## 2022-11-04 PROCEDURE — G8399 PT W/DXA RESULTS DOCUMENT: HCPCS | Performed by: INTERNAL MEDICINE

## 2022-11-04 PROCEDURE — G8484 FLU IMMUNIZE NO ADMIN: HCPCS | Performed by: INTERNAL MEDICINE

## 2022-11-04 PROCEDURE — G8417 CALC BMI ABV UP PARAM F/U: HCPCS | Performed by: INTERNAL MEDICINE

## 2022-11-04 PROCEDURE — 90694 VACC AIIV4 NO PRSRV 0.5ML IM: CPT | Performed by: INTERNAL MEDICINE

## 2022-11-04 PROCEDURE — 2022F DILAT RTA XM EVC RTNOPTHY: CPT | Performed by: INTERNAL MEDICINE

## 2022-11-04 PROCEDURE — 3044F HG A1C LEVEL LT 7.0%: CPT | Performed by: INTERNAL MEDICINE

## 2022-11-04 PROCEDURE — G8427 DOCREV CUR MEDS BY ELIG CLIN: HCPCS | Performed by: INTERNAL MEDICINE

## 2022-11-04 PROCEDURE — 3017F COLORECTAL CA SCREEN DOC REV: CPT | Performed by: INTERNAL MEDICINE

## 2022-11-04 PROCEDURE — 1090F PRES/ABSN URINE INCON ASSESS: CPT | Performed by: INTERNAL MEDICINE

## 2022-11-04 PROCEDURE — 99214 OFFICE O/P EST MOD 30 MIN: CPT | Performed by: INTERNAL MEDICINE

## 2022-11-04 PROCEDURE — G0008 ADMIN INFLUENZA VIRUS VAC: HCPCS | Performed by: INTERNAL MEDICINE

## 2022-11-04 PROCEDURE — 1036F TOBACCO NON-USER: CPT | Performed by: INTERNAL MEDICINE

## 2022-11-04 PROCEDURE — 3023F SPIROM DOC REV: CPT | Performed by: INTERNAL MEDICINE

## 2022-11-04 ASSESSMENT — PATIENT HEALTH QUESTIONNAIRE - PHQ9
SUM OF ALL RESPONSES TO PHQ9 QUESTIONS 1 & 2: 1
SUM OF ALL RESPONSES TO PHQ QUESTIONS 1-9: 1
1. LITTLE INTEREST OR PLEASURE IN DOING THINGS: 0
2. FEELING DOWN, DEPRESSED OR HOPELESS: 1
SUM OF ALL RESPONSES TO PHQ QUESTIONS 1-9: 1

## 2022-11-04 NOTE — PROGRESS NOTES
Vaccine Information Sheet, \"Influenza - Inactivated\"  given to Guy Barton, or parent/legal guardian of  Guy Barton and verbalized understanding. Patient responses:    Have you ever had a reaction to a flu vaccine? No  Are you able to eat eggs without adverse effects? Yes  Do you have any current illness? No  Have you ever had [place] Syndrome? No    Flu vaccine given per order. Please see immunization tab.

## 2022-11-04 NOTE — PROGRESS NOTES
Yes    Carcinoma of areola of right breast in female, estrogen receptor positive ([address]     Mixed hyperlipidemia     Sarcoidosis     Depression, unspecified depression type     Bilateral hearing loss, unspecified hearing loss type     Oligoastrocytoma, WHO grade 2 (HCC)     Chronic obstructive pulmonary disease, unspecified COPD type ([address]        ASSESSMENT/PLAN:    1. Controlled type 2 diabetes mellitus without complication, without long-term current use of insulin (HCC)  Overview:  Blood sugar around 500. Hemoglobin A1c 12.3 on 4/21/2020  Hemoglobin A1c is 6.3 on 11/3/2022  Metformin  500 mg daily. Continue that follow diet  2. Carcinoma of areola of right breast in female, estrogen receptor positive [facility])   Patient is seen surgeon Dr. Tenisha Powers. She is going through genetic testing   She has follow-up appointment with her for surgery  3. Mixed hyperlipidemia  Overview:  Patient is taking atorvastatin  Lipid profile is acceptable. LDL is 101 on 11/3/2022  Continue atorvastatin and follow diet  4. Sarcoidosis  Overview:  Seen  Dr Tamara Gilford. He saw patient and he started her on inhaler Anora and albuterol prn  5. Depression, unspecified depression type  Overview:  Had suicidal attempt by hanging. Sees psychiatrist Dr. Pastor Elder  On trazodone and risperidone and bupropion  Dr Pastor Elder retired : pt  Does not want to see new one  She stopped trazodone and risperidone and wellbutrin  Refer to [facility] care : pt did not go. She feels well. Pt is going to [facility] and is on escitolopram now. 6. Bilateral hearing loss, unspecified hearing loss type  Overview:  Has hearing aids bilaterally  7. Oligoastrocytoma, WHO grade 2 ([address]  Overview:  grade II Oligo resected 4/13, Dr. Michele Floyd resected tumor. Dr. Jacquelyn Hunt oncologist seeing pt also. No chemo. Tumor recurred, right frontal craniotomy and tumor resection 11/14 Dr. Sruthi Guerin. Had radiation to the brain  Dr. Lu Guzman following pt q year.   Pt states she had MRI brain in 7 or 8/2017: was told it was stable. F/u in one year  Seen Dr Aime Mon last week and she was told to be stable. Seen Dr Mary Moreno 1/2020 : 12/2021   8. Chronic obstructive pulmonary disease, unspecified COPD type ([facility]  Overview:  PFT showed mod sees Dr Raymond Gentile RW  Used Anora and ventolin prn. Continue current medications    9. Flu vaccine given today  Return to office in 3 months. Mediations reviewed with the patient. Continue current medications. Appropriate prescriptions are addressed. After visit summeryprovided. Follow up as directed sooner if needed. Questions answered and patient verbalizes understanding. Call for any problems, questions, or concerns. Allergies   Allergen Reactions    Minocycline     Tetracyclines & Related     Demeclocycline Rash    Tetracycline Rash     Current Outpatient Medications   Medication Sig Dispense Refill    traZODone (DESYREL) 50 MG tablet TAKE ONE-HALF TABLET BY MOUTH NIGHTLY 30 tablet 1    umeclidinium-vilanterol (ANORO ELLIPTA) 62.5-25 MCG/INH AEPB inhaler 1 puff daily 1 each 5    rOPINIRole (REQUIP) 0.5 MG tablet Take 1 tablet by mouth in the morning. 90 tablet 5    escitalopram (LEXAPRO) 20 MG tablet Take 20 mg by mouth in the morning.       Multiple Vitamin (MULTIVITAMIN ADULT PO) Take 1 tablet by mouth daily (with breakfast)      metFORMIN (GLUCOPHAGE-XR) 500 MG extended release tablet Take 1 tablet by mouth daily (with breakfast) 90 tablet 1    atorvastatin (LIPITOR) 40 MG tablet Take 1 tablet by mouth daily 90 tablet 1    Calcium Carbonate (CALCIUM 600 PO) Take 600 mg by mouth 2 times daily      vitamin D3 (CHOLECALCIFEROL) 25 MCG (1000 UT) TABS tablet Take 2 tablets by mouth daily 30 tablet 5    albuterol sulfate HFA (PROVENTIL;VENTOLIN;PROAIR) 108 (90 Base) MCG/ACT inhaler Inhale 2 puffs into the lungs every 4 hours as needed for Wheezing (Patient not taking: Reported on 11/4/2022) 1 each 3    blood glucose test strips (TRUE METRIX BLOOD GLUCOSE TEST) strip Testing once daily, DX=E11.9 50 strip 5    Lancet Device MISC 1 Device by Does not apply route 2 times daily DX: E11.9      Lancets Ultra Thin 01J MISC 1 applicator by Does not apply route 2 times daily 100 each 5    UNABLE TO FIND Please dispense what insurance will cover, blood glucose test strips, DX=E11.9, testing once daily and prn 60 strip 2     No current facility-administered medications for this visit. Past Medical History:   Diagnosis Date    Abnormal mammogram 08/14/2017    0.4 cm hypoechoic mass(most likely a small intramammary lymph node or less likely a complicated cyst.  Follow-up in 6 months recommended with ultrasound    Attempted suicide ([facility] 2012    hanged herself. Brain neoplasm ([facility]     grade II Oligo resected 4/13, Dr. Sanjay Suero resected tumor. Dr. Juan Alonzo oncologist seeing pt also. No chemo. Dr. Sanjay Suero following pt q year. Tumor recurred, right frontal craniotomy and tumor resection 11/14 Dr. Juarez Jernigan. Had radiation to the brain     Chronic obstructive pulmonary disease ([facility] 06/15/2016    PFT showed mod sees Dr Tasia Claude 07/08/2022    positive home test    Cyst of left kidney 09/11/2014    MR I of 7/14. follow-up in 6-12 months    Cyst of pancreas 09/11/2014    Needs follow-up in one year 7/15    Depression     Family history of breast cancer in first degree relative 10/26/2022    Hearing loss     Hearing loss     Herniated disc     L4 &L5    Hyperlipidemia     Kidney stones     Liver dysfunction 06/06/2014    In 7/14 hepatitis ABC negative. Immune studies negative, iron studies normal.  MRI of liver mild fatty liver.  F/u LFTS normal.    Osteopenia 09/26/2014    Sarcoidosis     Skin cancer     nose     Past Surgical History:   Procedure Laterality Date    BREAST BIOPSY Right 03/05/2020    BREAST LESION BIOPSY EXCISION NEEDLE LOCALIZATION MASS RIGHT performed by Sadie Taylor MD at [address] [place] [address] Right 03/05/2020     right breast mass removal by Dr. Graciela Joyce in [address] Right 2022    CATARACT REMOVAL Right 2022    CHOLECYSTECTOMY  2006    COLONOSCOPY  2008    f/u 5 years- Dr Leland Young  2013    resection of brain tumor Dr. Juno Tran ([facility]      LITHOTRIPSY  2005    MAMMO IMPLANT DIGITAL DIAG BI      Dr Gaviota Lincoln Right 10/13/2022    US BREAST NEEDLE BIOPSY RIGHT 10/13/2022 [facility] ULTRASOUND     Social History     Tobacco Use    Smoking status: Former     Packs/day: 0.75     Years: 20.00     Pack years: 15.00     Types: Cigarettes     Quit date: 2003     Years since quittin.2    Smokeless tobacco: Never   Substance Use Topics    Alcohol use: No     Alcohol/week: 0.0 standard drinks       LAB REVIEW:  CBC:   Lab Results   Component Value Date/Time    WBC 6.2 10/29/2021 11:30 AM    HGB 14.5 10/29/2021 11:30 AM    HCT 45.5 10/29/2021 11:30 AM     10/29/2021 11:30 AM     Lipids:   Lab Results   Component Value Date    HDL 48 2022    LDLCALC 101 (H) 2022    LDLDIRECT 108 (H) 10/29/2021    TRIGLYCFAST 170 (H) 2022    CHOLFAST 183 2022     Renal:   Lab Results   Component Value Date/Time    BUN 20 2022 11:00 AM    CREATININE 0.8 2022 11:00 AM     2022 11:00 AM    K 4.1 2022 11:00 AM    ALT 39 2022 11:00 AM    AST 22 2022 11:00 AM    GLUCOSE 139 2022 11:15 AM    GLUF 131 2022 11:00 AM     PT/INR:   Lab Results   Component Value Date/Time    INR 0.91 10/14/2012 12:00 PM     A1C:   Lab Results   Component Value Date    LABA1C 6.3 2022           Kathy Chaparro MD, 2022 , 11:34 AM

## 2022-11-16 ENCOUNTER — HOSPITAL ENCOUNTER (OUTPATIENT)
Dept: INFUSION THERAPY | Age: 72
Discharge: HOME | End: 2022-11-16

## 2022-11-22 RX ORDER — ATORVASTATIN CALCIUM 40 MG/1
40 TABLET, FILM COATED ORAL DAILY
Qty: 90 TABLET | Refills: 1 | Status: SHIPPED | OUTPATIENT
Start: 2022-11-22

## 2022-11-29 ENCOUNTER — TELEPHONE (OUTPATIENT)
Dept: ONCOLOGY | Age: 72
End: 2022-11-29

## 2022-12-02 ENCOUNTER — HOSPITAL ENCOUNTER (OUTPATIENT)
Dept: INFUSION THERAPY | Age: 72
Discharge: HOME | End: 2022-12-02
Payer: MEDICARE

## 2022-12-02 ENCOUNTER — OFFICE VISIT (OUTPATIENT)
Dept: ONCOLOGY | Age: 72
End: 2022-12-02

## 2022-12-02 VITALS — WEIGHT: 217 LBS | BODY MASS INDEX: 37.05 KG/M2 | HEIGHT: 64 IN

## 2022-12-02 DIAGNOSIS — C71.9 OLIGOASTROCYTOMA, WHO GRADE 2 (HCC): ICD-10-CM

## 2022-12-02 DIAGNOSIS — C50.011 CARCINOMA OF AREOLA OF RIGHT BREAST IN FEMALE, ESTROGEN RECEPTOR POSITIVE (HCC): ICD-10-CM

## 2022-12-02 DIAGNOSIS — Z71.83 ENCOUNTER FOR NONPROCREATIVE GENETIC COUNSELING: Primary | ICD-10-CM

## 2022-12-02 DIAGNOSIS — Z17.0 CARCINOMA OF AREOLA OF RIGHT BREAST IN FEMALE, ESTROGEN RECEPTOR POSITIVE (HCC): ICD-10-CM

## 2022-12-02 DIAGNOSIS — Z80.3 FAMILY HISTORY OF BREAST CANCER IN FIRST DEGREE RELATIVE: ICD-10-CM

## 2022-12-02 PROCEDURE — 99211 OFF/OP EST MAY X REQ PHY/QHP: CPT

## 2022-12-02 NOTE — PROGRESS NOTES
MA Rooming Questions  Patient: Lam Fraire  MRN: [id number]    Date: 12/2/2022        Genetics Consult             Lupillo Kerr

## 2022-12-05 ENCOUNTER — TELEPHONE (OUTPATIENT)
Dept: SURGERY | Age: 72
End: 2022-12-05

## 2022-12-06 ENCOUNTER — TELEPHONE (OUTPATIENT)
Dept: SURGERY | Age: 72
End: 2022-12-06

## 2022-12-06 NOTE — TELEPHONE ENCOUNTER
Notification or Prior Authorization is not required for the requested services    This [employer] Medicare Advantage members plan does not currently require a prior authorization for these services. If you have general questions about the prior authorization requirements, please call us at [phone] or visit [employer] > Clinician Resources > Advance and Admission Notification Requirements. The number above acknowledges your notification. Please write this number down for future reference. Notification is not a guarantee of coverage or payment.     Decision ID #:[id number]

## 2022-12-08 NOTE — PROGRESS NOTES
.Surgery @ [facility] on 12/15/22 you will be called 12/14/22 with times               1. Do not eat or drink anything after midnight - unless instructed by your doctor prior to surgery. This includes                   no water, chewing gum or mints. 2. Follow your directions as prescribed by the doctor for your procedure and medications. Use inhalers (bring them with you) take Metformin with sips of water. 3. Check with your Doctor regarding stopping vitamins, supplements, blood thinners and follow their instructions. Stop vitamins, supplements and NSAIDS: 12/8/22   4. Do not smoke, vape or use chewing tobacco morning of surgery. Do not drink any alcoholic beverages 24 hours prior to surgery. This includes NA Beer. No street drugs 7 days prior to surgery. 5. You may brush your teeth and gargle the morning of surgery. DO NOT SWALLOW WATER   6. You MUST make arrangements for a responsible adult to take you home after your surgery and be able to check on you every couple                   hours for the day. You will not be allowed to leave alone or drive yourself home. It is strongly suggested someone stay with you the first 24                   hrs. Your surgery will be cancelled if you do not have a ride home. 7. Please wear simple, loose fitting clothing to the hospital.  [place] not bring valuables (money, credit cards, checkbooks, etc.) Do not wear any                   makeup (including no eye makeup) or nail polish on your fingers or toes. 8. DO NOT wear any jewelry or piercings on day of surgery. All body piercing jewelry must be removed. 9. If you have dentures, they will be removed before going to the OR; we will provide you a container. If you wear contact lenses or glasses,                  they will be removed; please bring a case for them. 10. If you  have a Living Will and Durable Power of  for Healthcare, please bring in a copy. 11. Please bring picture ID,  insurance card, paperwork from the doctors office    (H & P, Consent, & card for implantable devices). 12. Take a shower the morning of your procedure with Hibiclens or an anti-bacterial soap. Do not apply any make-up, deodorant, lotion, oil or powder. 15.  Enter thru the main entrance on the day of surgery.

## 2022-12-12 NOTE — PROGRESS NOTES
GENETIC COUNSELING     12/11/22  Shira Tracey  1950  67 y.o. Referred By:  Dr. Debbie Reis  Referred For: Initial genetic risk evaluation and testing    SUMMARY:  Maddie Jackson was referred for genetic counseling today due to personal and family history of cancer. She meets NCCN guidelines for genetic testing, so after informed consent, blood was drawn for the stat breast with reflex to common hereditary cancer panel. 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 She has had 2 previous lumpectomies of the right breast with benign pathology. She has had a brain tumor in the past.    No previous genetic testing  No known mutation  Not adopted nor a twin  No AJ heritage  Western  ancestry  Previous  colonoscopy 2016 with ? Polyps  Smoked less than 1ppd for 26 years, stopped 23 years ago  No etoh  Retired    Menarche 14, surgical menopause 38  First child 20  3 pregnancies, 3 live births, 1 csection   No breast feeding  No abnormal pap  Ocp x 14 years  Ocp x ?  Years      Surgical History:    Past Surgical History:   Procedure Laterality Date    BREAST BIOPSY Right 03/05/2020    BREAST LESION BIOPSY EXCISION NEEDLE LOCALIZATION MASS RIGHT performed by Rena Garcia MD at [address] Right 03/05/2020     [facility]/2022    CATARACT REMOVAL Right 04/2022    CHOLECYSTECTOMY  2006    COLONOSCOPY  2008    f/u 5 years- Dr Dillon Ramirez  04/2013    resection of brain tumor Dr. Cierra Man (4 [facility])      LITHOTRIPSY  2005    Preet Sigala  2011    Dr Ana Suresh Right 10/13/2022    US BREAST NEEDLE BIOPSY RIGHT 10/13/2022 [facility] ULTRASOUND Medical Problems: Patient Active Problem List:     Mixed hyperlipidemia     Sarcoidosis     Depression     Hearing loss     Oligoastrocytoma, WHO grade 2 (HCC)     Liver dysfunction     Cyst of left kidney     Cyst of pancreas     Chronic obstructive pulmonary disease (HCC)     Abnormal mammogram     Controlled type 2 diabetes mellitus without complication, without long-term current use of insulin (HCC)     Carcinoma of areola of right breast in female, estrogen receptor positive ([facility]     Family history of breast cancer in first degree relative       Family History:  A three-generation pedigree was obtained today and will be saved with the patient's record. Patient's family history is significant for the following:  See pedigree for information about family structure and unaffected relatives. Mother- no cancer,  68  Maternal grandmother- no cancer  Maternal grandfather- CLL  64  Father- no cancer  71  Paternal grandparents- no cancer  Son- astrocytoma- dx 48, living 46  Two unaffected children  Sister- sarcoma- dx 37,  48  Sister breast- dx 28,  45  Sister- lung cancer dx 62,  61  Four unaffected siblings  One maternal aunt- breast 80,  80  2 unaffected maternal aunts  Two unaffected maternal uncles  No affected maternal cousins  Paternal uncle- colon cancer 77  Paternal aunt- liver cancer  59  Paternal cousin- stomach  48  Paternal cousin- sarcoma- 59,  79  Paternal cousin- sarcoma dx 45, living 76      Risk Assessment:  [address] (NCCN) criteria for genetic testing based on personal and family history. Overall, there is a high risk of having a hereditary carncer syndrome. We discussed high risk syndromes such as HBOC, weston, as well as other more moderate risk genetic mutations that could have contributed to the cancer in the family.     Risk Model (if applicable):    Hereditary Breast and Ovarian Cancer  About 10-20% of breast and/or ovarian cancers are due to inherited mutations in cancer genes such as BRCA1 and BRCA2. Typical features of a family with HBOC include breast cancer younger than age 48, multiple generations affected, and bilateral or multiple cancers in the same person. For many gene mutations, there are national guidelines that have recommendations for medical management and/or preventative options. Cardoso Syndrome  Cardoso Syndrome is a genetic condition characterized by early onset colorectal cancer and endometrial cancer. It is associated with an elevated risk of other cancers such as gastric, ovarian, urinary tract, small gurpreet, brain and pancreatic cancer. Cardoso syndrome is caused by a genetic change, or mutation, in one of five known genes:  MLH1, MSH2, EPCAM, MSH6, or PMS2. If a person has a mutation in one of these genes, they face increased cancer risks and also have a 50% chance of passing the mutation down to their children. There are national guidelines that have recommendations for medical management and/or preventative options. Genetic Testing    Many laboratories are now using next generation sequencing to test a panel of cancer genes at the same time - this reduces the turnaround time and cost compared to the single gene approach to testing. In our discussion, we discussed three possible test results: positive, negative, and indeterminate, (variant of unknown significance). We addressed the 2008 federal legislation, LISA, which protects individuals from discrimination in health insurance and employment. More information is available at www. GINAhelp.org. The pros and cons of panel testing were reviewed, including the fact that there are some genes that do not have well-defined cancer risks or recommendations.     A psychological assessment was performed, and the patient understands how the results will be incorporated into medical management, as well as potential implications for family members. Consent: All elements of the informed consent were discussed with Indu Ogden and signed consent was obtained for the gene panel. Medical decision making was of high complexity, as it included a comprehensive discussion of all relevant options for genetic testing and implications for patient and family members. The patient was educated that the lab will verify insurance coverage before initiating testing and call if there is out-of-pocket for any reason. Patient was educated that results are expected to be available in approximately 3 weeks and will then be contacted. Plan:  Stat breast panel was negative, awaiting full panel results. The patient was educated to continue routine follow-up with their healthcare providers. Patient was educated that if they obtain any additional information regarding the family medical history, or if there are any changes to the reported personal or family health history, to please contact us for updated risk assessment. 60 minutes were spent with the patient, with over 50% of the time spent in face to face counseling and coordination of care.     BREE Oreilly - CNP    Recipients:

## 2022-12-13 ENCOUNTER — TELEPHONE (OUTPATIENT)
Dept: ONCOLOGY | Age: 72
End: 2022-12-13

## 2022-12-13 NOTE — TELEPHONE ENCOUNTER
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 We reviewed association of AXIN2 with colorectal polyps and colon cancer. However, since it is a VUS we will just recommend surveillance per GI recommendations. Copy of repot to patient via mail.

## 2022-12-14 ENCOUNTER — ANESTHESIA EVENT (OUTPATIENT)
Dept: OPERATING ROOM | Age: 72
End: 2022-12-14
Payer: MEDICARE

## 2022-12-14 NOTE — PROGRESS NOTES
12/14/22 - Notified patient's spouse surgery @ [facility] on 12/15/22 @ 1100, SN & NL at [address], arrival 0700. Understanding verbalized.

## 2022-12-14 NOTE — ANESTHESIA PRE PROCEDURE
Department of Anesthesiology  Preprocedure Note       Name:  Stella Bell   Age:  67 y.o.  :  1950                                          MRN:  [id number]         Date:  2022      Surgeon: Jayne Kee):  Bao Bean MD    Procedure: Procedure(s):  RIGHT BREAST LUMPECTOMY PARTIAL MASTECTOMY WITH SEE OR WIRE LOCALIZATION, SENTINEL LYMPH NODE BIOPSY, POSSIBLE MASTOPEXY (CRESCENT OR DOUGHNUT)    Medications prior to admission:   Prior to Admission medications    Medication Sig Start Date End Date Taking? Authorizing Provider   atorvastatin (LIPITOR) 40 MG tablet Take 1 tablet by mouth daily 22   Lowell Braxton MD   traZODone (DESYREL) 50 MG tablet TAKE ONE-HALF TABLET BY MOUTH NIGHTLY 22   Marco Guadalupe MD   umeclidinium-vilanterol (ANORO ELLIPTA) 62.5-25 MCG/INH AEPB inhaler 1 puff daily 22   Marco Guadalupe MD   rOPINIRole (REQUIP) 0.5 MG tablet Take 1 tablet by mouth in the morning.   Patient not taking: Reported on 2022   [address], MD   albuterol sulfate HFA (PROVENTIL;VENTOLIN;PROAIR) 108 (90 Base) MCG/ACT inhaler Inhale 2 puffs into the lungs every 4 hours as needed for Wheezing 22   Tova Hopkins MD   escitalopram (LEXAPRO) 20 MG tablet Take 20 mg by mouth in the morning. 22   Historical Provider, MD   Multiple Vitamin (MULTIVITAMIN ADULT PO) Take 1 tablet by mouth daily (with breakfast)    Historical Provider, MD   metFORMIN (GLUCOPHAGE-XR) 500 MG extended release tablet Take 1 tablet by mouth daily (with breakfast) 22   Mark Martinez MD   Calcium Carbonate (CALCIUM 600 PO) Take 600 mg by mouth 2 times daily    Historical Provider, MD   vitamin D3 (CHOLECALCIFEROL) 25 MCG (1000 UT) TABS tablet Take 2 tablets by mouth daily 21   Mark Martinez MD   blood glucose test strips (TRUE METRIX BLOOD GLUCOSE TEST) strip Testing once daily, DX=E11.9 10/6/20   Mark Martinez MD   Lancet Device MISC 1 Device by Does not apply route 2 times daily DX: E11.9    Historical Provider, MD   Lancets Ultra Thin 04T MISC 1 applicator by Does not apply route 2 times daily 9/23/20   Jena Nicolas MD   UNABLE TO FIND Please dispense what insurance will cover, blood glucose test strips, DX=E11.9, testing once daily and prn 9/9/20   Jena Nicolas MD       Current medications:    No current facility-administered medications for this encounter. Current Outpatient Medications   Medication Sig Dispense Refill    atorvastatin (LIPITOR) 40 MG tablet Take 1 tablet by mouth daily 90 tablet 1    traZODone (DESYREL) 50 MG tablet TAKE ONE-HALF TABLET BY MOUTH NIGHTLY 30 tablet 1    umeclidinium-vilanterol (ANORO ELLIPTA) 62.5-25 MCG/INH AEPB inhaler 1 puff daily 1 each 5    rOPINIRole (REQUIP) 0.5 MG tablet Take 1 tablet by mouth in the morning. (Patient not taking: Reported on 12/8/2022) 90 tablet 5    albuterol sulfate HFA (PROVENTIL;VENTOLIN;PROAIR) 108 (90 Base) MCG/ACT inhaler Inhale 2 puffs into the lungs every 4 hours as needed for Wheezing 1 each 3    escitalopram (LEXAPRO) 20 MG tablet Take 20 mg by mouth in the morning.       Multiple Vitamin (MULTIVITAMIN ADULT PO) Take 1 tablet by mouth daily (with breakfast)      metFORMIN (GLUCOPHAGE-XR) 500 MG extended release tablet Take 1 tablet by mouth daily (with breakfast) 90 tablet 1    Calcium Carbonate (CALCIUM 600 PO) Take 600 mg by mouth 2 times daily      vitamin D3 (CHOLECALCIFEROL) 25 MCG (1000 UT) TABS tablet Take 2 tablets by mouth daily 30 tablet 5    blood glucose test strips (TRUE METRIX BLOOD GLUCOSE TEST) strip Testing once daily, DX=E11.9 50 strip 5    Lancet Device MISC 1 Device by Does not apply route 2 times daily DX: E11.9      Lancets Ultra Thin 21I MISC 1 applicator by Does not apply route 2 times daily 100 each 5    UNABLE TO FIND Please dispense what insurance will cover, blood glucose test strips, DX=E11.9, testing once daily and prn 60 strip 2       Allergies: Allergies   Allergen Reactions    Minocycline     Tetracyclines & Related     Demeclocycline Rash    Tetracycline Rash       Problem List:    Patient Active Problem List   Diagnosis Code    Mixed hyperlipidemia E78.2    Sarcoidosis D86.9    Depression F32. 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    Liver dysfunction K76.89    Cyst of left kidney N28.1    Cyst of pancreas K86.2    Chronic obstructive pulmonary disease (HCC) J44.9    Abnormal mammogram R92.8    Controlled type 2 diabetes mellitus without complication, without long-term current use of insulin (HCC) E11.9    Carcinoma of areola of right breast in female, estrogen receptor positive ([facility] C50.011, Z17.0    Family history of breast cancer in first degree relative Z80.3       Past Medical History:        Diagnosis Date    Abnormal mammogram 08/14/2017    0.4 cm hypoechoic mass(most likely a small intramammary lymph node or less likely a complicated cyst.  Follow-up in 6 months recommended with ultrasound    Attempted suicide ([facility] 2012    hanged herself.  Brain neoplasm ([facility]     grade II Oligo resected 4/13, Dr. Michele Floyd resected tumor. Dr. Jacquelyn Hunt oncologist seeing pt also. No chemo. Dr. Michele Floyd following pt q year. Tumor recurred, right frontal craniotomy and tumor resection 11/14 Dr. Sruthi Guerin. Had radiation to the brain     Chronic obstructive pulmonary disease ([facility] 06/15/2016    PFT showed mod sees Dr Amanda Johnson 07/08/2022    positive home test    Cyst of left kidney 09/11/2014    MR I of 7/14. follow-up in 6-12 months    Cyst of pancreas 09/11/2014    Needs follow-up in one year 7/15    Depression     Family history of breast cancer in first degree relative 10/26/2022    Hearing loss     Hearing loss     Herniated disc     L4 &L5    Hyperlipidemia     Kidney stones     Liver dysfunction 06/06/2014    In 7/14 hepatitis ABC negative.   Immune studies negative, iron studies normal.  MRI of liver mild fatty liver. F/u LFTS normal.    Osteopenia 2014    Sarcoidosis     Skin cancer     nose       Past Surgical History:        Procedure Laterality Date    BREAST BIOPSY Right 2020    BREAST LESION BIOPSY EXCISION NEEDLE LOCALIZATION MASS RIGHT performed by Je Sutherland MD at [address] Right 2020     right breast mass removal by Dr. Miguel Ángel Omalley in [address] Right 2022    CATARACT REMOVAL Right 2022    CHOLECYSTECTOMY  2006    COLONOSCOPY  2008    f/u 5 years- Dr Grant Lyle  2013    resection of brain tumor Dr. Elizabeth Vázquez ([facility])      LITHOTRIPSY  2005    MAMMO IMPLANT DIGITAL DIAG BI      Dr Nury Sanchez Right 10/13/2022    [place] BREAST NEEDLE BIOPSY RIGHT [place] [facility] [place]       Social History:    Social History     Tobacco Use    Smoking status: Former     Packs/day: 0.75     Years: 20.00     Pack years: 15.00     Types: Cigarettes     Quit date: 2003     Years since quittin.3    Smokeless tobacco: Never   Substance Use Topics    Alcohol use: No     Alcohol/week: 0.0 standard drinks                                Counseling given: Not Answered      Vital Signs (Current):   Vitals:    22 0842   Weight: 217 lb (98.4 kg)   Height: 5' 5.5\" (1.664 m)                                              BP Readings from Last 3 Encounters:   22 138/64   22 (!) 136/90   10/24/22 108/72       NPO Status:                                                                                 BMI:   Wt Readings from Last 3 Encounters:   22 217 lb (98.4 kg)   22 219 lb (99.3 kg)   22 218 lb 3.2 oz (99 kg)     Body mass index is 35.56 kg/m².     CBC:   Lab Results   Component Value Date/Time    WBC 6.2 10/29/2021 11:30 AM    RBC 5.23 10/29/2021 11:30 AM    HGB 14.5 10/29/2021 11:30 AM    HCT 45.5 10/29/2021 11:30 AM    MCV 87.0 10/29/2021 11:30 AM    RDW 13.1 10/29/2021 11:30 AM     10/29/2021 11:30 AM       CMP:   Lab Results   Component Value Date/Time     11/03/2022 11:00 AM    K 4.1 11/03/2022 11:00 AM     11/03/2022 11:00 AM    CO2 23 11/03/2022 11:00 AM    BUN 20 11/03/2022 11:00 AM    CREATININE 0.8 11/03/2022 11:00 AM    GFRAA >60 05/06/2022 11:15 AM    AGRATIO 1.5 07/20/2020 01:54 PM    LABGLOM >60 11/03/2022 11:00 AM    GLUCOSE 139 05/06/2022 11:15 AM    PROT 7.3 11/03/2022 11:00 AM    PROT 7.8 10/14/2012 12:00 PM    CALCIUM 9.2 11/03/2022 11:00 AM    BILITOT 0.4 11/03/2022 11:00 AM    ALKPHOS 147 11/03/2022 11:00 AM    AST 22 11/03/2022 11:00 AM    ALT 39 11/03/2022 11:00 AM       POC Tests: No results for input(s): POCGLU, POCNA, POCK, POCCL, POCBUN, POCHEMO, POCHCT in the last 72 hours. Coags:   Lab Results   Component Value Date/Time    PROTIME 8.9 10/14/2012 12:00 PM    INR 0.91 10/14/2012 12:00 PM    APTT 20.5 10/14/2012 12:00 PM       HCG (If Applicable): No results found for: PREGTESTUR, PREGSERUM, HCG, HCGQUANT     ABGs:   Lab Results   Component Value Date/Time    PO2ART 71 10/14/2012 12:21 PM    POV6JQU 37.4 10/14/2012 12:21 PM    WZP3FDA 19 10/14/2012 12:21 PM    BEART 8 10/14/2012 12:21 PM        Type & Screen (If Applicable):  No results found for: LABABO, LABRH    Drug/Infectious Status (If Applicable):  Lab Results   Component Value Date/Time    HEPCAB 0.02 06/09/2014 09:15 AM       COVID-19 Screening (If Applicable): No results found for: COVID19        Anesthesia Evaluation  Patient summary reviewed   history of anesthetic complications: PONV.   Airway: Mallampati: IV  TM distance: >3 FB   Neck ROM: full  Mouth opening: < 3 FB   Dental:    (+) upper dentures      Pulmonary: breath sounds clear to auscultation  (+) COPD:                            ROS comment: Sarcoidosis  Former smoker   Cardiovascular:    (+) hyperlipidemia        Rhythm: regular             Beta Blocker:  Not on Beta Blocker         Neuro/Psych:   (+) depression/anxiety             GI/Hepatic/Renal:   (+) renal disease: kidney stones, morbid obesity          Endo/Other:    (+) DiabetesType II DM, , : arthritis: OA., malignancy/cancer (breast, brain). Abdominal:   (+) obese,           Vascular: negative vascular ROS. Other Findings:           Anesthesia Plan      general     ASA 3       Induction: intravenous. MIPS: Postoperative opioids intended and Prophylactic antiemetics administered. Anesthetic plan and risks discussed with patient. Plan discussed with CRNA. Attending anesthesiologist reviewed and agrees with Preprocedure content          Pre Anesthesia Assessment complete. Chart reviewed on 12/14/2022. This is a chart review only.       Kaiden Eller, DO   12/14/2022

## 2022-12-15 ENCOUNTER — HOSPITAL ENCOUNTER (OUTPATIENT)
Dept: ULTRASOUND IMAGING | Age: 72
Discharge: HOME | End: 2022-12-15
Payer: MEDICARE

## 2022-12-15 ENCOUNTER — HOSPITAL ENCOUNTER (OUTPATIENT)
Dept: NUCLEAR MEDICINE | Age: 72
Discharge: HOME | End: 2022-12-15
Payer: MEDICARE

## 2022-12-15 ENCOUNTER — ANESTHESIA (OUTPATIENT)
Dept: OPERATING ROOM | Age: 72
End: 2022-12-15
Payer: MEDICARE

## 2022-12-15 ENCOUNTER — HOSPITAL ENCOUNTER (OUTPATIENT)
Age: 72
Setting detail: OUTPATIENT SURGERY
Discharge: HOME | End: 2022-12-15
Attending: SURGERY | Admitting: SURGERY
Payer: MEDICARE

## 2022-12-15 ENCOUNTER — APPOINTMENT (OUTPATIENT)
Dept: MAMMOGRAPHY | Age: 72
End: 2022-12-15
Attending: SURGERY
Payer: MEDICARE

## 2022-12-15 ENCOUNTER — HOSPITAL ENCOUNTER (OUTPATIENT)
Dept: MAMMOGRAPHY | Age: 72
Setting detail: OUTPATIENT SURGERY
Discharge: HOME | End: 2022-12-15
Attending: SURGERY
Payer: MEDICARE

## 2022-12-15 VITALS
SYSTOLIC BLOOD PRESSURE: 145 MMHG | OXYGEN SATURATION: 94 % | HEIGHT: 66 IN | BODY MASS INDEX: 34.72 KG/M2 | TEMPERATURE: 98 F | HEART RATE: 84 BPM | WEIGHT: 216 LBS | DIASTOLIC BLOOD PRESSURE: 72 MMHG | RESPIRATION RATE: 18 BRPM

## 2022-12-15 DIAGNOSIS — C50.011 CARCINOMA OF AREOLA OF RIGHT BREAST IN FEMALE, ESTROGEN RECEPTOR POSITIVE (HCC): Primary | ICD-10-CM

## 2022-12-15 DIAGNOSIS — Z80.3 FAMILY HISTORY OF BREAST CANCER: ICD-10-CM

## 2022-12-15 DIAGNOSIS — R92.8 ABNORMAL MAMMOGRAM OF RIGHT BREAST: ICD-10-CM

## 2022-12-15 DIAGNOSIS — Z17.0 CARCINOMA OF AREOLA OF RIGHT BREAST IN FEMALE, ESTROGEN RECEPTOR POSITIVE (HCC): Primary | ICD-10-CM

## 2022-12-15 DIAGNOSIS — R92.8 ABNORMAL MAMMOGRAM: ICD-10-CM

## 2022-12-15 LAB — GLUCOSE BLD-MCNC: 110 MG/DL (ref 70–99)

## 2022-12-15 PROCEDURE — 7100000001 HC PACU RECOVERY - ADDTL 15 MIN: Performed by: SURGERY

## 2022-12-15 PROCEDURE — 3600000003 HC SURGERY LEVEL 3 BASE: Performed by: SURGERY

## 2022-12-15 PROCEDURE — 6360000002 HC RX W HCPCS: Performed by: NURSE ANESTHETIST, CERTIFIED REGISTERED

## 2022-12-15 PROCEDURE — 3430000000 HC RX DIAGNOSTIC RADIOPHARMACEUTICAL: Performed by: SURGERY

## 2022-12-15 PROCEDURE — 7100000010 HC PHASE II RECOVERY - FIRST 15 MIN: Performed by: SURGERY

## 2022-12-15 PROCEDURE — 19285 PERQ DEV BREAST 1ST US IMAG: CPT

## 2022-12-15 PROCEDURE — 6360000002 HC RX W HCPCS: Performed by: SURGERY

## 2022-12-15 PROCEDURE — 38900 IO MAP OF SENT LYMPH NODE: CPT | Performed by: SURGERY

## 2022-12-15 PROCEDURE — 3600000013 HC SURGERY LEVEL 3 ADDTL 15MIN: Performed by: SURGERY

## 2022-12-15 PROCEDURE — 19301 PARTIAL MASTECTOMY: CPT | Performed by: SURGERY

## 2022-12-15 PROCEDURE — 7100000000 HC PACU RECOVERY - FIRST 15 MIN: Performed by: SURGERY

## 2022-12-15 PROCEDURE — 3700000000 HC ANESTHESIA ATTENDED CARE: Performed by: SURGERY

## 2022-12-15 PROCEDURE — 82962 GLUCOSE BLOOD TEST: CPT

## 2022-12-15 PROCEDURE — 77065 DX MAMMO INCL CAD UNI: CPT

## 2022-12-15 PROCEDURE — A9520 TC99 TILMANOCEPT DIAG 0.5MCI: HCPCS | Performed by: SURGERY

## 2022-12-15 PROCEDURE — 88305 TISSUE EXAM BY PATHOLOGIST: CPT

## 2022-12-15 PROCEDURE — 76098 X-RAY EXAM SURGICAL SPECIMEN: CPT

## 2022-12-15 PROCEDURE — 7100000011 HC PHASE II RECOVERY - ADDTL 15 MIN: Performed by: SURGERY

## 2022-12-15 PROCEDURE — 38525 BIOPSY/REMOVAL LYMPH NODES: CPT | Performed by: SURGERY

## 2022-12-15 PROCEDURE — 2500000003 HC RX 250 WO HCPCS: Performed by: NURSE ANESTHETIST, CERTIFIED REGISTERED

## 2022-12-15 PROCEDURE — 88342 IMHCHEM/IMCYTCHM 1ST ANTB: CPT

## 2022-12-15 PROCEDURE — 19301 PARTIAL MASTECTOMY: CPT | Performed by: NURSE PRACTITIONER

## 2022-12-15 PROCEDURE — 2500000003 HC RX 250 WO HCPCS: Performed by: SURGERY

## 2022-12-15 PROCEDURE — 6370000000 HC RX 637 (ALT 250 FOR IP): Performed by: ANESTHESIOLOGY

## 2022-12-15 PROCEDURE — 2709999900 HC NON-CHARGEABLE SUPPLY: Performed by: SURGERY

## 2022-12-15 PROCEDURE — 3700000001 HC ADD 15 MINUTES (ANESTHESIA): Performed by: SURGERY

## 2022-12-15 PROCEDURE — 2580000003 HC RX 258: Performed by: ANESTHESIOLOGY

## 2022-12-15 PROCEDURE — 38525 BIOPSY/REMOVAL LYMPH NODES: CPT | Performed by: NURSE PRACTITIONER

## 2022-12-15 PROCEDURE — 2780000010 HC IMPLANT OTHER: Performed by: SURGERY

## 2022-12-15 PROCEDURE — 38792 RA TRACER ID OF SENTINL NODE: CPT

## 2022-12-15 PROCEDURE — 88307 TISSUE EXAM BY PATHOLOGIST: CPT

## 2022-12-15 RX ORDER — CEFAZOLIN SODIUM 1 G/3ML
INJECTION, POWDER, FOR SOLUTION INTRAMUSCULAR; INTRAVENOUS PRN
Status: DISCONTINUED | OUTPATIENT
Start: 2022-12-15 | End: 2022-12-15 | Stop reason: SDUPTHER

## 2022-12-15 RX ORDER — FENTANYL CITRATE 50 UG/ML
INJECTION, SOLUTION INTRAMUSCULAR; INTRAVENOUS PRN
Status: DISCONTINUED | OUTPATIENT
Start: 2022-12-15 | End: 2022-12-15 | Stop reason: SDUPTHER

## 2022-12-15 RX ORDER — PROPOFOL 10 MG/ML
INJECTION, EMULSION INTRAVENOUS PRN
Status: DISCONTINUED | OUTPATIENT
Start: 2022-12-15 | End: 2022-12-15 | Stop reason: SDUPTHER

## 2022-12-15 RX ORDER — OXYCODONE HYDROCHLORIDE 5 MG/1
10 TABLET ORAL
Status: DISCONTINUED | OUTPATIENT
Start: 2022-12-15 | End: 2022-12-15 | Stop reason: HOSPADM

## 2022-12-15 RX ORDER — KETOROLAC TROMETHAMINE 30 MG/ML
INJECTION, SOLUTION INTRAMUSCULAR; INTRAVENOUS PRN
Status: DISCONTINUED | OUTPATIENT
Start: 2022-12-15 | End: 2022-12-15 | Stop reason: SDUPTHER

## 2022-12-15 RX ORDER — LABETALOL HYDROCHLORIDE 5 MG/ML
10 INJECTION, SOLUTION INTRAVENOUS
Status: DISCONTINUED | OUTPATIENT
Start: 2022-12-15 | End: 2022-12-15 | Stop reason: HOSPADM

## 2022-12-15 RX ORDER — BUPIVACAINE HYDROCHLORIDE 2.5 MG/ML
INJECTION, SOLUTION EPIDURAL; INFILTRATION; INTRACAUDAL
Status: COMPLETED | OUTPATIENT
Start: 2022-12-15 | End: 2022-12-15

## 2022-12-15 RX ORDER — LIDOCAINE HYDROCHLORIDE 20 MG/ML
INJECTION, SOLUTION INFILTRATION; PERINEURAL PRN
Status: DISCONTINUED | OUTPATIENT
Start: 2022-12-15 | End: 2022-12-15 | Stop reason: SDUPTHER

## 2022-12-15 RX ORDER — SODIUM CHLORIDE, SODIUM LACTATE, POTASSIUM CHLORIDE, CALCIUM CHLORIDE 600; 310; 30; 20 MG/100ML; MG/100ML; MG/100ML; MG/100ML
INJECTION, SOLUTION INTRAVENOUS CONTINUOUS
Status: DISCONTINUED | OUTPATIENT
Start: 2022-12-15 | End: 2022-12-15 | Stop reason: HOSPADM

## 2022-12-15 RX ORDER — ONDANSETRON 2 MG/ML
4 INJECTION INTRAMUSCULAR; INTRAVENOUS
Status: DISCONTINUED | OUTPATIENT
Start: 2022-12-15 | End: 2022-12-15 | Stop reason: HOSPADM

## 2022-12-15 RX ORDER — DEXAMETHASONE SODIUM PHOSPHATE 4 MG/ML
INJECTION, SOLUTION INTRA-ARTICULAR; INTRALESIONAL; INTRAMUSCULAR; INTRAVENOUS; SOFT TISSUE PRN
Status: DISCONTINUED | OUTPATIENT
Start: 2022-12-15 | End: 2022-12-15 | Stop reason: SDUPTHER

## 2022-12-15 RX ORDER — SODIUM CHLORIDE 0.9 % (FLUSH) 0.9 %
5-40 SYRINGE (ML) INJECTION PRN
Status: DISCONTINUED | OUTPATIENT
Start: 2022-12-15 | End: 2022-12-15 | Stop reason: HOSPADM

## 2022-12-15 RX ORDER — HYDRALAZINE HYDROCHLORIDE 20 MG/ML
10 INJECTION INTRAMUSCULAR; INTRAVENOUS
Status: DISCONTINUED | OUTPATIENT
Start: 2022-12-15 | End: 2022-12-15 | Stop reason: HOSPADM

## 2022-12-15 RX ORDER — FENTANYL CITRATE 50 UG/ML
50 INJECTION, SOLUTION INTRAMUSCULAR; INTRAVENOUS EVERY 5 MIN PRN
Status: DISCONTINUED | OUTPATIENT
Start: 2022-12-15 | End: 2022-12-15 | Stop reason: HOSPADM

## 2022-12-15 RX ORDER — ONDANSETRON 2 MG/ML
INJECTION INTRAMUSCULAR; INTRAVENOUS PRN
Status: DISCONTINUED | OUTPATIENT
Start: 2022-12-15 | End: 2022-12-15 | Stop reason: SDUPTHER

## 2022-12-15 RX ORDER — FENTANYL CITRATE 50 UG/ML
25 INJECTION, SOLUTION INTRAMUSCULAR; INTRAVENOUS EVERY 5 MIN PRN
Status: DISCONTINUED | OUTPATIENT
Start: 2022-12-15 | End: 2022-12-15 | Stop reason: HOSPADM

## 2022-12-15 RX ORDER — SODIUM CHLORIDE 0.9 % (FLUSH) 0.9 %
5-40 SYRINGE (ML) INJECTION EVERY 12 HOURS SCHEDULED
Status: DISCONTINUED | OUTPATIENT
Start: 2022-12-15 | End: 2022-12-15 | Stop reason: HOSPADM

## 2022-12-15 RX ORDER — OXYCODONE HYDROCHLORIDE 5 MG/1
5 TABLET ORAL
Status: DISCONTINUED | OUTPATIENT
Start: 2022-12-15 | End: 2022-12-15 | Stop reason: HOSPADM

## 2022-12-15 RX ORDER — SODIUM CHLORIDE 9 MG/ML
25 INJECTION, SOLUTION INTRAVENOUS PRN
Status: DISCONTINUED | OUTPATIENT
Start: 2022-12-15 | End: 2022-12-15 | Stop reason: HOSPADM

## 2022-12-15 RX ORDER — HYDROCODONE BITARTRATE AND ACETAMINOPHEN 5; 325 MG/1; MG/1
1 TABLET ORAL EVERY 6 HOURS PRN
Qty: 28 TABLET | Refills: 0 | Status: SHIPPED | OUTPATIENT
Start: 2022-12-15 | End: 2022-12-22

## 2022-12-15 RX ORDER — SCOLOPAMINE TRANSDERMAL SYSTEM 1 MG/1
1 PATCH, EXTENDED RELEASE TRANSDERMAL
Status: DISCONTINUED | OUTPATIENT
Start: 2022-12-15 | End: 2022-12-15 | Stop reason: HOSPADM

## 2022-12-15 RX ORDER — ISOSULFAN BLUE 50 MG/5ML
INJECTION, SOLUTION SUBCUTANEOUS
Status: COMPLETED | OUTPATIENT
Start: 2022-12-15 | End: 2022-12-15

## 2022-12-15 RX ADMIN — LIDOCAINE HYDROCHLORIDE 100 MG: 20 INJECTION, SOLUTION INFILTRATION; PERINEURAL at 16:52

## 2022-12-15 RX ADMIN — FENTANYL CITRATE 25 MCG: 50 INJECTION, SOLUTION INTRAMUSCULAR; INTRAVENOUS at 17:20

## 2022-12-15 RX ADMIN — DEXAMETHASONE SODIUM PHOSPHATE 4 MG: 4 INJECTION, SOLUTION INTRAMUSCULAR; INTRAVENOUS at 17:00

## 2022-12-15 RX ADMIN — FENTANYL CITRATE 25 MCG: 50 INJECTION, SOLUTION INTRAMUSCULAR; INTRAVENOUS at 17:27

## 2022-12-15 RX ADMIN — FENTANYL CITRATE 25 MCG: 50 INJECTION, SOLUTION INTRAMUSCULAR; INTRAVENOUS at 17:00

## 2022-12-15 RX ADMIN — SODIUM CHLORIDE, POTASSIUM CHLORIDE, SODIUM LACTATE AND CALCIUM CHLORIDE: 600; 310; 30; 20 INJECTION, SOLUTION INTRAVENOUS at 09:33

## 2022-12-15 RX ADMIN — CEFAZOLIN 2 G: 1 INJECTION, POWDER, FOR SOLUTION INTRAMUSCULAR; INTRAVENOUS; PARENTERAL at 17:22

## 2022-12-15 RX ADMIN — FENTANYL CITRATE 50 MCG: 50 INJECTION, SOLUTION INTRAMUSCULAR; INTRAVENOUS at 18:51

## 2022-12-15 RX ADMIN — PROPOFOL 170 MG: 10 INJECTION, EMULSION INTRAVENOUS at 16:52

## 2022-12-15 RX ADMIN — KETOROLAC TROMETHAMINE 15 MG: 30 INJECTION, SOLUTION INTRAMUSCULAR; INTRAVENOUS at 18:24

## 2022-12-15 RX ADMIN — FENTANYL CITRATE 25 MCG: 50 INJECTION, SOLUTION INTRAMUSCULAR; INTRAVENOUS at 18:25

## 2022-12-15 RX ADMIN — ONDANSETRON 4 MG: 2 INJECTION INTRAMUSCULAR; INTRAVENOUS at 18:24

## 2022-12-15 RX ADMIN — FENTANYL CITRATE 25 MCG: 50 INJECTION, SOLUTION INTRAMUSCULAR; INTRAVENOUS at 17:56

## 2022-12-15 RX ADMIN — FENTANYL CITRATE 25 MCG: 50 INJECTION, SOLUTION INTRAMUSCULAR; INTRAVENOUS at 17:04

## 2022-12-15 RX ADMIN — TILMANOCEPT 0.58 MILLICURIE: KIT at 08:43

## 2022-12-15 ASSESSMENT — PAIN SCALES - GENERAL
PAINLEVEL_OUTOF10: 3
PAINLEVEL_OUTOF10: 3

## 2022-12-15 ASSESSMENT — ENCOUNTER SYMPTOMS
EYE REDNESS: 0
ABDOMINAL PAIN: 0
EYE DISCHARGE: 0
ABDOMINAL DISTENTION: 0
SORE THROAT: 0
COLOR CHANGE: 0
SHORTNESS OF BREATH: 1
CHEST TIGHTNESS: 0
VOMITING: 0
NAUSEA: 0

## 2022-12-15 ASSESSMENT — PAIN - FUNCTIONAL ASSESSMENT
PAIN_FUNCTIONAL_ASSESSMENT: 0-10
PAIN_FUNCTIONAL_ASSESSMENT: ACTIVITIES ARE NOT PREVENTED

## 2022-12-15 ASSESSMENT — PAIN DESCRIPTION - DESCRIPTORS
DESCRIPTORS: BURNING
DESCRIPTORS: SORE

## 2022-12-15 ASSESSMENT — PAIN DESCRIPTION - ORIENTATION
ORIENTATION: RIGHT
ORIENTATION: RIGHT

## 2022-12-15 ASSESSMENT — PAIN DESCRIPTION - FREQUENCY: FREQUENCY: INTERMITTENT

## 2022-12-15 ASSESSMENT — PAIN DESCRIPTION - LOCATION
LOCATION: BREAST
LOCATION: BREAST

## 2022-12-15 ASSESSMENT — PAIN DESCRIPTION - PAIN TYPE: TYPE: SURGICAL PAIN

## 2022-12-15 ASSESSMENT — PAIN DESCRIPTION - ONSET: ONSET: ON-GOING

## 2022-12-15 NOTE — OP NOTE
GENERAL SURGERY OPERATIVE NOTE  [facility]    PATIENT: Gerson Davila 1950   MRN: [id number]    DATE: 12/15/2022    SURGEON: Dariusz Luke MD    CASE ID: [id number]     PROCEDURE(S) PERFORMED:   RIGHT BREAST LUMPECTOMY PARTIAL MASTECTOMY WITH WIRE LOCALIZATION, SENTINEL LYMPH NODE BIOPSY    PREOPERATIVE DIAGNOSIS:  Carcinoma of areola of right breast in female, estrogen receptor positive ([facility] [C50.011, Z17.0]  Family history of breast cancer [Z80.3]    POSTOPERATIVE DIAGNOSIS:   same    INDICATIONS: Gerson Davila is a 67 y.o. female presenting with mucinous carcinoma of the right breast for which lumpectomy and sentinel lymph node biopsy has been discussed. The risks, benefits, potential complications and possible alternatives of the procedure were discussed in detail, including complications of but not limited to infection, bleeding, anesthesia-related complications, death, injury to surrounding structures, pain, poor healing or cosmesis, positive margins, seroma/hematoma, or need for additional interventions. All questions were answered. The patient wished to proceed and consent was documented in the medical record. FINDINGS:   Successful lumpectomy, wire in the specimen  [place] lymph node #1 - 1054  [place] lymph node #2 - 15 (blue dye, but no localization with gamma probe)    ANESTHESIA:   General endotracheal anesthesia  Anesthesiologist: Talya Cummings MD  CRNA: BREE Ventura CRNA    FIRST ASSISTANT:   MIREILLE Duke and The use of a First Assistant was necessary for the proper positioning, prepping, and draping of the patient, as well as the safe and expeditious execution of the case and closure of skin and subcutaneous tissues.      STAFF:   Scrub Person First: Willow Cowden    ESTIMATED BLOOD LOSS: Minimal    SPECIMEN(S):   ID Type Source Tests Collected by Time Destination   A : RIGHT LUMPECTOMY WITH WIRE AT 3 O'CLOCK Tissue Breast SURGICAL PATHOLOGY Lei Hollingsworth MD 12/15/2022 Thersa Na    B Nayeli Pitt LYMPH NODE #1 Tissue Lymph Node SURGICAL PATHOLOGY Lei Hollingsworth MD 12/15/2022 1735    C Nayeli Pitt LYMPH NODE #2 Tissue Lymph Node SURGICAL Bryce Tan MD 12/15/2022 1739        DRAINS & IMPLANTS:  * No implants in log *     COMPLICATIONS: none    DISPOSITION: PACU - hemodynamically stable. CONDITION: stable     PROCEDURE IN DETAIL:  Prior to beginning the procedure, informed consent was obtained and consent was documented in the medical record. Pre-op Technetium sulfur colloid was injected per radiology for the sentinel lymph node biopsy, and a localization needle wire was placed in radiology prior to the procedure. The patient was brought to the operating room and positioned supine on the operating table. Anesthesia was initiated and a time out was performed in which all were in agreement. Appropriate perioperative antibiotics were administered and the patient was prepped and draped in the usual sterile fashion. The anticipated lumpectomy incision was marked as well as the axillary incision site. Local anesthetic was used at the marked incision sites. Lymphazurin blue dye was injected subdermally in 4 quadrants around the areola, approximately 6ml was used in total.   The intraoperative Dinora Erik counter was used to identify the area of maximum activity in the right axilla. Activity over the injection site was noted to be >7,700. The area of maximum activity in the axilla was approximately 1054. This was carefully dissected out and sent as sentinel lymph node biopsy #1. Residual background activity in the axilla was approximately 15, but there was a separate lymphatic channel which contained blue dye and appeared to course to a lymph node, so a second lymph node biopsy specimen was also sent. After excision of the sentinel lymph nodes, activity in the axilla was <15.  The biopsy specimens were sent for permanent section. [place] Node Synoptic:  Operation performed with curative intent: Yes  Tracer(s) used to identify sentinel nodes in the upfront surgery (nonneoadjuvant) setting (select all that apply) : Dye and Radioactive tracer  Tracer(s) used to identify sentinel nodes in the neoadjuvant setting (select all that apply): Not Applicable  All nodes (colored or noncolored) present at the end of a dye-filled lymphatic channel were removed: Yes  All significantly radioactive nodes were removed: Yes  All palpably suspicious nodes were removed: Not Applicable  Biopsy-proven positive nodes marked with clips prior to chemotherapy were identified and removed: Not Applicable     An incision was then made at the medial aspect of the areolar border. A plane was developed between the subcutaneous tissues and the breast tissue and extended to the wire entry into the breast approximately 3cm from the areolar border at 3 o'clock. The course of the needle wire was visualized extending behind the areola, and tissue surrounding the tip of the guidewire was resected leaving at least 2cm of tissue surrounding the tip of the guidewire in each direction. The resected specimen was oriented with the localization needle at 3 o'clock), and sent for pathology. The margins were marked as follows: Anterior - green  Inferior - blue  Superior - red  Medial - yellow  Lateral - orange  Posterior - black      The wound was irrigated with sterile water and dried. It appeared hemostatic. A VeraForm surgical marker suture was placed in the lumpectomy bed, starting at the deep margin and progressing in a spiral fashion to the superficial border of the resection. The surgical sites were closed in layers with 3-0 Vicryl interrupted deep sutures to approximate the subcutaneous and breast tissues, then running 4-0 Vicryl subcuticular closure. The procedure was concluded. The skin was washed and dried and sterile wound sealant applied.  The patient tolerated the procedure well with no apparent complications and was transferred to PACU - hemodynamically stable. Counts were reported correct at the end of the case. I was present and primarily performed all critical portions of the case.         Electronically signed:   Chuck Jay MD 12/15/2022 6:41 PM

## 2022-12-15 NOTE — DISCHARGE INSTRUCTIONS
Postoperative Instructions: The following instructions will help you care for yourself or be cared for upon your return home. These are guidelines for your care after surgery only. Anesthesia Precautions & Expectations:  After anesthesia, rest for 24 hours. Do not drive, drink alcoholic beverages or make any important decisions during this time. Anesthesia may cause a sore throat, jaw discomfort or muscle aches. These symptoms can last for one or two days. You may feel tired for several days. Lifting:    No lifting more than 10 lbs for 2 weeks. May increase lifting amount by 10 lbs per week after 2 weeks. Diet:    After your anesthesia has worn off, increase your diet slowly to a regular diet  Avoid high sugar and fatty foods  Drink plenty of fluids--approx. 48-64 oz a day unless instructed to restrict fluids by your doctor    Medications: Take your medications as instructed  Do not drive after taking prescription pain medicine. Pain medications are meant to be taken as needed for postoperative pain. It is normal to have some pain or soreness after surgery. This will usually decrease over a few days. Gradually wean your use of these prescription medications and transition to over the counter medications. Narcotic pain medications can cause constipation. Take an over-the-counter stool softener such as Colace, MiraLax, or Milk of Magnesia daily according to  instructions while you're taking narcotic pain medications to help prevent constipation. Eat when taking prescription pain medication because they cause nausea on an empty stomach. Over-the-counter (OTC) pain medication  Advil/ibuprofen/Motrin, 200 mg tablets: you can take up to 2 tablets every 4-6 hours as needed. Do not use these if you have stomach problems like ulcers or if you are on blood thinners. You may also take Tylenol /Acetaminophen: 2 Extra-strength tablets every 4-6 hours as needed.   Caution: Percocet and Vicodin containTylenol /Acetaminophen. DO NOT TAKE MORE THAN 3000 mg total of Tylenol /Acetaminophen in 24 hours as this can cause liver damage. Lifestyle:  Do not smoke. Smoking will increase your risk for developing ulcers and other post-op complications, and impairs wound healing. Avoid alcohol  At least 30 minutes of continuous activity daily is encouraged. Walking is encouraged beginning a few hours after your surgery. Gradually increase the distance, length of time, and the number of times you walk. Climbing stairs is allowed. Being active helps to avoid blood clots in your legs and pneumonia. Incision care: You have sterile wound sealant covering your incision(s). It will peel off on its own in 1-2 weeks. It is ok to shower and pat the incision(s) dry afterwards. You may apply ice packs to your incision for pain control, 20 minutes on and 20 minutes off. Call the surgery office if you notice increasing redness or drainage from your incision(s), or if you start having fevers or increasing pain at your incision(s) that does not get better with medication      Call your surgeon ([phone]):  Persistent vomiting not related to overeating  Vomit looks bloody, black or like coffee grounds  Inability to keep anything down for >24 hours  Severe and worsening pain not improved with medication or rest  You do not have a bowel movement for > 3 days. Increased redness, swelling, bleeding or drainage at the incision site  Temperature above 101 degrees   Difficulty urinating            [facility]  [phone]    Do not drive, work around [address] [place], [place] [address]th  or use equipment. Do not drink any alcoholic beverages. Do not smoke while alone. Avoid making important decisions. Plan to spend a quiet, relaxed evening @ home. Resume normal activities as you begin to feel better. Eat lightly for your first meal, then gradually increase your diet to what is normal for you.   In case of nausea, avoid food and drink only clear liquids. Resume food as nausea ceases. Notify your surgeon if you experience fever, chills, large amount of bleeding, difficulty breathing, persistent nausea and vomiting or any other disturbing problem. Call for a follow-up appointment with your surgeon. Advance Care Planning  People with COVID-19 may have no symptoms, mild symptoms, such as fever, cough, and shortness of breath or they may have more severe illness, developing severe and fatal pneumonia. As a result, Advance Care Planning with attention to naming a health care decision maker (someone you trust to make healthcare decisions for you if you could not speak for yourself) and sharing other health care preferences is important BEFORE a possible health crisis. Please contact your Primary Care Provider to discuss Advance Care Planning. Preventing the Spread of Coronavirus Disease 2019 in Homes and Residential Communities  For the most recent information go to [email or web address]    Prevention steps for People with confirmed or suspected COVID-19 (including persons under investigation) who do not need to be hospitalized  and   People with confirmed COVID-19 who were hospitalized and determined to be medically stable to go home    Your healthcare provider and public health staff will evaluate whether you can be cared for at home. If it is determined that you do not need to be hospitalized and can be isolated at home, you will be monitored by staff from your local or state health department. You should follow the prevention steps below until a healthcare provider or local or state health department says you can return to your normal activities. Stay home except to get medical care  People who are mildly ill with COVID-19 are able to isolate at home during their illness. You should restrict activities outside your home, except for getting medical care.  Do not go to work, school, or public areas. Avoid using public transportation, ride-sharing, or taxis. Separate yourself from other people and animals in your home  People: As much as possible, you should stay in a specific room and away from other people in your home. Also, you should use a separate bathroom, if available. Animals: You should restrict contact with pets and other animals while you are sick with COVID-19, just like you would around other people. Although there have not been reports of pets or other animals becoming sick with COVID-19, it is still recommended that people sick with COVID-19 limit contact with animals until more information is known about the virus. When possible, have another member of your household care for your animals while you are sick. If you are sick with COVID-19, avoid contact with your pet, including petting, snuggling, being kissed or licked, and sharing food. If you must care for your pet or be around animals while you are sick, wash your hands before and after you interact with pets and wear a facemask. Call ahead before visiting your doctor  If you have a medical appointment, call the healthcare provider and tell them that you have or may have COVID-19. This will help the healthcare providers office take steps to keep other people from getting infected or exposed. Wear a facemask  You should wear a facemask when you are around other people (e.g., sharing a room or vehicle) or pets and before you enter a healthcare providers office. If you are not able to wear a facemask (for example, because it causes trouble breathing), then people who live with you should not stay in the same room with you, or they should wear a facemask if they enter your room. Cover your coughs and sneezes  Cover your mouth and nose with a tissue when you cough or sneeze. Throw used tissues in a lined trash can.  Immediately wash your hands with soap and water for at least 20 seconds or, if soap and water are not available, clean your hands with an alcohol-based hand  that contains at least 60% alcohol. Clean your hands often  Wash your hands often with soap and water for at least 20 seconds, especially after blowing your nose, coughing, or sneezing; going to the bathroom; and before eating or preparing food. If soap and water are not readily available, use an alcohol-based hand  with at least 60% alcohol, covering all surfaces of your hands and rubbing them together until they feel dry. Soap and water are the best option if hands are visibly dirty. Avoid touching your eyes, nose, and mouth with unwashed hands. Avoid sharing personal household items  You should not share dishes, drinking glasses, cups, eating utensils, towels, or bedding with other people or pets in your home. After using these items, they should be washed thoroughly with soap and water. Clean all high-touch surfaces everyday  High touch surfaces include counters, tabletops, doorknobs, bathroom fixtures, toilets, phones, keyboards, tablets, and bedside tables. Also, clean any surfaces that may have blood, stool, or body fluids on them. Use a household cleaning spray or wipe, according to the label instructions. Labels contain instructions for safe and effective use of the cleaning product including precautions you should take when applying the product, such as wearing gloves and making sure you have good ventilation during use of the product. Monitor your symptoms  Seek prompt medical attention if your illness is worsening (e.g., difficulty breathing). Before seeking care, call your healthcare provider and tell them that you have, or are being evaluated for, COVID-19. Put on a facemask before you enter the facility. These steps will help the healthcare providers office to keep other people in the office or waiting room from getting infected or exposed.  Ask your healthcare provider to call the local or [facility] department. Persons who are placed under active monitoring or facilitated self-monitoring should follow instructions provided by their local health department or occupational health professionals, as appropriate. When working with your local health department check their available hours. If you have a medical emergency and need to call 911, notify the dispatch personnel that you have, or are being evaluated for COVID-19. If possible, put on a facemask before emergency medical services arrive. Discontinuing home isolation  Patients with confirmed COVID-19 should remain under home isolation precautions until the risk of secondary transmission to others is thought to be low. The decision to discontinue home isolation precautions should be made on a case-by-case basis, in consultation with healthcare providers and state and local health departments.

## 2022-12-15 NOTE — ANESTHESIA POSTPROCEDURE EVALUATION
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, estrogen receptor positive ([address] [C50.011, Z17.0])      (Family history of breast cancer [Z80.3])    Surgeons: Bob Patino MD Responsible Provider: Natalie Botello MD    Anesthesia Type: General ASA Status: 3          Anesthesia Type: General    Jeniffer Phase I:      Jeniffer Phase II:        Anesthesia Post Evaluation    Patient location during evaluation: PACU  Patient participation: complete - patient participated  Level of consciousness: awake and alert  Pain score: 0  Airway patency: patent  Nausea & Vomiting: no vomiting and no nausea  Complications: no  Cardiovascular status: blood pressure returned to baseline and hemodynamically stable  Respiratory status: acceptable, spontaneous ventilation, nonlabored ventilation and nasal cannula  Hydration status: stable

## 2022-12-15 NOTE — BRIEF OP NOTE
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 female presenting with mucinous carcinoma of the right breast for which lumpectomy and sentinel lymph node biopsy has been discussed. The risks, benefits, potential complications and possible alternatives of the procedure were discussed in detail, including complications of but not limited to infection, bleeding, anesthesia-related complications, death, injury to surrounding structures, pain, poor healing or cosmesis, positive margins, seroma/hematoma, or need for additional interventions. All questions were answered. The patient wished to proceed and consent was documented in the medical record. FINDINGS:   Successful lumpectomy, wire in the specimen  [place] lymph node #1 - 1054  [place] lymph node #2 - 15 (blue dye, but no localization with gamma probe)    ANESTHESIA:   General endotracheal anesthesia  Anesthesiologist: Daniel Murray MD  CRNA: BREE Shearer CRNA    FIRST ASSISTANT:   MIREILLE Álvarez and The use of a First Assistant was necessary for the proper positioning, prepping, and draping of the patient, as well as the safe and expeditious execution of the case and closure of skin and subcutaneous tissues.      STAFF:   Scrub Person First: Abigail Woodard    ESTIMATED BLOOD LOSS: Minimal    SPECIMEN(S):   ID Type Source Tests Collected by Time Destination   A : RIGHT LUMPECTOMY WITH WIRE AT 3 O'CLOCK Tissue Breast SURGICAL Lisa Madison MD 12/15/2022 Danis Bonilla Fell LYMPH NODE #1 Tissue Lymph Node SURGICAL PATHOLOGY Winsome Brown MD 12/15/2022 1735    C Irene Fell LYMPH NODE #2 Tissue Lymph Node SURGICAL Lisa Madison MD 12/15/2022 1739        DRAINS & IMPLANTS:  * No implants in log *     COMPLICATIONS: none    DISPOSITION: PACU - hemodynamically stable.      CONDITION: stable     Electronically signed:   Omsar Dooley MD 12/15/2022 6:41 PM

## 2022-12-15 NOTE — H&P
GENERAL SURGERY NOTE - Outpatient History & Physical  [facility]    PATIENT: Cherelle Dural 1950, 67 y.o., female   Date: 12/15/2022  MRN: [id number]   Requesting Provider:  No ref. provider found History Obtained From:  patient, electronic medical record     Reason for Evaluation & Chief Complaint:    No chief complaint on file. Right breast cancer    HISTORY OF PRESENT ILLNESS:    Shira Tracey is a 67 y.o. female presenting for follow up of a new diagnosis of right breast cancer. Last visit she had a punch biopsy of a midline chest skin lesion which was a hemangioma on pathology. The area has been healing, had some reactive erythema, but no drainage. She had a CXR showing stable chronic findings. A genetic counseling visit is pending. 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 She has had 2 previous lumpectomies of the right breast but pathology was benign. Of note, she has had a brain tumor and has a family history of multiple types of cancer. Breast Concerns: no  Nipple Drainage or Retraction: yes: chronically inverted nipples (since even before sugery)  Skin Changes: no  Headaches: no  Vision Changes: no (cataract surgery)  Weight Change: no  Shortness of Breath or Chest Pain: yes: COPD, stable  Bone Pain: yes: arthritis  Lymphedema: no    Workup Includes:   Mammogram/US: 9/8/22, 9/22/22  Impression   Indeterminate 11 mm hypoechoic mass within the right retroareolar region   corresponding to the mammographic area of interest.  Given increasing size   mammographically would recommend further evaluation with ultrasound-guided   biopsy.        BIRADS:   BIRADS - CATEGORY 4B   Carcinoma on bx 10/13/22  DEXA: had one in the 1990's, was told to take calcium  Sozo: (not available, seen in [facility] today)     Sozo, L-Dex Measurements Date Interpretation -4.4 (initial/baseline) 11/1/2022  Initial/Baseline - WNL          Breast History, General  Endocrine Therapy: (none previously)  Oncotype/Genetic Testing: (none previously)  Chemotherapy: \"chemo pill to enhance radiation\" for her brain tumor  Estrogen/HRT: had a hysterectomy and ovaries removed, then took estrogen for ~10 years  Radiation/Environmental Exposure (eg mantle radiation): head radiation for her brain tumor (not of the chest)  Age of menarche: 14  Age at birth of first child: 23  Family History of breast cancer: yes:   - her son had genetic testing when he was diagnosed with the same kind of brain cancer - she isn't sure of the results of his testing  - younger sister - breast cancer (was on immunosuppression for a kidney transplant and then got breast cancer, about 45years old)  - maternal aunt with breast cancer in her [de-identified]  - older sister had bone cancer (and her son/pt's nephew had bone cancer)  - father had leukemia, passed from it  - sister with lung cancer    Right Breast History  Surgery, Date:   Excision/biopsy 3/2020 ([facility]) benign  1974 lumpectomy benign  Breast cancer: yes:    Type, Stage: mucinous carcinoma (Stage pending)   Size: (11mm on imaging)   Receptor Status: ER/TN positive and HER2 negative (HER2 FISH pending)   Node Status: (negative on US)    Radiation Therapy:   Abnormal Imaging: yes: 9/8/22, 9/22/22   Biopsies: yes: 10/13/22 mucinous carcinoma  Other Breast History   Infection: no   Skin lesion excised via punch biopsy 10/24/22 - hemangioma    Left Breast History  Surgery, Date:  Breast cancer: no   Abnormal Imaging: yes: remotely had lesions followed but never any biopsies   Biopsies: no  Other Breast History   Infection: no       Past Medical History:    Past Medical History:   Diagnosis Date    Abnormal mammogram 08/14/2017    0.4 cm hypoechoic mass(most likely a small intramammary lymph node or less likely a complicated cyst.  Follow-up in 6 months recommended with ultrasound    Attempted suicide [facility]) 2012    hanged herself. Brain neoplasm ([facility]     grade II [place] resected 4/13, Dr. Neo Hallman resected tumor. Dr. Viola Adam oncologist seeing pt also. No chemo. Dr. Neo Hallman following pt q year. Tumor recurred, right frontal craniotomy and tumor resection 11/14 Dr. Irizarry Call. Had radiation to the brain     Chronic obstructive pulmonary disease ([facility] 06/15/2016    PFT showed mod sees Dr Cloud 07/08/2022    positive home test    Cyst of left kidney 09/11/2014    MR I of 7/14. follow-up in 6-12 months    Cyst of pancreas 09/11/2014    Needs follow-up in one year 7/15    Depression     Family history of breast cancer in first degree relative 10/26/2022    Hearing loss     Hearing loss     Herniated disc     L4 &L5    Hyperlipidemia     Kidney stones     Liver dysfunction 06/06/2014    In 7/14 hepatitis ABC negative. Immune studies negative, iron studies normal.  MRI of liver mild fatty liver.  F/u LFTS normal.    Osteopenia 09/26/2014    Sarcoidosis     Skin cancer     nose       Past Surgical History:    Past Surgical History:   Procedure Laterality Date    BREAST BIOPSY Right 03/05/2020    BREAST LESION BIOPSY EXCISION NEEDLE LOCALIZATION MASS RIGHT performed by Jimbo Howard MD at [facility] Right 03/05/2020     [facility]    CATARACT REMOVAL Right 04/2022    CHOLECYSTECTOMY  2006    COLONOSCOPY  2008    f/u 5 years- Dr Rosario Broderick  04/2013    resection of brain tumor Dr. Papito Hermosillo ([address] [place] [address])      LITHOTRIPSY  2005    Miladis Méndez  2011    Dr Ilan Zhong Right 10/13/2022    US BREAST NEEDLE BIOPSY RIGHT 10/13/2022 [facility] ULTRASOUND    US GUIDED NEEDLE LOC OF RIGHT BREAST Right 12/15/2022    US GUIDED NEEDLE LOC OF RIGHT BREAST [facility] ULTRASOUND Current Medications:   Current Facility-Administered Medications   Medication Dose Route Frequency Provider Last Rate Last Admin    lactated ringers infusion   IntraVENous Continuous Kelle Monsalve MD 50 mL/hr at 12/15/22 0933 New Bag at 12/15/22 0933    scopolamine (TRANSDERM-SCOP) transdermal patch 1 patch  1 patch TransDERmal Q72H Kelle Monsalve MD   1 patch at 12/15/22 0935     Facility-Administered Medications Ordered in Other Encounters   Medication Dose Route Frequency Provider Last Rate Last Admin    technetium Tc 99m tilmanocept (LYMPHOSEEK) injection 3.45 millicurie  033 micro curie IntraDERmal ONCE PRN Lola Robertson MD   9.89 millicurie at  2175       Allergies:  Minocycline, Tetracyclines & related, Demeclocycline, and Tetracycline    Social History:   Social History     Socioeconomic History    Marital status:      Spouse name: None    Number of children: None    Years of education: None    Highest education level: None   Tobacco Use    Smoking status: Former     Packs/day: 0.75     Years: 20.00     Pack years: 15.00     Types: Cigarettes     Quit date: 2003     Years since quittin.3    Smokeless tobacco: Never   Vaping Use    Vaping Use: Never used   Substance and Sexual Activity    Alcohol use: No     Alcohol/week: 0.0 standard drinks    Drug use: No    Sexual activity: Yes     Partners: Male     Social Determinants of Health     Financial Resource Strain: Low Risk     Difficulty of Paying Living Expenses: Not hard at all   Food Insecurity: No Food Insecurity    Worried About Running Out of Food in the Last Year: Never true    Ran Out of Food in the Last Year: Never true       Family History:   Family History   Problem Relation Age of Onset    Heart Attack Mother     Cancer Father         leukemia    Breast Cancer Sister 45    Breast Cancer Maternal Aunt        REVIEW OF SYSTEMS:    Review of Systems   Constitutional:  Negative for chills and fever.    HENT: Negative for congestion and sore throat. Eyes:  Negative for discharge and redness. Respiratory:  Positive for shortness of breath (COPD). Negative for chest tightness. Cardiovascular:  Negative for chest pain and palpitations. Gastrointestinal:  Negative for abdominal distention, abdominal pain, nausea and vomiting. Genitourinary:  Negative for dysuria and flank pain. Musculoskeletal:  Positive for arthralgias. Negative for myalgias. Skin:  Negative for color change and rash. Neurological:  Negative for dizziness and numbness. Psychiatric/Behavioral:  Negative for confusion. The patient is not nervous/anxious. I have reviewed the patient's information pertinent to this visit, including medical history, family history, social history and review of systems. PHYSICAL EXAM:    Vitals:    12/08/22 0842 12/15/22 0908   BP:  138/69   Pulse:  68   Resp:  18   Temp:  97.5 °F (36.4 °C)   TempSrc:  Tympanic   SpO2:  96%   Weight: 217 lb (98.4 kg) 216 lb (98 kg)   Height: 5' 5.5\" (1.664 m) 5' 5.5\" (1.664 m)     Physical Exam  Constitutional:       General: She is not in acute distress. Appearance: She is well-developed. She is not diaphoretic. HENT:      Head: Normocephalic and atraumatic. Right Ear: External ear normal.      Left Ear: External ear normal.      Mouth/Throat:      Mouth: Mucous membranes are moist.   Eyes:      General:         Right eye: No discharge. Left eye: No discharge. Neck:      Trachea: No tracheal deviation. Cardiovascular:      Rate and Rhythm: Normal rate and regular rhythm. Pulmonary:      Effort: No respiratory distress. Breath sounds: No wheezing. Chest:   Breasts: Alec Score is 5. Right: Inverted nipple (slit like central nipple, flat to areola) present. No swelling, bleeding, nipple discharge, skin change or tenderness. Left: Inverted nipple (slit like central nipple, flat to areola) present.  No swelling, bleeding, mass, nipple discharge, skin change or tenderness. Abdominal:      General: There is no distension. Palpations: Abdomen is soft. Tenderness: There is no abdominal tenderness. Musculoskeletal:         General: No tenderness or deformity. Cervical back: Neck supple. Lymphadenopathy:      Upper Body:      Right upper body: No supraclavicular, axillary or pectoral adenopathy. Left upper body: No supraclavicular, axillary or pectoral adenopathy. Skin:     General: Skin is warm and dry. Findings: No rash. Neurological:      Mental Status: She is alert and oriented to person, place, and time. Psychiatric:         Behavior: Behavior normal.       DATA:    Pathology report 10/13/22  Preliminary Diagnosis   Right breast, retroareolar mass, needle core biopsy:   -     MUCINOUS CARCINOMA. 10/24/22  Specimen #[id number]   Final Pathologic Diagnosis:   Skin, upper midline chest, punch biopsy:   -     Hemangioma. Lab Results   Component Value Date    WBC 6.2 10/29/2021    HGB 14.5 10/29/2021    HCT 45.5 10/29/2021     10/29/2021     11/03/2022    K 4.1 11/03/2022     11/03/2022    CO2 23 11/03/2022    BUN 20 11/03/2022    CREATININE 0.8 11/03/2022    GLUCOSE 139 (H) 05/06/2022    CALCIUM 9.2 11/03/2022    PROT 7.3 11/03/2022    BILITOT 0.4 11/03/2022    AST 22 11/03/2022    ALT 39 11/03/2022    ALKPHOS 147 (H) 11/03/2022    INR 0.91 10/14/2012    GLUF 131 (H) 11/03/2022    LABA1C 6.3 11/03/2022       Imaging:   CHELSI POST BX CLIP PLACEMENT RIGHT    Addendum Date: 10/24/2022    ADDENDUM: Pathology results of the right retroareolar breast mass demonstrates mucinous carcinoma. Radiologic and pathologic findings are concordant. BIRADS - CATEGORY 6 Known Biopsy-Proven Cancer. Appropriate action should be taken. OVERALL ASSESSMENT - KNOWN BIOPSY PROVEN MALIGNANCY.      Result Date: 10/24/2022  EXAMINATION: ULTRASOUND-GUIDED RIGHT BREAST BIOPSY ULTRASOUND-GUIDED CLIP PLACEMENT POSTPROCEDURE DIGITAL MAMMOGRAM FOR CLIP PLACEMENT 10/13/2022 HISTORY: ORDERING SYSTEM PROVIDED HISTORY: S/P right breast biopsy COMPARISON: 09/22/2022 TECHNIQUE: A timeout was performed to confirm patient identification and site of procedure. Risks, benefits, and alternatives of the procedure were discussed. Informed written consent was obtained. Transverse and longitudinal gray scale images were obtained of the right retroareolar breast mass. The biopsy site was prepped in standard fashion. 1% lidocaine was used for local anesthesia and a small skin incision was made. A 12 gauge biopsy needle was advanced under sonographic guidance via a lateral approach. 3 cores were obtained and the needle was removed. A HydroMARK T4 Butterfly biopsy clip was placed at the biopsy site under ultrasound guidance. Pressure was applied for hemostasis. The patient tolerated the procedure well with no immediate complications. POSTPROCEDURE MAMMOGRAM FOR CLIP PLACEMENT: ML and CC views of the right breast were obtained immediately following the procedure. The biopsy clip is in the correct location with respect to the targeted site. There is no evidence of biopsy clip migration from the biopsy site. Post biopsy clip placement imaging performed in a separate room. Technically successful ultrasound guided core biopsy of right retroareolar breast mass and  clip marker placement as described above. BIRADS: ZW - Pathology pending. US BREAST BIOPSY W LOC DEVICE 1ST LESION RIGHT    Addendum Date: 10/24/2022    ADDENDUM: Pathology results of the right retroareolar breast mass demonstrates mucinous carcinoma. Radiologic and pathologic findings are concordant. BIRADS - CATEGORY 6 Known Biopsy-Proven Cancer. Appropriate action should be taken. OVERALL ASSESSMENT - KNOWN BIOPSY PROVEN MALIGNANCY.      Result Date: 10/24/2022  EXAMINATION: ULTRASOUND-GUIDED RIGHT BREAST BIOPSY ULTRASOUND-GUIDED CLIP PLACEMENT POSTPROCEDURE DIGITAL MAMMOGRAM FOR CLIP PLACEMENT 10/13/2022 HISTORY: ORDERING SYSTEM PROVIDED HISTORY: S/P right breast biopsy COMPARISON: 09/22/2022 TECHNIQUE: A timeout was performed to confirm patient identification and site of procedure. Risks, benefits, and alternatives of the procedure were discussed. Informed written consent was obtained. Transverse and longitudinal gray scale images were obtained of the right retroareolar breast mass. The biopsy site was prepped in standard fashion. 1% lidocaine was used for local anesthesia and a small skin incision was made. A 12 gauge biopsy needle was advanced under sonographic guidance via a lateral approach. 3 cores were obtained and the needle was removed. A HydroMARK T4 Butterfly biopsy clip was placed at the biopsy site under ultrasound guidance. Pressure was applied for hemostasis. The patient tolerated the procedure well with no immediate complications. POSTPROCEDURE MAMMOGRAM FOR CLIP PLACEMENT: ML and CC views of the right breast were obtained immediately following the procedure. The biopsy clip is in the correct location with respect to the targeted site. There is no evidence of biopsy clip migration from the biopsy site. Post biopsy clip placement imaging performed in a separate room. Technically successful ultrasound guided core biopsy of right retroareolar breast mass and  clip marker placement as described above. BIRADS: ZW - Pathology pending. Pertinent laboratory and imaging studies were personally reviewed if available. IMPRESSION:    Phillip Hodgson is a 67 y.o. female with new diagnosis right breast mucinous carcinoma    1. Abnormal mammogram    2.  Abnormal mammogram of right breast      Patient Active Problem List    Diagnosis Date Noted    Family history of breast cancer in first degree relative 10/26/2022    Carcinoma of areola of right breast in female, estrogen receptor positive ([facility] 10/18/2022    Controlled type 2 diabetes mellitus without complication, without long-term current use of insulin ([facility] 04/27/2020    Abnormal mammogram 08/14/2017    Chronic obstructive pulmonary disease ([facility] 06/15/2016    Cyst of left kidney 09/11/2014    Cyst of pancreas 09/11/2014    Liver dysfunction 06/06/2014    Mixed hyperlipidemia     Sarcoidosis     Depression     Hearing loss     Oligoastrocytoma, WHO grade 2 ([facility]      PLAN:  Discussed findings and options with Arrebaveronica Mathewjay. Mucinous carcinoma of the right breast - discussed cancer diagnosis and treatment options including breast conserving therapy, mastectomy w/wo reconstruction. Given her family history, she wishes to have a lumpectomy if genetic testing is negative, but would wish to consider bilateral mastectomies if genetic testing is positive for increased risk of breast cancer. Discussed with MIREILLE Jarvis, will expedite genetic testing to facilitate timely surgery. Will have my  hold a spot in the OR. Tentatively signed consent for lumpectomy, but will re-discuss if genetic testing shows increased risk. She expressed understanding and agreement with this plan. She follows with Dr. Tab Price, Oncologist at [place], will CC him on notes  Skin lesion of the midline chest - benign hemagioma. Sutures removed. COPD - CXR stable, no suspicion of metastatic disease  Possible history of osteopenia/osteoporosis (prior DEXA not available) - DEXA scan pending  Estrogen receptor positive breast cancer - will consider endocrine therapy. She is postmenopausal. Check DEXA  Family history of breast cancer and multiple other cancers - referral placed to MIREILLE Jarvis for genetic evaluation. The patient feels this would potentially change her preference for breast conservation vs mastectomy, so will await results before finalizing surgery, but will hold an OR date to make her care as timely as possible.   Diabetes - controlled, last A1c 6.3 on 5/4/22  Planned procedure: right breast lumpectomy (needle or seed localization) with sentinel lymph node biopsy, and possible mastopexy (crescent or doughnut)  The risks, benefits, potential complications and possible alternatives of the procedure were discussed in detail, including complications of but not limited to infection, bleeding, anesthesia-related complications, death, poor healing or cosmesis, positive margins, recurrent cancer, seroma/hematoma/fluid collection, neurovascular injury, lymphedema, or need for additional interventions. All questions were answered. The patient wished to proceed and consent was documented in the medical record. Informed consent: obtained   Anticipated status: Outpatient  Antic[facility]ated location[facility]  [facility]   Follow Up: No follow-ups on file.     Orders Placed This Encounter   Procedures    CHELSI POST BX CLIP PLACEMENT RIGHT    CHELSI BREAST SPECIMEN    NM INJ LYMPHOSCINTIGRAM    US PLACE BREAST LOC DEVICE 1ST LESION RIGHT    POCT Glucose    POCT Glucose        Orders Placed This Encounter   Medications    lactated ringers infusion    scopolamine (TRANSDERM-SCOP) transdermal patch 1 patch         Electronically signed by Kassidy Starks MD, 12/15/2022, 4:10 PM

## 2022-12-16 NOTE — PROGRESS NOTES
1847: Pt arrived to PACU from OR. Monitors applied, alarms on. Surgical sites clean and dry. PACU report obtained from Roslyn Epstein and [address].  4424: Pt repositioned in bed. 1910: Pt tolerating ice chips at this time. 1922: Phase 1 care complete. Pt transferred to Phase II.  1940: Pt IV removed, pt dressed and  brought back to see pt. Discharge instructions given,  and pt state understanding. Signature obtained from  Tara Lynn at this time. 1946: Pt discharged via wheelchair with all belongings.

## 2022-12-28 ENCOUNTER — OFFICE VISIT (OUTPATIENT)
Dept: SURGERY | Age: 72
End: 2022-12-28

## 2022-12-28 VITALS
SYSTOLIC BLOOD PRESSURE: 126 MMHG | HEART RATE: 82 BPM | WEIGHT: 217 LBS | BODY MASS INDEX: 34.87 KG/M2 | HEIGHT: 66 IN | DIASTOLIC BLOOD PRESSURE: 78 MMHG | OXYGEN SATURATION: 99 %

## 2022-12-28 DIAGNOSIS — Z17.0 CARCINOMA OF AREOLA OF RIGHT BREAST IN FEMALE, ESTROGEN RECEPTOR POSITIVE (HCC): Primary | ICD-10-CM

## 2022-12-28 DIAGNOSIS — C50.011 CARCINOMA OF AREOLA OF RIGHT BREAST IN FEMALE, ESTROGEN RECEPTOR POSITIVE (HCC): Primary | ICD-10-CM

## 2022-12-28 PROCEDURE — 99024 POSTOP FOLLOW-UP VISIT: CPT | Performed by: SURGERY

## 2023-01-09 NOTE — PROGRESS NOTES
Chief Complaint   Patient presents with    Post-Op Check     1st p/o rt breast lump w/ seed/wire loc 12/15/22          SUBJECTIVE:  Patient here for post op visit. Patient underwent right breast lumpectomy/sentinel lymph node biopsy with Dr. Evelyn Bautista. Incisions healing well. Pathology reviewed today. Patient to follow-up with oncology. No new complaints today. Past Surgical History:   Procedure Laterality Date    BREAST BIOPSY Right 03/05/2020    BREAST LESION BIOPSY EXCISION NEEDLE LOCALIZATION MASS RIGHT performed by Figueroa Newman MD at [address] [facility] LUMPECTOMY Right 12/15/2022    RIGHT BREAST LUMPECTOMY PARTIAL MASTECTOMY WITH  WIRE LOCALIZATION, SENTINEL LYMPH NODE BIOPSY performed by Lion Yang MD at [address] [facility] Right 03/05/2020     right breast mass removal by Dr. Brenda Lozada in [place] [address] Right 02/2022    CATARACT REMOVAL Right 04/2022    CHOLECYSTECTOMY  2006    COLONOSCOPY  2008    f/u 5 years- Dr Winnie Palma  04/2013    resection of brain tumor Dr. Elia Ojeda ([address] [place] [address])      LITHOTRIPSY  2005    Christiana Cooper  2011    Dr Rangel Avendano Right 10/13/2022    US BREAST NEEDLE BIOPSY RIGHT 10/13/2022 [facility] ULTRASOUND    US GUIDED NEEDLE LOC OF RIGHT BREAST Right 12/15/2022    US GUIDED NEEDLE LOC OF RIGHT BREAST [facility] ULTRASOUND     Past Medical History:   Diagnosis Date    Abnormal mammogram 08/14/2017    0.4 cm hypoechoic mass(most likely a small intramammary lymph node or less likely a complicated cyst.  Follow-up in 6 months recommended with ultrasound    Attempted suicide ([address] 2012    hanged herself. Brain neoplasm ([address]     grade II Oligo resected 4/13, Dr. Litzy Mendez resected tumor. Dr. Maryellen Ramos oncologist seeing pt also. No chemo. Dr. Litzy Mendez following pt q year.  Tumor recurred, right frontal craniotomy and tumor resection  Dr. Jasson Rojas. Had radiation to the brain     Chronic obstructive pulmonary disease ([facility] 06/15/2016    PFT showed mod sees Dr Richard Meza 2022    positive home test    Cyst of left kidney 2014    MR I of . follow-up in 6-12 months    Cyst of pancreas 2014    Needs follow-up in one year 7/15    Depression     Family history of breast cancer in first degree relative 10/26/2022    Hearing loss     Hearing loss     Herniated disc     L4 &L5    Hyperlipidemia     Kidney stones     Liver dysfunction 2014    In  hepatitis ABC negative. Immune studies negative, iron studies normal.  MRI of liver mild fatty liver.  F/u LFTS normal.    Osteopenia 2014    Sarcoidosis     Skin cancer     nose     Family History   Problem Relation Age of Onset    Heart Attack Mother     Cancer Father         leukemia    Breast Cancer Sister 45    Breast Cancer Maternal Aunt      Social History     Socioeconomic History    Marital status:      Spouse name: Not on file    Number of children: Not on file    Years of education: Not on file    Highest education level: Not on file   Occupational History    Not on file   Tobacco Use    Smoking status: Former     Packs/day: 0.75     Years: 20.00     Pack years: 15.00     Types: Cigarettes     Quit date: 2003     Years since quittin.3    Smokeless tobacco: Never   Vaping Use    Vaping Use: Never used   Substance and Sexual Activity    Alcohol use: No     Alcohol/week: 0.0 standard drinks    Drug use: No    Sexual activity: Yes     Partners: Male   Other Topics Concern    Not on file   Social History Narrative    Not on file     Social Determinants of Health     Financial Resource Strain: Low Risk     Difficulty of Paying Living Expenses: Not hard at all   Food Insecurity: No Food Insecurity    Worried About Running Out of Food in the Last Year: Never true    [address] in the Last Year: Never true   Transportation Needs: Not on file   Physical Activity: Not on file   Stress: Not on file   Social Connections: Not on file   Intimate Partner Violence: Not on file   Housing Stability: Not on file       OBJECTIVE:  Chaperone present for exam  General: A&O x3  Respiratory: Chest rise equal bilaterally  CV: Regular rate and rhythm  Abdomen: Soft, nontender, nondistended, no rebound or guarding. Right breast incision/right axillary incision clean/dry/intact healing well. Path reveals:   A. Breast lumpectomy specimen, right:        INVASIVE MUCINOUS CARCINOMA. Cavity with features of prior biopsy site. Papillary hyperplasia. Ductal hyperplasia. Fibrocystic change with usual ductal hyperplasia. B.  Lymph node, sentinel node #1:  Reactive hyperplasia with   sinus histiocytosis. C.  Biopsy of fibroadipose tissue, clinically sentinel lymph   node #2:          No histopathological abnormality. ASSESSMENT:    1. Carcinoma of areola of right breast in female, estrogen receptor positive ([facility]      That is post right breast lumpectomy/SN LB    PLAN:    Pathology reviewed. Incisions healing well. Follow-up with oncology. Patient to follow with Dr. Kylah Felix. No orders of the defined types were placed in this encounter. No orders of the defined types were placed in this encounter. Follow Up: No follow-ups on file.     Alexandrea Alfaro DO

## 2023-01-17 ENCOUNTER — OFFICE VISIT (OUTPATIENT)
Dept: SURGERY | Age: 73
End: 2023-01-17

## 2023-01-17 VITALS — BODY MASS INDEX: 34.65 KG/M2 | HEART RATE: 82 BPM | OXYGEN SATURATION: 99 % | WEIGHT: 215.6 LBS | HEIGHT: 66 IN

## 2023-01-17 DIAGNOSIS — J44.9 CHRONIC OBSTRUCTIVE PULMONARY DISEASE, UNSPECIFIED COPD TYPE (HCC): ICD-10-CM

## 2023-01-17 DIAGNOSIS — C50.011 CARCINOMA OF AREOLA OF RIGHT BREAST IN FEMALE, ESTROGEN RECEPTOR POSITIVE (HCC): ICD-10-CM

## 2023-01-17 DIAGNOSIS — Z17.0 CARCINOMA OF AREOLA OF RIGHT BREAST IN FEMALE, ESTROGEN RECEPTOR POSITIVE (HCC): ICD-10-CM

## 2023-01-17 DIAGNOSIS — C50.911 MUCINOUS CARCINOMA OF BREAST, RIGHT (HCC): Primary | ICD-10-CM

## 2023-01-17 PROCEDURE — 99024 POSTOP FOLLOW-UP VISIT: CPT | Performed by: SURGERY

## 2023-01-17 ASSESSMENT — PATIENT HEALTH QUESTIONNAIRE - PHQ9
SUM OF ALL RESPONSES TO PHQ QUESTIONS 1-9: 0
SUM OF ALL RESPONSES TO PHQ QUESTIONS 1-9: 0
1. LITTLE INTEREST OR PLEASURE IN DOING THINGS: 0
SUM OF ALL RESPONSES TO PHQ QUESTIONS 1-9: 0
SUM OF ALL RESPONSES TO PHQ9 QUESTIONS 1 & 2: 0
2. FEELING DOWN, DEPRESSED OR HOPELESS: 0
SUM OF ALL RESPONSES TO PHQ QUESTIONS 1-9: 0

## 2023-01-17 NOTE — PROGRESS NOTES
GENERAL SURGERY NOTE - Outpatient Progress  [facility]) Physicians    PATIENT: Alia Vergara 1950, 67 y.o., female   Date: 1/17/2023  MRN: [id number]   Requesting Provider:   No ref. provider found History Obtained From:  patient, electronic medical record     Reason for Evaluation & Chief Complaint:    Chief Complaint   Patient presents with    Post-Op Check     2nd PO RT Breast lumpecttomy with seed or wire LOC SLN BX Partial Mastopecy Jasmeetsaschadionne @ [facility] 12/15/22       HISTORY OF PRESENT ILLNESS:      Erwin Castellanos is a 67 y.o. female presenting postoperatively after RIGHT BREAST LUMPECTOMY PARTIAL MASTECTOMY WITH WIRE LOCALIZATION, SENTINEL LYMPH NODE BIOPSY. Since the procedure, she has been doing very well. Her oncologist from [facility] has left the practice, and she would like to establish care locally. Her genetic testing was negative. Eating a regular diet without difficulty. Bowel movement are Normal.    Pain is controlled without any medications. Sore, but improving. Wounds/Incisions: are healing well. No drainage or erythema. Denies lymphedema or swelling (no Sozo today)  Sozo, L-Dex Measurements Date Interpretation   -4.4 (initial/baseline) 11/1/2022  Initial/Baseline - WNL             Past Medical History:    Past Medical History:   Diagnosis Date    Abnormal mammogram 08/14/2017    0.4 cm hypoechoic mass(most likely a small intramammary lymph node or less likely a complicated cyst.  Follow-up in 6 months recommended with ultrasound    Attempted suicide ([address] 2012    hanged herself. Brain neoplasm ([address]     grade II Oligo resected 4/13, Dr. Noel Blanco resected tumor. Dr. Heike Bean oncologist seeing pt also. No chemo. Dr. Noel Blanco following pt q year. Tumor recurred, right frontal craniotomy and tumor resection 11/14 Dr. Meredith Palmer.   Had radiation to the brain     Chronic obstructive pulmonary disease ([address] 06/15/2016    PFT showed mod sees Dr Kali Bartholomew 07/08/2022    positive home test    Cyst of left kidney 09/11/2014    MR I of 7/14. follow-up in 6-12 months    Cyst of pancreas 09/11/2014    Needs follow-up in one year 7/15    Depression     Family history of breast cancer in first degree relative 10/26/2022    Hearing loss     Hearing loss     Herniated disc     L4 &L5    Hyperlipidemia     Kidney stones     Liver dysfunction 06/06/2014    In 7/14 hepatitis ABC negative. Immune studies negative, iron studies normal.  MRI of liver mild fatty liver.  F/u LFTS normal.    Osteopenia 09/26/2014    Sarcoidosis     Skin cancer     nose       Past Surgical History:    Past Surgical History:   Procedure Laterality Date    BREAST BIOPSY Right 03/05/2020    BREAST LESION BIOPSY EXCISION NEEDLE LOCALIZATION MASS RIGHT performed by Leonardo Yancey MD at [address] [facility] LUMPECTOMY Right 12/15/2022    RIGHT BREAST LUMPECTOMY PARTIAL MASTECTOMY WITH  WIRE LOCALIZATION, SENTINEL LYMPH NODE BIOPSY performed by Tim Rosales MD at [address] Right 03/05/2020     [facility] Right 02/2022    CATARACT REMOVAL Right 04/2022    CHOLECYSTECTOMY  2006    COLONOSCOPY  2008    f/u 5 years- Dr Shaggy Renee  04/2013    resection of brain tumor Dr. Ayah Estrada ([facility])      LITHOTRIPSY  2005    Sharon Garcia  2011    Dr Kong Abdullahi Right 10/13/2022    US BREAST NEEDLE BIOPSY RIGHT 10/13/2022 [facility] ULTRASOUND    US GUIDED NEEDLE LOC OF RIGHT BREAST Right 12/15/2022    US GUIDED NEEDLE LOC OF RIGHT BREAST [facility] ULTRASOUND       Current Medications:   Current Outpatient Medications   Medication Sig Dispense Refill    atorvastatin (LIPITOR) 40 MG tablet Take 1 tablet by mouth daily 90 tablet 1    traZODone (DESYREL) 50 MG tablet TAKE ONE-HALF TABLET BY MOUTH NIGHTLY 30 tablet 1 umeclidinium-vilanterol (ANORO ELLIPTA) 62.5-25 MCG/INH AEPB inhaler 1 puff daily 1 each 5    escitalopram (LEXAPRO) 20 MG tablet Take 20 mg by mouth in the morning. Multiple Vitamin (MULTIVITAMIN ADULT PO) Take 1 tablet by mouth daily (with breakfast)      metFORMIN (GLUCOPHAGE-XR) 500 MG extended release tablet Take 1 tablet by mouth daily (with breakfast) 90 tablet 1    Calcium Carbonate (CALCIUM 600 PO) Take 600 mg by mouth 2 times daily      vitamin D3 (CHOLECALCIFEROL) 25 MCG (1000 UT) TABS tablet Take 2 tablets by mouth daily 30 tablet 5    blood glucose test strips (TRUE METRIX BLOOD GLUCOSE TEST) strip Testing once daily, DX=E11.9 50 strip 5    Lancet Device MISC 1 Device by Does not apply route 2 times daily DX: E11.9      Lancets Ultra Thin 23P MISC 1 applicator by Does not apply route 2 times daily 100 each 5    UNABLE TO FIND Please dispense what insurance will cover, blood glucose test strips, DX=E11.9, testing once daily and prn 60 strip 2    rOPINIRole (REQUIP) 0.5 MG tablet Take 1 tablet by mouth in the morning. (Patient not taking: Reported on 2022) 90 tablet 5    albuterol sulfate HFA (PROVENTIL;VENTOLIN;PROAIR) 108 (90 Base) MCG/ACT inhaler Inhale 2 puffs into the lungs every 4 hours as needed for Wheezing 1 each 3     No current facility-administered medications for this visit.        Allergies:  Minocycline, Tetracyclines & related, Demeclocycline, and Tetracycline    Social History:   Social History     Socioeconomic History    Marital status:      Spouse name: None    Number of children: None    Years of education: None    Highest education level: None   Tobacco Use    Smoking status: Former     Packs/day: 0.75     Years: 20.00     Pack years: 15.00     Types: Cigarettes     Quit date: 2003     Years since quittin.4    Smokeless tobacco: Never   Vaping Use    Vaping Use: Never used   Substance and Sexual Activity    Alcohol use: No     Alcohol/week: 0.0 standard drinks    Drug use: No    Sexual activity: Yes     Partners: Male     Social Determinants of Health     Financial Resource Strain: Low Risk     Difficulty of Paying Living Expenses: Not hard at all   Food Insecurity: No Food Insecurity    Worried About Running Out of Food in the Last Year: Never true    Ran Out of Food in the Last Year: Never true       Family History:   Family History   Problem Relation Age of Onset    Heart Attack Mother     Cancer Father         leukemia    Breast Cancer Sister 45    Breast Cancer Maternal Aunt        REVIEW OF SYSTEMS:    Review of Systems   Constitutional:  Negative for chills and fever. Respiratory:  Negative for shortness of breath. Cardiovascular:  Negative for chest pain. Gastrointestinal:  Negative for abdominal pain, nausea and vomiting. I have reviewed the patient's information pertinent to this visit, including medical history, family history, social history and review of systems. PHYSICAL EXAM:    Vitals:    01/17/23 1500   Pulse: 82   SpO2: 99%   Weight: 215 lb 9.6 oz (97.8 kg)   Height: 5' 5.5\" (1.664 m)       Physical Exam  Constitutional:       General: She is not in acute distress. Appearance: She is well-developed. She is not diaphoretic. HENT:      Head: Normocephalic and atraumatic. Right Ear: External ear normal.      Left Ear: External ear normal.      Mouth/Throat:      Mouth: Mucous membranes are moist.   Eyes:      General:         Right eye: No discharge. Left eye: No discharge. Neck:      Trachea: No tracheal deviation. Cardiovascular:      Rate and Rhythm: Normal rate and regular rhythm. Pulmonary:      Effort: Pulmonary effort is normal. No respiratory distress. Breath sounds: No wheezing. Chest:   Breasts: Alec Score is 5. Right: Tenderness (appropriately tender at incisions) present. No swelling, bleeding, inverted nipple, mass, nipple discharge or skin change.       Left: No swelling, bleeding, inverted nipple, mass, nipple discharge, skin change or tenderness. Abdominal:      General: There is no distension. Palpations: Abdomen is soft. Tenderness: There is no abdominal tenderness. Musculoskeletal:         General: No tenderness or deformity. Cervical back: Neck supple. Lymphadenopathy:      Upper Body:      Right upper body: No supraclavicular, axillary or pectoral adenopathy. Left upper body: No supraclavicular, axillary or pectoral adenopathy. Skin:     General: Skin is warm and dry. Findings: No rash. Neurological:      Mental Status: She is alert and oriented to person, place, and time. Psychiatric:         Behavior: Behavior normal.       DATA:    Pathology Results:   Specimen #[id number]     Final Pathologic Diagnosis:   A. Breast lumpectomy specimen, right:        INVASIVE MUCINOUS CARCINOMA. Cavity with features of prior biopsy site. Papillary hyperplasia. Ductal hyperplasia. Fibrocystic change with usual ductal hyperplasia. B.  Lymph node, sentinel node #1:  Reactive hyperplasia with   sinus histiocytosis. C.  Biopsy of fibroadipose tissue, clinically sentinel lymph   node #2:          No histopathological abnormality. Electronically Signed Out By Nae Contreras MD   Comment:     INVASIVE CARCINOMA OF THE BREAST - SUMMARY     Procedure:  Right breast lumpectomy with sentinel lymph   node. Laterality and tumor site:  Right breast retroareolar. Prior Specimen:  Needle biopsy, [id number]. Tumor Focality:  Single. Tumor Size:  18 x 8 x 2 mm. Histologic Type: Invasive mucinous carcinoma. Histologic Grade (Roland Histologic Score): Grade 1        - Tubular score 2, Nuclear score 2, Mitotic score 1   (0/10 hpf)     Ductal Carcinoma In Situ:  Not present. Lobular Carcinoma In Situ (LCIS):  Not present. Tumor Extension:          - Skin:  No specimen.        - Nipple:  No specimen. - Skeletal Muscle:  No specimen but not suspected. Margins:          - Invasive carcinoma margins: Margins uninvolved. Distance from closest margin: 4 mm, anterior margin            - DCIS Margins: N/A     Regional Lymph Nodes: Uninvolved by tumor cells        - Number of Lymph Nodes Examined: (1 sentinel, 0   nonsentinel)        - Number of Lymph Nodes with Isolated Tumor Cells (d0.2   mm and d200 cells#: 0     Treatment Effect:        - Treatment Effect in the Breast:  None seen. - Treatment Effect in the Lymph Nodes:  None seen. Lymphovascular Invasion:  None seen. Dermal Lymphovascular Invasion:  No specimen. Microcalcifications:  None seen. Ancillary Studies ([id number]):     -ER by IHC: Positive (>95%, strong)   -PgR by IHC: Positive (90%, strong)   -Her2 by IHC: Negative (score 0)   -Her2 by FISH: Negative   -Avg. #HER2 signals/nucleus: 1.84, Avg. #CEP17   signals/nucleus: 1.94, HER2/CEP17 ratio: 0.95       - Ki67 proliferation index: 10-15%. Additional Pathologic Findings:  See above. Pathologic Stage Classification: pT1c, pN0(sn,i-), pM-N/A     Labs:  Lab Results   Component Value Date    WBC 6.2 10/29/2021    HGB 14.5 10/29/2021    HCT 45.5 10/29/2021     10/29/2021     11/03/2022    K 4.1 11/03/2022     11/03/2022    CO2 23 11/03/2022    BUN 20 11/03/2022    CREATININE 0.8 11/03/2022    GLUCOSE 139 (H) 05/06/2022    CALCIUM 9.2 11/03/2022    PROT 7.3 11/03/2022    BILITOT 0.4 11/03/2022    AST 22 11/03/2022    ALT 39 11/03/2022    ALKPHOS 147 (H) 11/03/2022    INR 0.91 10/14/2012    GLUF 131 (H) 11/03/2022    LABA1C 6.3 11/03/2022       Imaging:   No results found. Pertinent laboratory and imaging studies were personally reviewed if available.       IMPRESSION:    Chris Nava is a 67 y.o. female with mucinous carcinoma of the right breast, following-up postoperatively from RIGHT BREAST LUMPECTOMY PARTIAL MASTECTOMY WITH WIRE LOCALIZATION, SENTINEL LYMPH NODE BIOPSY 12/15/22. Visit Diagnoses:  1. Mucinous carcinoma of breast, right ([facility]    2. Chronic obstructive pulmonary disease, unspecified COPD type ([address]    3. Carcinoma of areola of right breast in female, estrogen receptor positive [facility])        Patient Active Problem List    Diagnosis Date Noted    Family history of breast cancer in first degree relative 10/26/2022    Carcinoma of areola of right breast in female, estrogen receptor positive ([facility] 10/18/2022    Controlled type 2 diabetes mellitus without complication, without long-term current use of insulin ([address] 04/27/2020    Abnormal mammogram 08/14/2017    Chronic obstructive pulmonary disease ([address] 06/15/2016    Cyst of left kidney 09/11/2014    Cyst of pancreas 09/11/2014    Liver dysfunction 06/06/2014    Mixed hyperlipidemia     Sarcoidosis     Depression     Hearing loss     Oligoastrocytoma, WHO grade 2 ([facility]        PLAN:  Continue current care  Increase activity as tolerated  Next mmg 9/2023 (will order at upcoming visit)  Her oncologist from [place] has left the practice, and she would like to establish care locally. Will place a referral.   S/p lumpectomy, will refer for Radiation Oncology  DEXA 11/2022 with osteopenia - continue calcium and vitamin D, weight bearing exercise as tolerated. Next DEXA 11/2024  Follow Up: Return in about 3 months (around 4/17/2023) for breast cancer surveillance. Orders Placed This Encounter   Procedures    [facility], Ronen Curtis MD, Radiation Oncology, [place]      No orders of the defined types were placed in this encounter.       Electronically signed by French Hendricks MD, 1/19/2023, 11:23 AM.

## 2023-01-19 ASSESSMENT — ENCOUNTER SYMPTOMS
ABDOMINAL PAIN: 0
NAUSEA: 0
VOMITING: 0
SHORTNESS OF BREATH: 0

## 2023-01-26 ENCOUNTER — HOSPITAL ENCOUNTER (OUTPATIENT)
Dept: RADIATION ONCOLOGY | Age: 73
Discharge: HOME | End: 2023-01-26
Payer: MEDICARE

## 2023-01-26 VITALS
OXYGEN SATURATION: 92 % | SYSTOLIC BLOOD PRESSURE: 161 MMHG | TEMPERATURE: 97.9 F | HEIGHT: 65 IN | BODY MASS INDEX: 36.25 KG/M2 | HEART RATE: 85 BPM | DIASTOLIC BLOOD PRESSURE: 70 MMHG | WEIGHT: 217.6 LBS

## 2023-01-26 PROCEDURE — 99213 OFFICE O/P EST LOW 20 MIN: CPT

## 2023-01-26 PROCEDURE — 77263 THER RADIOLOGY TX PLNG CPLX: CPT | Performed by: RADIOLOGY

## 2023-01-26 NOTE — CONSULTS
Radiation Oncology Consultation  Encounter Date: 2023 3:17 PM    Ms. Cecilia Beck is a 67 y.o. female  : 1950  MRN: [id number]  [facility] Number: [de-identified]  Requesting Provider: No att. providers found        CONSULTANT: Ladonna Lazaro MD    PHYSICIANS:   Primary Care: Hilda Ryan MD   Surgeon: Dr. 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 Deana Trujillo is a 79-year-old female who presents for consultation of radiotherapy treatment options the above diagnosis. She had a screening mammogram on 2022 which showed increasing size of 1.2 cm ovoid right retroareolar breast mass and further evaluation was recommended. Targeted ultrasound of the retroareolar region was done on 2022 and showed an 11 x 6 x 11 mm hypoechoic mass with slightly indistinct margins. This correlated to the area of interest on the mammogram.    She underwent needle core biopsy of the mass on 10/13/2022. Pathology revealed mucinous carcinoma G2, ER >95%, NC 90% Her2 unamplified by IHC & FISH, ki67 10-15%. She then underwent lumpectomy and sentinel lymph node biopsy on 12/15/2022. Pathology revealed mucinous carcinoma, 1.8 x 0.8 x 0.2 cm, grade 2, DCIS not present, margins negative, 0/1 sentinel nodes, making her final surgical pathology pT1cN0(sn). She is healed well from surgery and does not have any significant or severe postoperative issues. She denies any new lumps or bumps, skin changes, nipple discharge, range of motion problems with either upper extremity, swelling of either extremity, chest pain, cough, or involuntary weight loss. She has had radiation therapy to the head before and took chemotherapy. She does not have a cardiac implanted device.     Past Medical History:   Diagnosis Date    Abnormal mammogram 2017    0.4 cm hypoechoic mass(most likely a small intramammary lymph node or less likely a complicated cyst. Follow-up in 6 months recommended with ultrasound    Attempted suicide [facility]) 2012    hanged herself. Brain neoplasm ([facility]     grade II [place] resected 4/13, Dr. Claudia Shelby resected tumor. Dr. Kirkpatrick [facility] oncologist seeing pt also. No chemo. Dr. Claudia Shelby following pt q year. Tumor recurred, right frontal craniotomy and tumor resection 11/14 Dr. Orion Javier. Had radiation to the brain     Chronic obstructive pulmonary disease ([facility] 06/15/2016    PFT showed mod sees Dr Marquez Briscoe 07/08/2022    positive home test    Cyst of left kidney 09/11/2014    MR I of 7/14. follow-up in 6-12 months    Cyst of pancreas 09/11/2014    Needs follow-up in one year 7/15    Depression     Diabetes mellitus ([facility]     Family history of breast cancer in first degree relative 10/26/2022    Hearing loss     Hearing loss     Herniated disc     L4 &L5    Hyperlipidemia     Kidney stones     Liver dysfunction 06/06/2014    In 7/14 hepatitis ABC negative. Immune studies negative, iron studies normal.  MRI of liver mild fatty liver.  F/u LFTS normal.    Osteopenia 09/26/2014    Sarcoidosis     Skin cancer     nose        Past Surgical History:   Procedure Laterality Date    APPENDECTOMY      BREAST BIOPSY Right 03/05/2020    BREAST LESION BIOPSY EXCISION NEEDLE LOCALIZATION MASS RIGHT performed by Alix Hernandes MD at [address] [facility] LUMPECTOMY Right 12/15/2022    RIGHT BREAST LUMPECTOMY PARTIAL MASTECTOMY WITH  WIRE LOCALIZATION, SENTINEL LYMPH NODE BIOPSY performed by Eben Stubbs MD at [address] [facility] Right 03/05/2020     right breast mass removal by Dr. Gideon Bowden in [place]  Right 02/2022    CATARACT REMOVAL Right 04/2022    CHOLECYSTECTOMY  2006    COLONOSCOPY  2008    f/u 5 years- Dr Derick Davis  04/2013    resection of brain tumor Dr. Cheryl Christian ([address] [place] [address])      LITHOTRIPSY  2005    William Singh  2011    Dr Mana Weber TONSILLECTOMY      US BREAST BIOPSY W LOC DEVICE 1ST LESION RIGHT Right 10/13/2022    US BREAST NEEDLE BIOPSY RIGHT 10/13/2022 [facility] ULTRASOUND    US GUIDED NEEDLE LOC OF RIGHT BREAST Right 12/15/2022    US GUIDED NEEDLE LOC OF RIGHT BREAST [facility] ULTRASOUND       Family History   Problem Relation Age of Onset    Heart Attack Mother     Cancer Father         leukemia    Breast Cancer Sister 45    Breast Cancer Maternal Aunt        Social History     Socioeconomic History    Marital status:      Spouse name: Not on file    Number of children: Not on file    Years of education: Not on file    Highest education level: Not on file   Occupational History    Not on file   Tobacco Use    Smoking status: Former     Packs/day: 0.75     Years: 20.00     Pack years: 15.00     Types: Cigarettes     Quit date: 2003     Years since quittin.4    Smokeless tobacco: Never   Vaping Use    Vaping Use: Never used   Substance and Sexual Activity    Alcohol use: No     Alcohol/week: 0.0 standard drinks    Drug use: No    Sexual activity: Yes     Partners: Male   Other Topics Concern    Not on file   Social History Narrative    Not on file     Social Determinants of Health     Financial Resource Strain: Low Risk     Difficulty of Paying Living Expenses: Not hard at all   Food Insecurity: No Food Insecurity    Worried About Running Out of Food in the Last Year: Never true    Ran Out of Food in the Last Year: Never true   Transportation Needs: Not on file   Physical Activity: Not on file   Stress: Not on file   Social Connections: Not on file   Intimate Partner Violence: Not on file   Housing Stability: Not on file     ALLERGIES:   Allergies   Allergen Reactions    Minocycline     Tetracyclines & Related     Demeclocycline Rash    Tetracycline Rash        Current Outpatient Medications   Medication Sig Dispense Refill    atorvastatin (LIPITOR) 40 MG tablet Take 1 tablet by mouth daily 90 tablet 1    traZODone (DESYREL) 50 MG tablet TAKE ONE-HALF TABLET BY MOUTH NIGHTLY 30 tablet 1    umeclidinium-vilanterol (ANORO ELLIPTA) 62.5-25 MCG/INH AEPB inhaler 1 puff daily 1 each 5    albuterol sulfate HFA (PROVENTIL;VENTOLIN;PROAIR) 108 (90 Base) MCG/ACT inhaler Inhale 2 puffs into the lungs every 4 hours as needed for Wheezing 1 each 3    escitalopram (LEXAPRO) 20 MG tablet Take 20 mg by mouth in the morning. Multiple Vitamin (MULTIVITAMIN ADULT PO) Take 1 tablet by mouth daily (with breakfast)      metFORMIN (GLUCOPHAGE-XR) 500 MG extended release tablet Take 1 tablet by mouth daily (with breakfast) 90 tablet 1    Calcium Carbonate (CALCIUM 600 PO) Take 600 mg by mouth 2 times daily      vitamin D3 (CHOLECALCIFEROL) 25 MCG (1000 UT) TABS tablet Take 2 tablets by mouth daily 30 tablet 5    blood glucose test strips (TRUE METRIX BLOOD GLUCOSE TEST) strip Testing once daily, DX=E11.9 50 strip 5    Lancets Ultra Thin 57X MISC 1 applicator by Does not apply route 2 times daily 100 each 5     No current facility-administered medications for this encounter.      LABORATORY STUDIES:   CBC:   Lab Results   Component Value Date/Time    WBC 6.2 10/29/2021 11:30 AM    RBC 5.23 10/29/2021 11:30 AM    HGB 14.5 10/29/2021 11:30 AM    HCT 45.5 10/29/2021 11:30 AM    MCV 87.0 10/29/2021 11:30 AM    MCH 27.7 10/29/2021 11:30 AM    MCHC 31.9 10/29/2021 11:30 AM    RDW 13.1 10/29/2021 11:30 AM     10/29/2021 11:30 AM    MPV 9.4 10/29/2021 11:30 AM     CMP:  Lab Results   Component Value Date/Time     11/03/2022 11:00 AM    K 4.1 11/03/2022 11:00 AM     11/03/2022 11:00 AM    CO2 23 11/03/2022 11:00 AM    BUN 20 11/03/2022 11:00 AM    CREATININE 0.8 11/03/2022 11:00 AM    GFRAA >60 05/06/2022 11:15 AM    AGRATIO 1.5 07/20/2020 01:54 PM    LABGLOM >60 11/03/2022 11:00 AM    GLUCOSE 139 05/06/2022 11:15 AM    PROT 7.3 11/03/2022 11:00 AM    PROT 7.8 10/14/2012 12:00 PM    LABALBU 4.0 11/03/2022 11:00 AM    CALCIUM 9.2 11/03/2022 11:00 AM BILITOT 0.4 2022 11:00 AM    ALKPHOS 147 2022 11:00 AM    AST 22 2022 11:00 AM    ALT 39 2022 11:00 AM     PATHOLOGY: biopsy and surgical pathology reports reviewed and noted above. RADIOLOGIC STUDIES: mammo and ultrasounds reviewed and noted above. REVIEW OF SYSTEMS:   Constitutional:  Patient denies fevers, rigors, or drenching night sweats. The patient's weight and appetite have been stable. Eyes:  The patient denies any recent visual changes, diplopia, or cataracts  ENMT:  The patient denies any ear pain, hearing changes, or nosebleeds  Respiratory:  The patient denies any SOB, hemoptysis, or green sputum production  Cardiovascular: The patient denies any chest pain, leg edema, or fainting spells  GI:  Patient denies nausea, vomiting, diarrhea, melena, or hematochezia  : Patient denies dysuria, hematuria, or urinary incontinence. Integumentary: patient denies skin rashes, pruritis, or arm edema  Endocrine:  Patient denies any diabetic or thyroid problems  Neurologic: Patient denies headaches, focal weakness, or disorientation  Psychiatric: The patient denies any depression, anxiety, or rapid mood swings. PHYSICAL EXAMINATION:   VITAL SIGNS: BP (!) 161/70   Pulse 85   Temp 97.9 °F (36.6 °C) (Infrared)   Ht 5' 5\" (1.651 m)   Wt 217 lb 9.6 oz (98.7 kg)   SpO2 92%   BMI 36.21 kg/m²   ECOG PERFORMANCE STATUS: 0  PAIN RATIN    GENERAL: Pleasant well-developed adult in no acute distress. Alert and oriented ×3  SKIN: Warm and dry, without jaundice, ecchymoses, or petechiae. HEENT: Normocephalic, atraumatic. Pupils are round and symmetric. Sclerae anicteric  NECK:  No JVD; Supple. No cervical, supraclavicular, or infraclavicular lymphadenopathy is palpable. LUNGS: Bilaterally clear to auscultation. No rales, rhonci, or wheezing are present. Good inspiratory effort, no accessory muscle use.    CARDIAC: Regular rate and rhythm, without murmurs, clicks, or gallops ABDOMEN: Normoactive bowels sounds are present. Soft. Non-tender and non-distended. No hepatosplenomegaly or masses are present. EXTREMITIES: No cyanosis, clubbing or edema is present. FROM  NEUROLOGIC: CN II-XII intact grossly. IMPRESSION/PLAN:    Cecilia Beck is a 67 y.o. female 66 yo with R breast mucinous carcinoma pT1cN0(sn)M0 G1 ER/NC positive Her2 unamplified Ki67 10-15% status post lumpectomy and sentinel lymph node biopsy who presents for consultation of radiotherapy treatment options. She had breast conservation therapy and so I told her typically we recommend adjuvant radiation therapy although she would have been a candidate for the CALGB trial which looked at endocrine therapy versus radiation therapy and endocrine therapy. She has the option of observation, adjuvant endocrine therapy alone, adjuvant radiation therapy alone, or adjuvant endocrine therapy and radiation therapy. If she would like to have adjuvant radiation therapy, I recommend accelerated partial breast irradiation (APBI). The prescription would be 30Gy/5fx delivered via IMRT/VMAT. The indications, risks, and benefits were discussed at length and after all questions were answered she expressed understanding of her diagnosis, prognosis, and my recommendations. She would like to move forward with treatment. She signed consent. She will undergo CT simulation next available. We will aim to start her treatments in a timely manner. She knows to call anytime any questions or concerns may arise in interim.     Electronically signed by Ladonna Lazaro MD on 1/26/2023 at 3:17 PM

## 2023-01-26 NOTE — PROGRESS NOTES
Cristopher Hatfield  1/26/2023    CONSULT     Vitals:    01/26/23 1438   BP: (!) 161/70   Pulse: 85   Temp: 97.9 °F (36.6 °C)   SpO2: 92%        Oxygen Therapy  SpO2: 92 %  Pulse Oximeter Device Mode: Intermittent  O2 Device: None (Room air)  Skin Assessment: Clean, dry, & intact    Wt Readings from Last 3 Encounters:   01/26/23 217 lb 9.6 oz (98.7 kg)   01/17/23 215 lb 9.6 oz (97.8 kg)   12/28/22 217 lb (98.4 kg)        Pain Assessment  Pain Assessment: None - Denies Pain  Denies Need for Intervention    Fall Risk:    Not currently a fall risk  Re-Evaluate Weekly    Nutritional Alteration:  None  No Difficulties Swallowing  Voices Sufficient Oral Intake    Mediport: no    Pacemaker/ICD: no    Implants: no    Previous XRT: yes, Radiation at [facility] in 2015 for brain tumors    Assessment: Here to discuss Radiation therapy options. Past Medical History:   Diagnosis Date    Abnormal mammogram 08/14/2017    0.4 cm hypoechoic mass(most likely a small intramammary lymph node or less likely a complicated cyst.  Follow-up in 6 months recommended with ultrasound    Attempted suicide ([address] 2012    hanged herself. Brain neoplasm ([address]     grade II Oligo resected 4/13, Dr. Omar Young resected tumor. Dr. Ubaldo Bullard oncologist seeing pt also. No chemo. Dr. Omar Young following pt q year. Tumor recurred, right frontal craniotomy and tumor resection 11/14 Dr. Precious Delgadillo. Had radiation to the brain     Chronic obstructive pulmonary disease ([address] 06/15/2016    PFT showed mod sees Dr Sheri Valdez 07/08/2022    positive home test    Cyst of left kidney 09/11/2014    MR I of 7/14.  follow-up in 6-12 months    Cyst of pancreas 09/11/2014    Needs follow-up in one year 7/15    Depression     Diabetes mellitus ([address]     Family history of breast cancer in first degree relative 10/26/2022    Hearing loss     Hearing loss     Herniated disc     L4 &L5    Hyperlipidemia     Kidney stones     Liver dysfunction 06/06/2014    In 7/14 hepatitis ABC negative. Immune studies negative, iron studies normal.  MRI of liver mild fatty liver.  F/u LFTS normal.    Osteopenia 09/26/2014    Sarcoidosis     Skin cancer     nose       Past Surgical History:   Procedure Laterality Date    APPENDECTOMY      BREAST BIOPSY Right 03/05/2020    BREAST LESION BIOPSY EXCISION NEEDLE LOCALIZATION MASS RIGHT performed by Jimbo Watson MD at [address] [facility] LUMPECTOMY Right 12/15/2022    RIGHT BREAST LUMPECTOMY PARTIAL MASTECTOMY WITH  WIRE LOCALIZATION, SENTINEL LYMPH NODE BIOPSY performed by Ken Stokes MD at [address] [place] [address] Right 03/05/2020     right breast mass removal by Dr. Wilbern Essex in [place]  Right 02/2022    CATARACT REMOVAL Right 04/2022    CHOLECYSTECTOMY  2006    COLONOSCOPY  2008    f/u 5 years- Dr Linn Shabazz  04/2013    resection of brain tumor Dr. Talia Sprague ([address] [place] [address])      LITHOTRIPSY  2005    Ximena Pacer  2011    Dr Khanh Yee LOC DEVICE 1ST LESION RIGHT Right 10/13/2022    US BREAST NEEDLE BIOPSY RIGHT 10/13/2022 [facility] ULTRASOUND    US GUIDED NEEDLE LOC OF RIGHT BREAST Right 12/15/2022    US GUIDED NEEDLE LOC OF RIGHT BREAST [facility] ULTRASOUND       Allergies   Allergen Reactions    Minocycline     Tetracyclines & Related     Demeclocycline Rash    Tetracycline Rash          Current Outpatient Medications:     atorvastatin (LIPITOR) 40 MG tablet, Take 1 tablet by mouth daily, Disp: 90 tablet, Rfl: 1    traZODone (DESYREL) 50 MG tablet, TAKE ONE-HALF TABLET BY MOUTH NIGHTLY, Disp: 30 tablet, Rfl: 1    umeclidinium-vilanterol (ANORO ELLIPTA) 62.5-25 MCG/INH AEPB inhaler, 1 puff daily, Disp: 1 each, Rfl: 5    albuterol sulfate HFA (PROVENTIL;VENTOLIN;PROAIR) 108 (90 Base) MCG/ACT inhaler, Inhale 2 puffs into the lungs every 4 hours as needed for Wheezing, Disp: 1 each, Rfl: 3    escitalopram (LEXAPRO) 20 MG tablet, Take 20 mg by mouth in the morning., Disp: , Rfl:     Multiple Vitamin (MULTIVITAMIN ADULT PO), Take 1 tablet by mouth daily (with breakfast), Disp: , Rfl:     metFORMIN (GLUCOPHAGE-XR) 500 MG extended release tablet, Take 1 tablet by mouth daily (with breakfast), Disp: 90 tablet, Rfl: 1    Calcium Carbonate (CALCIUM 600 PO), Take 600 mg by mouth 2 times daily, Disp: , Rfl:     vitamin D3 (CHOLECALCIFEROL) 25 MCG (1000 UT) TABS tablet, Take 2 tablets by mouth daily, Disp: 30 tablet, Rfl: 5    blood glucose test strips (TRUE METRIX BLOOD GLUCOSE TEST) strip, Testing once daily, DX=E11.9, Disp: 50 strip, Rfl: 5    Lancets Ultra Thin 99D MISC, 1 applicator by Does not apply route 2 times daily, Disp: 100 each, Rfl: 5        Electronically signed by Gayathri Ortiz CMA on 1/26/2023 at 2:40 PM

## 2023-01-30 RX ORDER — METFORMIN HYDROCHLORIDE 500 MG/1
500 TABLET, EXTENDED RELEASE ORAL
Qty: 90 TABLET | Refills: 1 | Status: SHIPPED | OUTPATIENT
Start: 2023-01-30

## 2023-01-31 ENCOUNTER — HOSPITAL ENCOUNTER (OUTPATIENT)
Dept: INFUSION THERAPY | Age: 73
Discharge: HOME | End: 2023-01-31
Payer: MEDICARE

## 2023-01-31 ENCOUNTER — INITIAL CONSULT (OUTPATIENT)
Dept: ONCOLOGY | Age: 73
End: 2023-01-31
Payer: MEDICARE

## 2023-01-31 VITALS
WEIGHT: 216.8 LBS | BODY MASS INDEX: 36.12 KG/M2 | OXYGEN SATURATION: 95 % | RESPIRATION RATE: 18 BRPM | HEART RATE: 74 BPM | DIASTOLIC BLOOD PRESSURE: 70 MMHG | TEMPERATURE: 97.8 F | HEIGHT: 65 IN | SYSTOLIC BLOOD PRESSURE: 160 MMHG

## 2023-01-31 DIAGNOSIS — Z17.0 CARCINOMA OF AREOLA OF RIGHT BREAST IN FEMALE, ESTROGEN RECEPTOR POSITIVE (HCC): Primary | ICD-10-CM

## 2023-01-31 DIAGNOSIS — C50.011 CARCINOMA OF AREOLA OF RIGHT BREAST IN FEMALE, ESTROGEN RECEPTOR POSITIVE (HCC): Primary | ICD-10-CM

## 2023-01-31 PROCEDURE — G8427 DOCREV CUR MEDS BY ELIG CLIN: HCPCS | Performed by: INTERNAL MEDICINE

## 2023-01-31 PROCEDURE — 1124F ACP DISCUSS-NO DSCNMKR DOCD: CPT | Performed by: INTERNAL MEDICINE

## 2023-01-31 PROCEDURE — 1090F PRES/ABSN URINE INCON ASSESS: CPT | Performed by: INTERNAL MEDICINE

## 2023-01-31 PROCEDURE — 99205 OFFICE O/P NEW HI 60 MIN: CPT | Performed by: INTERNAL MEDICINE

## 2023-01-31 PROCEDURE — 99211 OFF/OP EST MAY X REQ PHY/QHP: CPT

## 2023-01-31 PROCEDURE — 1036F TOBACCO NON-USER: CPT | Performed by: INTERNAL MEDICINE

## 2023-01-31 PROCEDURE — G8399 PT W/DXA RESULTS DOCUMENT: HCPCS | Performed by: INTERNAL MEDICINE

## 2023-01-31 PROCEDURE — G8484 FLU IMMUNIZE NO ADMIN: HCPCS | Performed by: INTERNAL MEDICINE

## 2023-01-31 PROCEDURE — 3017F COLORECTAL CA SCREEN DOC REV: CPT | Performed by: INTERNAL MEDICINE

## 2023-01-31 PROCEDURE — G9899 SCRN MAM PERF RSLTS DOC: HCPCS | Performed by: INTERNAL MEDICINE

## 2023-01-31 PROCEDURE — G8417 CALC BMI ABV UP PARAM F/U: HCPCS | Performed by: INTERNAL MEDICINE

## 2023-01-31 RX ORDER — LETROZOLE 2.5 MG/1
2.5 TABLET, FILM COATED ORAL DAILY
Qty: 30 TABLET | Refills: 3 | Status: SHIPPED | OUTPATIENT
Start: 2023-01-31

## 2023-01-31 ASSESSMENT — PATIENT HEALTH QUESTIONNAIRE - PHQ9
SUM OF ALL RESPONSES TO PHQ QUESTIONS 1-9: 2
1. LITTLE INTEREST OR PLEASURE IN DOING THINGS: 1
SUM OF ALL RESPONSES TO PHQ9 QUESTIONS 1 & 2: 2
SUM OF ALL RESPONSES TO PHQ QUESTIONS 1-9: 2
SUM OF ALL RESPONSES TO PHQ QUESTIONS 1-9: 2
2. FEELING DOWN, DEPRESSED OR HOPELESS: 1
SUM OF ALL RESPONSES TO PHQ QUESTIONS 1-9: 2

## 2023-01-31 NOTE — PROGRESS NOTES
MA Rooming Questions  Patient: Josselin Ruiz  MRN: [id number]    Date: 1/31/2023        FRANCOIS Arredondo CMA

## 2023-01-31 NOTE — PROGRESS NOTES
Patient Name:  Maryelizabeth Holstein  Patient :  1950  Patient MRN:  [id number]     Primary Oncologist: Yash Blanco MD  Referring Provider: Mac Rowe MD     Date of Service: 2023      Reason for Consult: To evaluate the patient with right breast cancer. Chief Complaint:    Chief Complaint   Patient presents with    New Patient     Patient Active Problem List:     Mixed hyperlipidemia     Sarcoidosis     Depression     Hearing loss     Oligoastrocytoma, WHO grade 2 ([address]     Liver dysfunction     Cyst of left kidney     Cyst of pancreas     Chronic obstructive pulmonary disease (HCC)     Abnormal mammogram     Controlled type 2 diabetes mellitus without complication, without long-term current use of insulin ([address]     Carcinoma of areola of right breast in female, estrogen receptor positive ([address]     Family history of breast cancer in first degree relative    HPI:   Sowmyaconchita BatesCody Church is a 67year-old very pleasant female with medical history significant for hyperlipidemia, diabetes mellitus, history of oligo astrocytoma, status post surgery followed by adjuvant chemoradiation, depression/anxiety, kidney stones and sarcoidosis, referred to me on 2023 for 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 She had a screening mammogram on 2022 which showed increasing size of 1.2 cm ovoid right retroareolar breast mass and further evaluation was recommended. Targeted ultrasound of the retroareolar region was done on 2022 and showed an 11 x 6 x 11 mm hypoechoic mass with slightly indistinct margins. This correlated to the area of interest on the mammogram.     She underwent needle core biopsy of the mass on 10/13/2022. Pathology revealed mucinous carcinoma G2, ER >95%, MI 90% Her2 unamplified by IHC & FISH, ki67 10-15%. She then underwent lumpectomy and sentinel lymph node biopsy on 12/15/2022.   Pathology revealed mucinous carcinoma, 1.8 x 0.8 x 0.2 cm, grade 2, DCIS not present, margins negative, 0/1 sentinel nodes, making her final surgical pathology pT1cN0(sn). She was seen by Dr. Shabbir Zelaya and she is in the process of starting APBI soon. She was also referred to me to discuss about adjuvant endocrine therapy. She is healed well from surgery and does not have any significant or severe postoperative issues. Past Medical History:     Significant for  Hyperlipidemia  Diabetes mellitus  Oligoastrocytoma grade II  Depression/anxiety  Family history of breast cancer  History of kidney stones  Sarcoidosis    Past Surgery History:    Significant for  Appendectomy  Hysterectomy  Craniotomy and resection of brain tumor in 4/2013  Tonsillectomy  Oophorectomy   Right cataract surgery  Right breast lunpectomy and axillary lymph node staging on 12/15/22  Lithotripsy    Social History:   She is a former smoker and she quits smoking in 8/22/2003. She used to smoke 15 cigarettes per day for about 20 years. She denies alcohol drinking or illicit drug abuse. Family History:    Significant for CAD in her mother, leukemia in her father, breast cancer in her sister (at the age of 45) and maternal aunt.     Allergies   Allergen Reactions    Minocycline     Tetracyclines & Related     Demeclocycline Rash    Tetracycline Rash       Current Outpatient Medications on File Prior to Visit   Medication Sig Dispense Refill    metFORMIN (GLUCOPHAGE-XR) 500 MG extended release tablet TAKE 1 TABLET BY MOUTH DAILY (WITH BREAKFAST) 90 tablet 1    atorvastatin (LIPITOR) 40 MG tablet Take 1 tablet by mouth daily 90 tablet 1    traZODone (DESYREL) 50 MG tablet TAKE ONE-HALF TABLET BY MOUTH NIGHTLY 30 tablet 1    umeclidinium-vilanterol (ANORO ELLIPTA) 62.5-25 MCG/INH AEPB inhaler 1 puff daily 1 each 5    albuterol sulfate HFA (PROVENTIL;VENTOLIN;PROAIR) 108 (90 Base) MCG/ACT inhaler Inhale 2 puffs into the lungs every 4 hours as needed for Wheezing 1 each 3    escitalopram (LEXAPRO) 20 MG tablet Take 20 mg by mouth in the morning. Multiple Vitamin (MULTIVITAMIN ADULT PO) Take 1 tablet by mouth daily (with breakfast)      Calcium Carbonate (CALCIUM 600 PO) Take 600 mg by mouth 2 times daily      vitamin D3 (CHOLECALCIFEROL) 25 MCG (1000 UT) TABS tablet Take 2 tablets by mouth daily 30 tablet 5    blood glucose test strips (TRUE METRIX BLOOD GLUCOSE TEST) strip Testing once daily, DX=E11.9 50 strip 5    Lancets Ultra Thin 48Q MISC 1 applicator by Does not apply route 2 times daily 100 each 5     No current facility-administered medications on file prior to visit. Review of Systems:  Constitutional:  No weight loss, No fever, No chills, No night sweats. Energy level good. Eyes:  No diplopia, No transient or permanent loss of vision, No scotomata. ENT / Mouth:  No epistaxis, No dysphagia, No hoarseness, No oral ulcers, No gingival bleeding. No sore throat, No postnasal drip, No nasal drip, No mouth pain, No sinus pain, No tinnitus, Normal hearing. Cardiovascular:  No chest pain, No palpitations, No syncope, No upper extremity edema, No lower extremity edema, No calf discomfort. Respiratory:  No cough. No hemoptysis, No pleurisy, No wheezing, No dyspnea. Breast:  No breast mass, No pain, No nipple discharge, No change in size, No change in shape. Gastrointestinal:  No abdominal pain, No abdominal cramping, No nausea, No vomiting, No constipation, No diarrhea, No hematochezia, No melena, No jaundice, No dyspepsia, No dysphagia. Urinary:  No dysuria, No hematuria, No urinary incontinence. Gynecological:  No vaginal discharge, No suprapubic pain, No abnormal vaginal bleeding. Musculoskeletal:  No muscle pain, No swollen joints, No joint redness, No bone pain, No spine tenderness. Skin:  No rash, No nodules, No pruritus, No lesions. Neurologic:  No confusion, No seizures, No syncope, No tremor, No speech change, No headache, No hiccups, No abnormal gait, No sensory changes, No weakness.   Psychiatric:  No depression, No anxiety, Concentration normal.  Endocrine:  No polyuria, No polydipsia, No hot flashes, No thyroid symptoms. Hematologic:  No epistaxis, No gingival bleeding, No petechiae, No ecchymosis. Lymphatic:  No lymphadenopathy, No lymphedema. Allergy / Immunologic:  No eczema, No frequent mucous infections, No frequent respiratory infections, No recurrent urticarial, No frequent skin infections. Vital Signs: BP (!) 160/70 (Site: Left Upper Arm, Position: Sitting, Cuff Size: Large Adult)   Pulse 74   Temp 97.8 °F (36.6 °C) (Temporal)   Resp 18   Ht 5' 5\" (1.651 m)   Wt 216 lb 12.8 oz (98.3 kg)   SpO2 95%   BMI 36.08 kg/m²      Physical Exam:  CONSTITUTIONAL: awake, alert, cooperative, no apparent distress   EYES: pupils equal, round and reactive to light, sclera clear, normal conjunctiva  ENT: Normocephalic, without obvious abnormality, atraumatic  NECK: supple, symmetrical, no jugular venous distension, no carotid bruits   HEMATOLOGIC/LYMPHATIC: no cervical, supraclavicular or axillary lymphadenopathy   LUNGS: VBS, no wheezes, no increased work of breathing, no rhonchi, clear to auscultation, no crackles,    CARDIOVASCULAR: regular rate and rhythm, normal S1 and S2, no murmur noted  ABDOMEN: normal bowel sounds x 4, soft, non-distended, non-tender, no masses palpated, no hepatosplenomegaly   MUSCULOSKELETAL: full range of motion noted, tone is normal  NEUROLOGIC: awake, alert, oriented to name, place and time. Motor skills grossly intact. SKIN: appears intact, normal skin color, normal texture, normal turgor, no jaundice.    EXTREMITIES: no LE edema, no leg swelling, no cyanosis, no clubbing,   BREASTS: Right breast surgical site clean and dry, no palpable mass in bilateral breasts and axilla,       Labs:  Hematology:  Lab Results   Component Value Date    WBC 6.2 10/29/2021    RBC 5.23 10/29/2021    HGB 14.5 10/29/2021    HCT 45.5 10/29/2021    MCV 87.0 10/29/2021    MCH 27.7 10/29/2021 MCHC 31.9 (L) 10/29/2021    RDW 13.1 10/29/2021     10/29/2021    MPV 9.4 10/29/2021    SEGSPCT 70.6 (H) 10/29/2021    EOSRELPCT 1.8 10/29/2021    BASOPCT 0.8 10/29/2021    LYMPHOPCT 17.9 (L) 10/29/2021    MONOPCT 8.4 (H) 10/29/2021    SEGSABS 4.4 10/29/2021    EOSABS 0.1 10/29/2021    BASOSABS 0.1 10/29/2021    LYMPHSABS 1.1 10/29/2021    MONOSABS 0.5 10/29/2021    DIFFTYPE AUTOMATED DIFFERENTIAL 10/29/2021     Lab Results   Component Value Date    ESR 13 04/11/2014     Chemistry:  Lab Results   Component Value Date     11/03/2022    K 4.1 11/03/2022     11/03/2022    CO2 23 11/03/2022    BUN 20 11/03/2022    CREATININE 0.8 11/03/2022    GLUCOSE 139 (H) 05/06/2022    CALCIUM 9.2 11/03/2022    PROT 7.3 11/03/2022    LABALBU 4.0 11/03/2022    BILITOT 0.4 11/03/2022    ALKPHOS 147 (H) 11/03/2022    AST 22 11/03/2022    ALT 39 11/03/2022    LABGLOM >60 11/03/2022    GFRAA >60 05/06/2022    AGRATIO 1.5 07/20/2020    GLOB 3.0 07/20/2020    POCGLU 110 (H) 12/15/2022     No results found for: MMA, LDH, HOMOCYSTEINE  No components found for: LD  Lab Results   Component Value Date    TSHHS 1.500 10/29/2021     Immunology:  Lab Results   Component Value Date    PROT 7.3 11/03/2022     No results found for: Lillette Keepers, KLFLCR  No results found for: B2M  Coagulation Panel:  Lab Results   Component Value Date    PROTIME 8.9 10/14/2012    INR 0.91 10/14/2012    APTT 20.5 (L) 10/14/2012     Anemia Panel:  No results found for: Cleatus Prom, FOLATE  Tumor Markers:  No results found for: , CEA, , LABCA2, PSA     Observations:  PHQ-9 Total Score: 2 (1/31/2023  8:44 AM)     Assessment   Right breast invasive mucinous carcinoma    Plan:  Lili Elizabeth is a 67 y.o. female with R breast invasive mucinous carcinoma, pT1c, pN0(sn), cM0, G1, ER/WA positive, Her2 unamplified, Ki67 10-15%, status post lumpectomy and sentinel lymph node biopsy who presents to discuss about adjuvant systemic therapy options. Since she only has stage IA invasive mucinous carcinoma (which is favorable histology), she doesn't need adjuvant chemotherapy. I recommend her to consider for adjuvant endocrine therapy with aromatase inhibitor, letrozole. We reviewed all the available data and guidelines. Reviewed all the potential side effects as well as benefits of adjuvant endocrine therapy. After long discussion, she is in agreement to start adjuvant endocrine therapy. I recommend her to start letrozole after completion of APBI. I will re evaluate her again in 2 months to make sure that she is tolerating letrozole. I answered all her questions and concerns for today. Thank you for allowing me to participate in the care of this very pleasant patient. Recent imaging and labs were reviewed and discussed with the patient.

## 2023-02-02 ENCOUNTER — OFFICE VISIT (OUTPATIENT)
Dept: INTERNAL MEDICINE CLINIC | Age: 73
End: 2023-02-02
Payer: MEDICARE

## 2023-02-02 VITALS
HEIGHT: 64 IN | DIASTOLIC BLOOD PRESSURE: 88 MMHG | RESPIRATION RATE: 20 BRPM | TEMPERATURE: 98 F | SYSTOLIC BLOOD PRESSURE: 138 MMHG | WEIGHT: 214 LBS | BODY MASS INDEX: 36.54 KG/M2 | OXYGEN SATURATION: 96 % | HEART RATE: 88 BPM

## 2023-02-02 VITALS
HEART RATE: 88 BPM | WEIGHT: 214 LBS | TEMPERATURE: 98 F | BODY MASS INDEX: 36.73 KG/M2 | SYSTOLIC BLOOD PRESSURE: 138 MMHG | OXYGEN SATURATION: 96 % | RESPIRATION RATE: 20 BRPM | DIASTOLIC BLOOD PRESSURE: 88 MMHG

## 2023-02-02 DIAGNOSIS — E78.2 MIXED HYPERLIPIDEMIA: ICD-10-CM

## 2023-02-02 DIAGNOSIS — D86.9 SARCOIDOSIS: ICD-10-CM

## 2023-02-02 DIAGNOSIS — J06.9 VIRAL UPPER RESPIRATORY TRACT INFECTION: ICD-10-CM

## 2023-02-02 DIAGNOSIS — Z00.00 MEDICARE ANNUAL WELLNESS VISIT, SUBSEQUENT: Primary | ICD-10-CM

## 2023-02-02 DIAGNOSIS — F32.A DEPRESSION, UNSPECIFIED DEPRESSION TYPE: ICD-10-CM

## 2023-02-02 DIAGNOSIS — E11.9 CONTROLLED TYPE 2 DIABETES MELLITUS WITHOUT COMPLICATION, WITHOUT LONG-TERM CURRENT USE OF INSULIN (HCC): Primary | ICD-10-CM

## 2023-02-02 DIAGNOSIS — C50.011 CARCINOMA OF AREOLA OF RIGHT BREAST IN FEMALE, ESTROGEN RECEPTOR POSITIVE (HCC): ICD-10-CM

## 2023-02-02 DIAGNOSIS — J44.9 CHRONIC OBSTRUCTIVE PULMONARY DISEASE, UNSPECIFIED COPD TYPE (HCC): ICD-10-CM

## 2023-02-02 DIAGNOSIS — H91.93 BILATERAL HEARING LOSS, UNSPECIFIED HEARING LOSS TYPE: ICD-10-CM

## 2023-02-02 DIAGNOSIS — Z17.0 CARCINOMA OF AREOLA OF RIGHT BREAST IN FEMALE, ESTROGEN RECEPTOR POSITIVE (HCC): ICD-10-CM

## 2023-02-02 PROCEDURE — G8399 PT W/DXA RESULTS DOCUMENT: HCPCS | Performed by: INTERNAL MEDICINE

## 2023-02-02 PROCEDURE — G9899 SCRN MAM PERF RSLTS DOC: HCPCS | Performed by: INTERNAL MEDICINE

## 2023-02-02 PROCEDURE — 2022F DILAT RTA XM EVC RTNOPTHY: CPT | Performed by: INTERNAL MEDICINE

## 2023-02-02 PROCEDURE — 3017F COLORECTAL CA SCREEN DOC REV: CPT | Performed by: INTERNAL MEDICINE

## 2023-02-02 PROCEDURE — 3023F SPIROM DOC REV: CPT | Performed by: INTERNAL MEDICINE

## 2023-02-02 PROCEDURE — 3046F HEMOGLOBIN A1C LEVEL >9.0%: CPT | Performed by: INTERNAL MEDICINE

## 2023-02-02 PROCEDURE — G8417 CALC BMI ABV UP PARAM F/U: HCPCS | Performed by: INTERNAL MEDICINE

## 2023-02-02 PROCEDURE — 99214 OFFICE O/P EST MOD 30 MIN: CPT | Performed by: INTERNAL MEDICINE

## 2023-02-02 PROCEDURE — 1036F TOBACCO NON-USER: CPT | Performed by: INTERNAL MEDICINE

## 2023-02-02 PROCEDURE — G8484 FLU IMMUNIZE NO ADMIN: HCPCS | Performed by: INTERNAL MEDICINE

## 2023-02-02 PROCEDURE — 1090F PRES/ABSN URINE INCON ASSESS: CPT | Performed by: INTERNAL MEDICINE

## 2023-02-02 PROCEDURE — G8427 DOCREV CUR MEDS BY ELIG CLIN: HCPCS | Performed by: INTERNAL MEDICINE

## 2023-02-02 PROCEDURE — 1124F ACP DISCUSS-NO DSCNMKR DOCD: CPT | Performed by: INTERNAL MEDICINE

## 2023-02-02 ASSESSMENT — PATIENT HEALTH QUESTIONNAIRE - PHQ9
4. FEELING TIRED OR HAVING LITTLE ENERGY: 1
SUM OF ALL RESPONSES TO PHQ QUESTIONS 1-9: 7
2. FEELING DOWN, DEPRESSED OR HOPELESS: 1
SUM OF ALL RESPONSES TO PHQ QUESTIONS 1-9: 7
5. POOR APPETITE OR OVEREATING: 1
7. TROUBLE CONCENTRATING ON THINGS, SUCH AS READING THE NEWSPAPER OR WATCHING TELEVISION: 1
10. IF YOU CHECKED OFF ANY PROBLEMS, HOW DIFFICULT HAVE THESE PROBLEMS MADE IT FOR YOU TO DO YOUR WORK, TAKE CARE OF THINGS AT HOME, OR GET ALONG WITH OTHER PEOPLE: 1
1. LITTLE INTEREST OR PLEASURE IN DOING THINGS: 1
6. FEELING BAD ABOUT YOURSELF - OR THAT YOU ARE A FAILURE OR HAVE LET YOURSELF OR YOUR FAMILY DOWN: 1
9. THOUGHTS THAT YOU WOULD BE BETTER OFF DEAD, OR OF HURTING YOURSELF: 0
SUM OF ALL RESPONSES TO PHQ QUESTIONS 1-9: 7
3. TROUBLE FALLING OR STAYING ASLEEP: 1
SUM OF ALL RESPONSES TO PHQ9 QUESTIONS 1 & 2: 2
SUM OF ALL RESPONSES TO PHQ QUESTIONS 1-9: 7
8. MOVING OR SPEAKING SO SLOWLY THAT OTHER PEOPLE COULD HAVE NOTICED. OR THE OPPOSITE, BEING SO FIGETY OR RESTLESS THAT YOU HAVE BEEN MOVING AROUND A LOT MORE THAN USUAL: 0

## 2023-02-02 ASSESSMENT — LIFESTYLE VARIABLES
HOW OFTEN DO YOU HAVE A DRINK CONTAINING ALCOHOL: NEVER
HOW MANY STANDARD DRINKS CONTAINING ALCOHOL DO YOU HAVE ON A TYPICAL DAY: PATIENT DOES NOT DRINK

## 2023-02-02 NOTE — PROGRESS NOTES
Claudette Eke  Patient's  is 1950  Seen in office on 2023      SUBJECTIVE:  Howard Tinajero christian 67 y. o.year old female presents today   Chief Complaint   Patient presents with    Pharyngitis     Cough productive at times    Cough     Cough productive at times    Nasal Congestion    Other     Symptoms started last Thursday       Pt had AWV today  Pt has sore throat and some nasal congestion. Patient taking over-the-counter medication and is getting better. Patient has next visit routine appointment she wants to get checked for that also. Will do routine visit today  Has breast ca and is going to have XRT x 5 and hormonal therpay  Seen Dr Jud Valdez. Patient states she is feeling better. Patient has DM. No hypoglycemia. No numbness or weakness. No dizziness. Blood sugars are good at home. High today 167  Sleeping well   Goes to [facility] for anxiety and depression. Patient denies any headaches. No seizures. Patient sees neurosurgeon. Taking medications regularly. No side effects noted. Review of Systems  Review of system normal except as in HPI  OBJECTIVE: /88   Pulse 88   Temp 98 °F (36.7 °C) (Temporal)   Resp 20   Wt 214 lb (97.1 kg)   SpO2 96%   BMI 36.73 kg/m²     Wt Readings from Last 3 Encounters:   23 214 lb (97.1 kg)   23 214 lb (97.1 kg)   23 216 lb 12.8 oz (98.3 kg)      GENERAL: - Alert, oriented, pleasant, in no apparent distress. HEENT: - Conjunctiva pink, no scleral icterus. ENT clear. NECK: -Supple. No jugular venous distention noted. No masses felt,  CARDIOVASCULAR: - Normal S1 and S2    PULMONARY: - No respiratory distress. No wheezes or rales. ABDOMEN: - Soft and non-tender,no masses  ororganomegaly. EXTREMITIES: - No cyanosis, clubbing, or significant edema. SKIN: Skin is warm and dry. NEUROLOGICAL: - Cranial nerves II through XII are grossly intact. IMPRESSION:    Encounter Diagnoses   Name Primary?     Controlled type 2 diabetes mellitus without complication, without long-term current use of insulin ([facility]) Yes    Viral upper respiratory tract infection     Carcinoma of areola of right breast in female, estrogen receptor positive ([facility]     Mixed hyperlipidemia     Sarcoidosis     Depression, unspecified depression type     Bilateral hearing loss, unspecified hearing loss type     Chronic obstructive pulmonary disease, unspecified COPD type ([facility]        ASSESSMENT/PLAN:    1. Controlled type 2 diabetes mellitus without complication, without long-term current use of insulin ([facility])  Overview:  Blood sugar around 500. Hemoglobin A1c 12.3 on 4/21/2020  Hemoglobin A1c is 6.3  Metformin  500 mg daily  2. Viral upper respiratory tract infection. Patient is taking over-the-counter medication and is getting better. Does not want any antibiotics. Will observe for now if the symptoms get worse to call back. 3. Carcinoma of areola of right breast in female, estrogen receptor positive ([facility]  Patient has breast cancer and is going through treatment. 4. Mixed hyperlipidemia  Overview:  Patient is taking atorvastatin. Continue current medications  5. Sarcoidosis  Overview:  Seen  Dr Kandice Womack. He saw patient and he started her on inhaler Anora and albuterol prn  6. Depression, unspecified depression type  Overview:  Had suicidal attempt by hanging. Sees psychiatrist Dr. Jagdish Allen  On trazodone and risperidone and bupropion  Dr Jagdish Allen retired : pt  Does not want to see new one  She stopped trazodone and risperidone and wellbutrin  Refer to [facility] care : pt did not go. She feels well. Pt is going to [facility] and is on escitolopram now. 7. Bilateral hearing loss, unspecified hearing loss type  Overview:  Has hearing aids bilaterally  8. Chronic obstructive pulmonary disease, unspecified COPD type ([facility]  Overview:  PFT showed mod sees Dr Nathan Schroeder RW  Used Anora and ventolin prn. No orders of the defined types were placed in this encounter.   Return to office 3 months  Mediations reviewed with the patient. Continue current medications. Appropriate prescriptions are addressed. After visit summeryprovided. Follow up as directed sooner if needed. Questions answered and patient verbalizes understanding. Call for any problems, questions, or concerns. Allergies   Allergen Reactions    Minocycline     Tetracyclines & Related     Demeclocycline Rash    Tetracycline Rash     Current Outpatient Medications   Medication Sig Dispense Refill    metFORMIN (GLUCOPHAGE-XR) 500 MG extended release tablet TAKE 1 TABLET BY MOUTH DAILY (WITH BREAKFAST) 90 tablet 1    atorvastatin (LIPITOR) 40 MG tablet Take 1 tablet by mouth daily 90 tablet 1    traZODone (DESYREL) 50 MG tablet TAKE ONE-HALF TABLET BY MOUTH NIGHTLY 30 tablet 1    umeclidinium-vilanterol (ANORO ELLIPTA) 62.5-25 MCG/INH AEPB inhaler 1 puff daily 1 each 5    albuterol sulfate HFA (PROVENTIL;VENTOLIN;PROAIR) 108 (90 Base) MCG/ACT inhaler Inhale 2 puffs into the lungs every 4 hours as needed for Wheezing 1 each 3    escitalopram (LEXAPRO) 20 MG tablet Take 20 mg by mouth in the morning. Multiple Vitamin (MULTIVITAMIN ADULT PO) Take 1 tablet by mouth daily (with breakfast)      Calcium Carbonate (CALCIUM 600 PO) Take 600 mg by mouth 2 times daily      vitamin D3 (CHOLECALCIFEROL) 25 MCG (1000 UT) TABS tablet Take 2 tablets by mouth daily 30 tablet 5    blood glucose test strips (TRUE METRIX BLOOD GLUCOSE TEST) strip Testing once daily, DX=E11.9 50 strip 5    letrozole (FEMARA) 2.5 MG tablet Take 1 tablet by mouth daily 30 tablet 3    Lancets Ultra Thin 87Q MISC 1 applicator by Does not apply route 2 times daily 100 each 5     No current facility-administered medications for this visit.      Past Medical History:   Diagnosis Date    Abnormal mammogram 08/14/2017    0.4 cm hypoechoic mass(most likely a small intramammary lymph node or less likely a complicated cyst.  Follow-up in 6 months recommended with ultrasound Attempted suicide [facility]) 2012    hanged herself. [facility] neoplasm ([facility]     grade II [place] resected 4/13, Dr. Drake Harman resected tumor. Dr. Stefany Ruiz oncologist seeing pt also. No chemo. Dr. Drake Harman following pt q year. Tumor recurred, right frontal craniotomy and tumor resection 11/14 Dr. Renella Primrose. Had radiation to the brain     Chronic obstructive pulmonary disease ([facility] 06/15/2016    PFT showed mod sees Dr Angelina Liang 07/08/2022    positive home test    Cyst of left kidney 09/11/2014    MR I of 7/14. follow-up in 6-12 months    Cyst of pancreas 09/11/2014    Needs follow-up in one year 7/15    Depression     Diabetes mellitus ([facility]     Family history of breast cancer in first degree relative 10/26/2022    Hearing loss     Hearing loss     Herniated disc     L4 &L5    History of external beam radiation therapy 2015    [place]    Hyperlipidemia     Kidney stones     Liver dysfunction 06/06/2014    In 7/14 hepatitis ABC negative. Immune studies negative, iron studies normal.  MRI of liver mild fatty liver.  F/u LFTS normal.    Osteopenia 09/26/2014    Sarcoidosis     Skin cancer     nose     Past Surgical History:   Procedure Laterality Date    APPENDECTOMY      BREAST BIOPSY Right 03/05/2020    BREAST LESION BIOPSY EXCISION NEEDLE LOCALIZATION MASS RIGHT performed by Anjum Harris MD at [address] LUMPECTOMY Right 12/15/2022    RIGHT BREAST LUMPECTOMY PARTIAL MASTECTOMY WITH  WIRE LOCALIZATION, SENTINEL LYMPH NODE BIOPSY performed by Andrea Gonzalez MD at [facility] Right 03/05/2020     right breast mass removal by Dr. Angela Marie in [place]  Right 02/2022    CATARACT REMOVAL Right 04/2022    CHOLECYSTECTOMY  2006    COLONOSCOPY  2008    f/u 5 years- Dr Kay Mccormick  04/2013    resection of brain tumor Dr. Heydi Zamudio ([place])      LITHOTRIPSY  2005    MAMMO IMPLANT [place]  2011    Dr Marge Barkley REMOVAL      SKIN BIOPSY      TONSILLECTOMY      US BREAST BIOPSY W LOC DEVICE 1ST LESION RIGHT Right 10/13/2022    US BREAST NEEDLE BIOPSY RIGHT 10/13/2022 [facility] ULTRASOUND    US GUIDED NEEDLE LOC OF RIGHT BREAST Right 12/15/2022    US GUIDED NEEDLE LOC OF RIGHT BREAST [facility] ULTRASOUND     Social History     Tobacco Use    Smoking status: Former     Packs/day: 0.75     Years: 20.00     Pack years: 15.00     Types: Cigarettes     Quit date: 2003     Years since quittin.4    Smokeless tobacco: Never   Substance Use Topics    Alcohol use: No     Alcohol/week: 0.0 standard drinks       LAB REVIEW:  CBC:   Lab Results   Component Value Date/Time    WBC 6.2 10/29/2021 11:30 AM    HGB 14.5 10/29/2021 11:30 AM    HCT 45.5 10/29/2021 11:30 AM     10/29/2021 11:30 AM     Lipids:   Lab Results   Component Value Date    HDL 48 2022    LDLCALC 101 (H) 2022    LDLDIRECT 108 (H) 10/29/2021    TRIGLYCFAST 170 (H) 2022    CHOLFAST 183 2022     Renal:   Lab Results   Component Value Date/Time    BUN 20 2022 11:00 AM    CREATININE 0.8 2022 11:00 AM     2022 11:00 AM    K 4.1 2022 11:00 AM    ALT 39 2022 11:00 AM    AST 22 2022 11:00 AM    GLUCOSE 139 2022 11:15 AM    GLUF 131 2022 11:00 AM     PT/INR:   Lab Results   Component Value Date/Time    INR 0.91 10/14/2012 12:00 PM     A1C:   Lab Results   Component Value Date    LABA1C 6.3 2022           Johnson Buitrago MD, 2023 , 11:48 AM

## 2023-02-02 NOTE — PROGRESS NOTES
Medicare Annual Wellness Visit    Radha Oviedo is here for Medicare AWV    Assessment & Plan   Medicare annual wellness visit, subsequent    Recommendations for Preventive Services Due: see orders and patient instructions/AVS.  Recommended screening schedule for the next 5-10 years is provided to the patient in written form: see Patient Instructions/AVS.     Return for Medicare Annual Wellness Visit in 1 year. Subjective   The following acute and/or chronic problems were also addressed today:  Not done     Patient's complete Health Risk Assessment and screening values have been reviewed and are found in Flowsheets. The following problems were reviewed today and where indicated follow up appointments were made and/or referrals ordered. Positive Risk Factor Screenings with Interventions:    Fall Risk:  Do you feel unsteady or are you worried about falling? : (!) yes (sometimes)  2 or more falls in past year?: no  Fall with injury in past year?: (!) yes     Interventions:    Patient advised to follow-up in this office for further evaluation and treatment     Depression:  PHQ-2 Score: 2  PHQ-9 Total Score: 7    Interpretation:   1-4 = minimal  5-9 = mild  10-14 = moderate  15-19 = moderately severe  20-27 = severe    Interventions:  Patient declines any further evaluation or treatment          General HRA Questions:  Select all that apply: (!) Stress, Anger    Stress Interventions:  Advised to see psycologist     Anger Interventions:  See above  Advised patient tp psychologist for counseling   Pt has problems with  talking to same thing repeatedly that bothers her.  Gets stressed   Patient is going to [place] [address] for counseling       Social and Emotional Support:  Do you get the social and emotional support that you need?: (!) No (not completely)  Interventions:  Pt is going to psych and counseling     Weight and Activity:  Physical Activity: Inactive    Days of Exercise per Week: 0 days    Minutes of Exercise per Session: 0 min     On average, how many days per week do you engage in moderate to strenuous exercise (like a brisk walk)?: 0 days  Have you lost any weight without trying in the past 3 months?: No  Body mass index: (!) 36.73      Inactivity Interventions:  Patient declined any further interventions or treatment  Obesity Interventions:  1500 calorie/day diet after breast treatment done            Hearing Screen:  Do you or your family notice any trouble with your hearing that hasn't been managed with hearing aids?: (!) Yes (\"hearing aids do not always help\")    Interventions:  Pt did not bring hearing aids today     Vision Screen:  Do you have difficulty driving, watching TV, or doing any of your daily activities because of your eyesight?: No  Have you had an eye exam within the past year?: (!) No  No results found. Interventions:   Patient encouraged to make appointment with their eye specialist    Safety:  Do you have any tripping hazards - clutter in doorways, halls, or stairs?: (!) Yes (shoes on stairway)    Interventions:  Fall precuation discussed      Advanced Directives:  Do you have a Living Will?: (!) No    Intervention:  has NO advanced directive  - referred to ACP Coordinator                                Objective   Vitals:    02/02/23 1104   BP: 138/88   Pulse: 88   Resp: 20   Temp: 98 °F (36.7 °C)   TempSrc: Temporal   SpO2: 96%   Weight: 214 lb (97.1 kg)   Height: 5' 4\" (1.626 m)      Body mass index is 36.73 kg/m². See other note        Allergies   Allergen Reactions    Minocycline     Tetracyclines & Related     Demeclocycline Rash    Tetracycline Rash     Prior to Visit Medications    Medication Sig Taking?  Authorizing Provider   metFORMIN (GLUCOPHAGE-XR) 500 MG extended release tablet TAKE 1 TABLET BY MOUTH DAILY (WITH BREAKFAST) Yes Virginia Munoz MD   atorvastatin (LIPITOR) 40 MG tablet Take 1 tablet by mouth daily Yes Evette Dunbar MD   traZODone (DESYREL) 50 MG tablet TAKE ONE-HALF TABLET BY MOUTH NIGHTLY Yes Faraz Garcia MD   umeclidinium-vilanterol (ANORO ELLIPTA) 62.5-25 MCG/INH AEPB inhaler 1 puff daily Yes Faraz Garcia MD   albuterol sulfate HFA (PROVENTIL;VENTOLIN;PROAIR) 108 (90 Base) MCG/ACT inhaler Inhale 2 puffs into the lungs every 4 hours as needed for Wheezing Yes Faraz Garcia MD   escitalopram (LEXAPRO) 20 MG tablet Take 20 mg by mouth in the morning.  Yes Historical Provider, MD   Multiple Vitamin (MULTIVITAMIN ADULT PO) Take 1 tablet by mouth daily (with breakfast) Yes Historical Provider, MD   Calcium Carbonate (CALCIUM 600 PO) Take 600 mg by mouth 2 times daily Yes Historical Provider, MD   vitamin D3 (CHOLECALCIFEROL) 25 MCG (1000 UT) TABS tablet Take 2 tablets by mouth daily Yes Aleida Alva MD   blood glucose test strips (TRUE METRIX BLOOD GLUCOSE TEST) strip Testing once daily, DX=E11.9 Yes Aleida Alva MD   Lancets Ultra Thin 13Q MISC 1 applicator by Does not apply route 2 times daily Yes Aleida Alva MD   letrozole (FEMARA) 2.5 MG tablet Take 1 tablet by mouth daily  Emeka Ruano MD       [facility] (Including outside providers/suppliers regularly involved in providing care):   Patient Care Team:  Aleida Alva MD as PCP - General (Internal Medicine)  Aleida Alva MD as PCP - Empaneled Provider     Reviewed and updated this visit:  Tobacco  Allergies  Meds  Problems  Med Hx  Surg Hx  Soc Hx  Fam Hx               Aleida Alva MD

## 2023-02-02 NOTE — PATIENT INSTRUCTIONS
Preventing Falls: Care Instructions  Overview     Getting around your home safely can be a challenge if you have injuries or health problems that make it easy for you to fall. Loose rugs and furniture in walkways are among the dangers for many older people who have problems walking or who have poor eyesight. People who have conditions such as arthritis, osteoporosis, or dementia also have to be careful not to fall. You can make your home safer with a few simple measures. Follow-up care is a key part of your treatment and safety. Be sure to make and go to all appointments, and call your doctor if you are having problems. It's also a good idea to know your test results and keep a list of the medicines you take. How can you care for yourself at home? Taking care of yourself  Exercise regularly to improve your strength, muscle tone, and balance. Walk if you can. Swimming may be a good choice if you cannot walk easily. Have your vision and hearing checked each year or any time you notice a change. If you have trouble seeing and hearing, you might not be able to avoid objects and could lose your balance. Know the side effects of the medicines you take. Ask your doctor or pharmacist whether the medicines you take can affect your balance. Sleeping pills or sedatives can affect your balance. Limit the amount of alcohol you drink. Alcohol can impair your balance and other senses. Ask your doctor whether calluses or corns on your feet need to be removed. If you wear loose-fitting shoes because of calluses or corns, you can lose your balance and fall. Talk to your doctor if you have numbness in your feet. You may get dizzy if you do not drink enough water. To prevent dehydration, drink plenty of fluids. Choose water and other clear liquids. If you have kidney, heart, or liver disease and have to limit fluids, talk with your doctor before you increase the amount of fluids you drink.   Preventing falls at home  Remove raised doorway thresholds, throw rugs, and clutter. Repair loose carpet or raised areas in the floor. Move furniture and electrical cords to keep them out of walking paths. Use nonskid floor wax, and wipe up spills right away, especially on ceramic tile floors. If you use a walker or cane, put rubber tips on it. If you use crutches, clean the bottoms of them regularly with an abrasive pad, such as steel wool. Keep your house well lit, especially stairways, porches, and outside walkways. Use night-lights in areas such as hallways and bathrooms. Add extra light switches or use remote switches (such as switches that go on or off when you clap your hands) to make it easier to turn lights on if you have to get up during the night. Install sturdy handrails on stairways. Move items in your cabinets so that the things you use a lot are on the lower shelves (about waist level). Keep a cordless phone and a flashlight with new batteries by your bed. If possible, put a phone in each of the main rooms of your house, or carry a cell phone in case you fall and cannot reach a phone. Or, you can wear a device around your neck or wrist. You push a button that sends a signal for help. Wear low-heeled shoes that fit well and give your feet good support. Use footwear with nonskid soles. Check the heels and soles of your shoes for wear. Repair or replace worn heels or soles. Do not wear socks without shoes on smooth floors, such as wood. Walk on the grass when the sidewalks are slippery. If you live in an area that gets snow and ice in the winter, sprinkle salt on slippery steps and sidewalks. Or ask a family member or friend to do this for you. Preventing falls in the bath  Install grab bars and nonskid mats inside and outside your shower or tub and near the toilet and sinks. Use shower chairs and bath benches. Use a hand-held shower head that will allow you to sit while showering.   Get into a tub or shower by putting the weaker leg in first. Get out of a tub or shower with your strong side first.  Repair loose toilet seats and consider installing a raised toilet seat to make getting on and off the toilet easier. Keep your bathroom door unlocked while you are in the shower. Where can you learn more? Go to http://www.lemus.com/ and enter G117 to learn more about \"Preventing Falls: Care Instructions. \"  Current as of: May 4, 2022               Content Version: 13.5  © 5364-5788 Healthwise, [employer]. Care instructions adapted under license by [facility] ([facility]). If you have questions about a medical condition or this instruction, always ask your healthcare professional. [address] any warranty or liability for your use of this information. Learning About Mindfulness for Stress  What are mindfulness and stress? Stress is what you feel when you have to handle more than you are used to. A lot of things can cause stress. You may feel stress when you go on a job interview, take a test, or run a race. This kind of short-term stress is normal and even useful. It can help you if you need to work hard or react quickly. Stress also can last a long time. Long-term stress is caused by stressful situations or events. Examples of long-term stress include long-term health problems, ongoing problems at work, and conflicts in your family. Long-term stress can harm your health. Mindfulness is a focus only on things happening in the present moment. It's a process of purposefully paying attention to and being aware of your surroundings, your emotions, your thoughts, and how your body feels. You are aware of these things, but you aren't judging these experiences as \"good\" or \"bad. \" Mindfulness can help you learn to calm your mind and body to help you cope with illness, pain, and stress. How does mindfulness help to relieve stress? Mindfulness can help quiet your mind and relax your body. Studies show that it can help some people sleep better, feel less anxious, and bring their blood pressure down. And it's been shown to help some people live and cope better with certain health problems like heart disease, depression, chronic pain, and cancer. How do you practice mindfulness? To be mindful is to pay attention, to be present, and to be accepting. When you're mindful, you do just one thing and you pay close attention to that one thing. For example, you may sit quietly and notice your emotions or how your food tastes and smells. When you're present, you focus on the things that are happening right now. You let go of your thoughts about the past and the future. When you dwell on the past or the future, you miss moments that can heal and strengthen you. You may miss moments like hearing a child laugh or seeing a friendly face when you think you're all alone. When you're accepting, you don't  the present moment. Instead you accept your thoughts and feelings as they come. You can practice anytime, anywhere, and in any way you choose. You can practice in many ways. Here are a few ideas:  While doing your chores, like washing the dishes, let your mind focus on what's in your hand. What does the dish feel like? Is the water warm or cold? Go outside and take a few deep breaths. What is the air like? Is it warm or cold? When you can, take some time at the start of your day to sit alone and think. Take a slow walk by yourself. Count your steps while you breathe in and out. Try yoga breathing exercises, stretches, and poses to strengthen and relax your muscles. At work, if you can, try to stop for a few moments each hour. Note how your body feels. Let yourself regroup and let your mind settle before you return to what you were doing. If you struggle with anxiety or \"worry thoughts,\" imagine your mind as a blue hugh and your worry thoughts as clouds.  Now imagine those worry thoughts floating across your mind's hugh. Just let them pass by as you watch. Follow-up care is a key part of your treatment and safety. Be sure to make and go to all appointments, and call your doctor if you are having problems. It's also a good idea to know your test results and keep a list of the medicines you take. Where can you learn more? Go to http://www.lemus.com/ and enter M676 to learn more about \"Learning About Mindfulness for Stress. \"  Current as of: February 9, 2022               Content Version: 13.5  © 3347-1236 [employer]. Care instructions adapted under license by [facility] ([facility]). If you have questions about a medical condition or this instruction, always ask your healthcare professional. [address] any warranty or liability for your use of this information. For more information on your local Area Agency on Aging or [place] on Aging please visit the appropriate web site below:    [place]: Leap.it.pl    [facility]: https://aging. [place].gov/    [place]: https://aging.sc.gov/    [place]: InsuranceSquad.es           Learning About Stress  What is stress? Stress is what you feel when you have to handle more than you are used to. Stress is a fact of life for most people, and it affects everyone differently. What causes stress for you may not be stressful for someone else. A lot of things can cause stress. You may feel stress when you go on a job interview, take a test, or run a race. This kind of short-term stress is normal and even useful. It can help you if you need to work hard or react quickly. For example, stress can help you finish an important job on time. Stress also can last a long time. Long-term stress is caused by stressful situations or events. Examples of long-term stress include long-term health problems, ongoing problems at work, or conflicts in your family. Long-term stress can harm your health.   How does stress affect your health? When you are stressed, your body responds as though you are in danger. It makes hormones that speed up your heart, make you breathe faster, and give you a burst of energy. This is called the fight-or-flight stress response. If the stress is over quickly, your body goes back to normal and no harm is done. But if stress happens too often or lasts too long, it can have bad effects. Long-term stress can make you more likely to get sick, and it can make symptoms of some diseases worse. If you tense up when you are stressed, you may develop neck, shoulder, or low back pain. Stress is linked to high blood pressure and heart disease. Stress also harms your emotional health. It can make you nunez, tense, or depressed. Your relationships may suffer, and you may not do well at work or school. What can you do to manage stress? How to relax your mind   Write. It may help to write about things that are bothering you. This helps you find out how much stress you feel and what is causing it. When you know this, you can find better ways to cope. Let your feelings out. Talk, laugh, cry, and express anger when you need to. Talking with friends, family, a counselor, or a member of the clergy about your feelings is a healthy way to relieve stress. Do something you enjoy. For example, listen to music or go to a movie. Practice your hobby or do volunteer work. Meditate. This can help you relax, because you are not worrying about what happened before or what may happen in the future. Do guided imagery. Imagine yourself in any setting that helps you feel calm. You can use audiotapes, books, or a teacher to guide you. How to relax your body   Do something active. Exercise or activity can help reduce stress. Walking is a great way to get started. Even everyday activities such as housecleaning or yard work can help. Do breathing exercises.  For example:  From a standing position, bend forward from the waist with your knees slightly bent. Let your arms dangle close to the floor. Breathe in slowly and deeply as you return to a standing position. Roll up slowly and lift your head last.  Hold your breath for just a few seconds in the standing position. Breathe out slowly and bend forward from the waist.  Try yoga or ibeth chi. These techniques combine exercise and meditation. You may need some training at first to learn them. What can you do to prevent stress? Manage your time. This helps you find time to do the things you want and need to do. Get enough sleep. Your body recovers from the stresses of the day while you are sleeping. Get support. Your family, friends, and community can make a difference in how you experience stress. Where can you learn more? Go to http://www.lemus.com/ and enter N032 to learn more about \"Learning About Stress. \"  Current as of: October 6, 2021               Content Version: 13.5  © 4002-3285 "[employer]". Care instructions adapted under license by [employer] . If you have questions about a medical condition or this instruction, always ask your healthcare professional. [place] [address] any warranty or liability for your use of this information. Learning About Managing Anger  What causes anger? Many things can cause anger: Stress at work or at home. Social situations that make you angry. A response to everyday events. Anger signals your body to prepare for a fight. This reaction is often called \"fight or flight. \" When you get angry, adrenaline and other hormones are released into your blood. Then your blood pressure goes up, your heart beats faster, and you breathe faster. When you express anger in a healthy way, it can inspire change and make you productive. But if you don't have the skills to express anger in a healthy way, anger can build up. You may hurt others--and yourself--emotionally and even physically.  Violent behavior often starts with verbal threats or fairly minor incidents. But over time, it can involve physical harm. It can include physical, verbal, or sexual abuse of an intimate partner (domestic violence), a child (child abuse), or an older adult (elder abuse). It can also make you sick. Anger and constant hostility keep your blood pressure high. They increase your chances of having another health problem, such as depression, a heart attack, or a stroke. Some people with post-traumatic stress disorder (PTSD) feel angry and on alert all the time. It may feel like there are no other ways to react when you are angry. But when you learn to work with anger in appropriate and healthy ways, your anger no longer controls you. How can you manage your anger? The first step to managing anger is to be more aware of it. Note the thoughts, feelings, and emotions that you have when you get angry. Practice noticing these signs of anger when you are calm. If you are more aware of the signs of anger, you can take steps to manage it. Here are a few tips: Think before you act. Take time to stop and cool down when you feel yourself getting angry. Count to 10 while you take slow, steady breaths. Practice some other form of mental relaxation. Learn the feelings that lead to angry outbursts. Anger and hostility may be a symptom of unhappy feelings or depression about your job, your relationship, or other aspects of your personal life. Avoid situations that lead to angry outbursts. If standing in line bothers you, do errands at less busy times. Express anger in a healthy way. You might:  Go for a short walk or jog. Draw, paint, or listen to music to release the anger. Write in a daily journal.  Use \"I\" statements, not \"you\" statements, to discuss your anger. Say \"I don't feel valued when my needs are not being met\" instead of \"You make me mad when you are so inconsiderate. \"  Take care of yourself. Exercise regularly.   Eat a variety of healthy foods. Don't skip meals. Try to get 8 hours of sleep each night. Limit your use of alcohol, and don't use drugs. Practice yoga, meditation, or ibeth chi to relax. Explore other resources that may be available through your job or your community. Contact your human resources department at work. You might be able to get services through an employee assistance program.  Contact your local hospital, mental health facility, or health department. Ask what types of programs or support groups are available in your area. Do not keep guns in your home. If you must have guns in your home, unload them and lock them up. Lock ammunition in a separate place. Keep guns away from children. Where can you find help? If anger or stress starts to harm your work or personal relationships, you might seek help. You can learn ways to manage your feelings and actions. Talk to someone you trust, or find a counselor. There are groups in your area that can connect you with people to talk to. [facility] . This service from the [facility] Substance Abuse and [employer]  can help you find local counselors. Search online at [employer]. [place].gov or call 5-873-722-HELP ([phone]), or [employer] 3-604.218.3197. Parents Anonymous. Self-help groups that serve parents under stress, as well as children who have been abused, are available throughout the [place], [place] ([facility]), and [facility]. To find a group in your area, search online or in your phone book under Parents Anonymous or call (731) [phone]. Where can you learn more? Go to http://www.lemus.com/ and enter Z357 to learn more about \"Learning About Managing Anger. \"  Current as of: February 9, 2022               Content Version: 13.5  © 5175-3778 Healthwise, [employer]. Care instructions adapted under license by [facility] ([facility]).  If you have questions about a medical condition or this instruction, always ask your healthcare professional. [address] any warranty or liability for your use of this information. Learning About Being Active as an Older Adult  Why is being active important as you get older? Being active is one of the best things you can do for your health. And it's never too late to start. Being active--or getting active, if you aren't already--has definite benefits. It can:  Give you more energy,  Keep your mind sharp. Improve balance to reduce your risk of falls. Help you manage chronic illness with fewer medicines. No matter how old you are, how fit you are, or what health problems you have, there is a form of activity that will work for you. And the more physical activity you can do, the better your overall health will be. What kinds of activity can help you stay healthy? Being more active will make your daily activities easier. Physical activity includes planned exercise and things you do in daily life. There are four types of activity:  Aerobic. Doing aerobic activity makes your heart and lungs strong. Includes walking, dancing, and gardening. Aim for at least 2½ hours spread throughout the week. It improves your energy and can help you sleep better. Muscle-strengthening. This type of activity can help maintain muscle and strengthen bones. Includes climbing stairs, using resistance bands, and lifting or carrying heavy loads. Aim for at least twice a week. It can help protect the knees and other joints. Stretching. Stretching gives you better range of motion in joints and muscles. Includes upper arm stretches, calf stretches, and gentle yoga. Aim for at least twice a week, preferably after your muscles are warmed up from other activities. It can help you function better in daily life. Balancing. This helps you stay coordinated and have good posture. Includes heel-to-toe walking, ibeth chi, and certain types of yoga.   Aim for at least 3 days a week.  It can reduce your risk of falling. Even if you have a hard time meeting the recommendations, it's better to be more active than less active. All activity done in each category counts toward your weekly total. You'd be surprised how daily things like carrying groceries, keeping up with grandchildren, and taking the stairs can add up. What keeps you from being active? If you've had a hard time being more active, you're not alone. Maybe you remember being able to do more. Or maybe you've never thought of yourself as being active. It's frustrating when you can't do the things you want. Being more active can help. What's holding you back? Getting started. Have a goal, but break it into easy tasks. Small steps build into big accomplishments. Staying motivated. If you feel like skipping your activity, remember your goal. Maybe you want to move better and stay independent. Every activity gets you one step closer. Not feeling your best.  Start with 5 minutes of an activity you enjoy. Prove to yourself you can do it. As you get comfortable, increase your time. You may not be where you want to be. But you're in the process of getting there. Everyone starts somewhere. How can you find safe ways to stay active? Talk with your doctor about any physical challenges you're facing. Make a plan with your doctor if you have a health problem or aren't sure how to get started with activity. If you're already active, ask your doctor if there is anything you should change to stay safe as your body and health change. If you tend to feel dizzy after you take medicine, avoid activity at that time. Try being active before you take your medicine. This will reduce your risk of falls. If you plan to be active at home, make sure to clear your space before you get started. Remove things like TV cords, coffee tables, and throw rugs. It's safest to have plenty of space to move freely.   The key to getting more active is to take it slow and steady. Try to improve only a little bit at a time. Pick just one area to improve on at first. And if an activity hurts, stop and talk to your doctor. Where can you learn more? Go to http://www.lemus.com/ and enter P600 to learn more about \"Learning About Being Active as an Older Adult. \"  Current as of: October 10, 2022               Content Version: 13.5  © 2006-2022 "[employer]. Care instructions adapted under license by [facility] ([facility]). If you have questions about a medical condition or this instruction, always ask your healthcare professional. [place] [address] any warranty or liability for your use of this information. Hearing Loss: Care Instructions  Overview     Hearing loss is a sudden or slow decrease in how well you hear. It can range from mild to severe. Permanent hearing loss can occur with aging. It also can happen when you are exposed long-term to loud noise. Examples include listening to loud music, riding motorcycles, or being around other loud machines. Hearing loss can affect your work and home life. It can make you feel lonely or depressed. You may feel that you have lost your independence. But hearing aids and other devices can help you hear better and feel connected to others. Follow-up care is a key part of your treatment and safety. Be sure to make and go to all appointments, and call your doctor if you are having problems. It's also a good idea to know your test results and keep a list of the medicines you take. How can you care for yourself at home? Avoid loud noises whenever possible. This helps keep your hearing from getting worse. Always wear hearing protection around loud noises. Wear a hearing aid as directed. See a professional who can help you pick a hearing aid that fits you. Have hearing tests as your doctor suggests. They can show whether your hearing has changed.  Your hearing aid may need to be adjusted. Use other devices as needed. These may include:  Telephone amplifiers and hearing aids that can connect to a television, stereo, radio, or microphone. Devices that use lights or vibrations. These alert you to the doorbell, a ringing telephone, or a baby monitor. Television closed-captioning. This shows the words at the bottom of the screen. Most new TVs can do this. TTY (text telephone). This lets you type messages back and forth on the telephone instead of talking or listening. These devices are also called TDD. When messages are typed on the keyboard, they are sent over the phone line to a receiving TTY. The message is shown on a monitor. Use text messaging, social media, and email if it is hard for you to communicate by telephone. Try to learn a listening technique called speechreading. It is not lipreading. You pay attention to people's gestures, expressions, posture, and tone of voice. These clues can help you understand what a person is saying. Face the person you are talking to, and have them face you. Make sure the lighting is good. You need to see the other person's face clearly. Think about counseling if you need help to adjust to your hearing loss. When should you call for help? Watch closely for changes in your health, and be sure to contact your doctor if:    You think your hearing is getting worse.     You have new symptoms, such as dizziness or nausea. Where can you learn more? Go to http://www.lemus.com/ and enter R798 to learn more about \"Hearing Loss: Care Instructions. \"  Current as of: May 4, 2022               Content Version: 13.5  © 5840-6086 Healthwise, Incorporated. Care instructions adapted under license by [facility] ([facility]). If you have questions about a medical condition or this instruction, always ask your healthcare professional. [address] any warranty or liability for your use of this information.            Learning About Vision Tests  What are vision tests? The four most common vision tests are visual acuity tests, refraction, visual field tests, and color vision tests. Visual acuity (sharpness) tests  These tests are used: To see if you need glasses or contact lenses. To monitor an eye problem. To check an eye injury. Visual acuity tests are done as part of routine exams. You may also have this test when you get your 's license or apply for some types of jobs. Visual field tests  These tests are used: To check for vision loss in any area of your range of vision. To screen for certain eye diseases. To look for nerve damage after a stroke, head injury, or other problem that could reduce blood flow to the brain. Refraction and color tests  A refraction test is done to find the right prescription for glasses and contact lenses. A color vision test is done to check for color blindness. Color vision is often tested as part of a routine exam. You may also have this test when you apply for a job where recognizing different colors is important, such as , electronics, or the [employer]. How are vision tests done? Visual acuity test   You cover one eye at a time. You read aloud from a wall chart across the room. You read aloud from a small card that you hold in your hand. Refraction   You look into a special device. The device puts lenses of different strengths in front of each eye to see how strong your glasses or contact lenses need to be. Visual field tests   Your doctor may have you look through special machines. Or your doctor may simply have you stare straight ahead while they move a finger into and out of your field of vision. Color vision test   You look at pieces of printed test patterns in various colors. You say what number or symbol you see. Your doctor may have you trace the number or symbol using a pointer. How do these tests feel?   There is very little chance of having a problem from this test. If dilating drops are used for a vision test, they may make the eyes sting and cause a medicine taste in the mouth. Follow-up care is a key part of your treatment and safety. Be sure to make and go to all appointments, and call your doctor if you are having problems. It's also a good idea to know your test results and keep a list of the medicines you take. Where can you learn more? Go to http://www.lemus.com/ and enter G551 to learn more about \"Learning About Vision Tests. \"  Current as of: October 12, 2022               Content Version: 13.5  © 2006-2022 [employer]. Care instructions adapted under license by [facility] ([facility]). If you have questions about a medical condition or this instruction, always ask your healthcare professional. [address] any warranty or liability for your use of this information. Advance Directives: Care Instructions  Overview  An advance directive is a legal way to state your wishes at the end of your life. It tells your family and your doctor what to do if you can't say what you want. There are two main types of advance directives. You can change them any time your wishes change. Living will. This form tells your family and your doctor your wishes about life support and other treatment. The form is also called a declaration. Medical power of . This form lets you name a person to make treatment decisions for you when you can't speak for yourself. This person is called a health care agent (health care proxy, health care surrogate). The form is also called a durable power of  for health care. If you do not have an advance directive, decisions about your medical care may be made by a family member, or by a doctor or a  who doesn't know you. It may help to think of an advance directive as a gift to the people who care for you.  If you have one, they won't have to make tough decisions by themselves. For more information, including forms for your state, see the [address] website (www.caringinfo.org/planning/advance-directives/). Follow-up care is a key part of your treatment and safety. Be sure to make and go to all appointments, and call your doctor if you are having problems. It's also a good idea to know your test results and keep a list of the medicines you take. What should you include in an advance directive? Many states have a unique advance directive form. (It may ask you to address specific issues.) Or you might use a universal form that's approved by many states. If your form doesn't tell you what to address, it may be hard to know what to include in your advance directive. Use the questions below to help you get started. Who do you want to make decisions about your medical care if you are not able to? What life-support measures do you want if you have a serious illness that gets worse over time or can't be cured? What are you most afraid of that might happen? (Maybe you're afraid of having pain, losing your independence, or being kept alive by machines.)  Where would you prefer to die? (Your home? A hospital? A nursing home?)  Do you want to donate your organs when you die? Do you want certain Cheondo[employer] practices performed before you die? When should you call for help? Be sure to contact your doctor if you have any questions. Where can you learn more? Go to http://www.lemus.com/ and enter R264 to learn more about \"Advance Directives: Care Instructions. \"  Current as of: June 16, 2022               Content Version: 13.5  © 6506-0252 Healthwise, Incorporated. Care instructions adapted under license by [facility] ([facility]). If you have questions about a medical condition or this instruction, always ask your healthcare professional. [address] any warranty or liability for your use of this information.            Starting a Weight Loss Plan: Care Instructions  Overview     If you're thinking about losing weight, it can be hard to know where to start. Your doctor can help you set up a weight loss plan that best meets your needs. You may want to take a class on nutrition or exercise, or you could join a weight loss support group. If you have questions about how to make changes to your eating or exercise habits, ask your doctor about seeing a registered dietitian or an exercise specialist.  It can be a big challenge to lose weight. But you don't have to make huge changes at once. Make small changes, and stick with them. When those changes become habit, add a few more changes. If you don't think you're ready to make changes right now, try to pick a date in the future. Make an appointment to see your doctor to discuss whether the time is right for you to start a plan. Follow-up care is a key part of your treatment and safety. Be sure to make and go to all appointments, and call your doctor if you are having problems. It's also a good idea to know your test results and keep a list of the medicines you take. How can you care for yourself at home? Set realistic goals. Many people expect to lose much more weight than is likely. A weight loss of 5% to 10% of your body weight may be enough to improve your health. Get family and friends involved to provide support. Talk to them about why you are trying to lose weight, and ask them to help. They can help by participating in exercise and having meals with you, even if they may be eating something different. Find what works best for you. If you do not have time or do not like to cook, a program that offers meal replacement bars or shakes may be better for you. Or if you like to prepare meals, finding a plan that includes daily menus and recipes may be best.  Ask your doctor about other health professionals who can help you achieve your weight loss goals.   A dietitian can help you make healthy changes in your diet.  An exercise specialist or  can help you develop a safe and effective exercise program.  A counselor or psychiatrist can help you cope with issues such as depression, anxiety, or family problems that can make it hard to focus on weight loss. Consider joining a support group for people who are trying to lose weight. Your doctor can suggest groups in your area. Where can you learn more? Go to http://www.woods.com/ and enter U357 to learn more about \"Starting a Weight Loss Plan: Care Instructions. \"  Current as of: August 25, 2022               Content Version: 13.5  © 7094-0181 [employer]. Care instructions adapted under license by [facility] ([facility]). If you have questions about a medical condition or this instruction, always ask your healthcare professional. [address] any warranty or liability for your use of this information. A Healthy Heart: Care Instructions  Your Care Instructions     Coronary artery disease, also called heart disease, occurs when a substance called plaque builds up in the vessels that supply oxygen-rich blood to your heart muscle. This can narrow the blood vessels and reduce blood flow. A heart attack happens when blood flow is completely blocked. A high-fat diet, smoking, and other factors increase the risk of heart disease. Your doctor has found that you have a chance of having heart disease. You can do lots of things to keep your heart healthy. It may not be easy, but you can change your diet, exercise more, and quit smoking. These steps really work to lower your chance of heart disease. Follow-up care is a key part of your treatment and safety. Be sure to make and go to all appointments, and call your doctor if you are having problems. It's also a good idea to know your test results and keep a list of the medicines you take. How can you care for yourself at home?   Diet    Use less salt when you cook and eat. This helps lower your blood pressure. Taste food before salting. Add only a little salt when you think you need it. With time, your taste buds will adjust to less salt.     Eat fewer snack items, fast foods, canned soups, and other high-salt, high-fat, processed foods.     Read food labels and try to avoid saturated and trans fats. They increase your risk of heart disease by raising cholesterol levels.     Limit the amount of solid fat-butter, margarine, and shortening-you eat. Use olive, peanut, or canola oil when you cook. Bake, broil, and steam foods instead of frying them.     Eat a variety of fruit and vegetables every day. Dark green, deep orange, red, or yellow fruits and vegetables are especially good for you. Examples include spinach, carrots, peaches, and berries.     Foods high in fiber can reduce your cholesterol and provide important vitamins and minerals. High-fiber foods include whole-grain cereals and breads, oatmeal, beans, brown rice, citrus fruits, and apples.     Eat lean proteins. Heart-healthy proteins include seafood, lean meats and poultry, eggs, beans, peas, nuts, seeds, and soy products.     Limit drinks and foods with added sugar. These include candy, desserts, and soda pop. Lifestyle changes    If your doctor recommends it, get more exercise. Walking is a good choice. Bit by bit, increase the amount you walk every day. Try for at least 30 minutes on most days of the week. You also may want to swim, bike, or do other activities.     Do not smoke. If you need help quitting, talk to your doctor about stop-smoking programs and medicines. These can increase your chances of quitting for good. Quitting smoking may be the most important step you can take to protect your heart. It is never too late to quit.     Limit alcohol to 2 drinks a day for men and 1 drink a day for women.  Too much alcohol can cause health problems.     Manage other health problems such as diabetes, high blood pressure, and high cholesterol. If you think you may have a problem with alcohol or drug use, talk to your doctor. Medicines    Take your medicines exactly as prescribed. Call your doctor if you think you are having a problem with your medicine.     If your doctor recommends aspirin, take the amount directed each day. Make sure you take aspirin and not another kind of pain reliever, such as acetaminophen (Tylenol). When should you call for help? Call 911 if you have symptoms of a heart attack. These may include:    Chest pain or pressure, or a strange feeling in the chest.     Sweating.     Shortness of breath.     Pain, pressure, or a strange feeling in the back, neck, jaw, or upper belly or in one or both shoulders or arms.     Lightheadedness or sudden weakness.     A fast or irregular heartbeat. After you call 911, the  may tell you to chew 1 adult-strength or 2 to 4 low-dose aspirin. Wait for an ambulance. Do not try to drive yourself. Watch closely for changes in your health, and be sure to contact your doctor if you have any problems. Where can you learn more? Go to http://[employer].lemus.com/ and enter F075 to learn more about \"A Healthy Heart: Care Instructions. \"  Current as of: September 7, 2022               Content Version: 13.5  © 2006-2022 Healthwise, Incorporated. Care instructions adapted under license by [facility] ([facility]). If you have questions about a medical condition or this instruction, always ask your healthcare professional. [place] [address] any warranty or liability for your use of this information. Personalized Preventive Plan for Akhil Douglas - 2/2/2023  Medicare offers a range of preventive health benefits. Some of the tests and screenings are paid in full while other may be subject to a deductible, co-insurance, and/or copay.     Some of these benefits include a comprehensive review of your medical history including lifestyle, illnesses that may run in your family, and various assessments and screenings as appropriate. After reviewing your medical record and screening and assessments performed today your provider may have ordered immunizations, labs, imaging, and/or referrals for you. A list of these orders (if applicable) as well as your Preventive Care list are included within your After Visit Summary for your review. Other Preventive Recommendations:    A preventive eye exam performed by an eye specialist is recommended every 1-2 years to screen for glaucoma; cataracts, macular degeneration, and other eye disorders. A preventive dental visit is recommended every 6 months. Try to get at least 150 minutes of exercise per week or 10,000 steps per day on a pedometer . Order or download the FREE \"Exercise & Physical Activity: Your Everyday Guide\" from [employer]. Call 3-380.684.9393 or search [employer] online. You need 3366-9670 mg of calcium and 5659-4697 IU of vitamin D per day. It is possible to meet your calcium requirement with diet alone, but a vitamin D supplement is usually necessary to meet this goal.  When exposed to the sun, use a sunscreen that protects against both UVA and UVB radiation with an SPF of 30 or greater. Reapply every 2 to 3 hours or after sweating, drying off with a towel, or swimming. Always wear a seat belt when traveling in a car. Always wear a helmet when riding a bicycle or motorcycle.

## 2023-02-03 ENCOUNTER — HOSPITAL ENCOUNTER (OUTPATIENT)
Dept: RADIATION ONCOLOGY | Age: 73
Discharge: HOME | End: 2023-02-03
Payer: MEDICARE

## 2023-02-03 PROCEDURE — 77334 RADIATION TREATMENT AID(S): CPT | Performed by: RADIOLOGY

## 2023-02-03 PROCEDURE — 77332 RADIATION TREATMENT AID(S): CPT | Performed by: RADIOLOGY

## 2023-02-08 ENCOUNTER — HOSPITAL ENCOUNTER (OUTPATIENT)
Dept: RADIATION ONCOLOGY | Age: 73
Discharge: HOME | End: 2023-02-08
Payer: MEDICARE

## 2023-02-21 ENCOUNTER — HOSPITAL ENCOUNTER (OUTPATIENT)
Dept: RADIATION ONCOLOGY | Age: 73
Discharge: HOME | End: 2023-02-21
Payer: MEDICARE

## 2023-02-21 VITALS — BODY MASS INDEX: 35.07 KG/M2 | WEIGHT: 214 LBS

## 2023-02-21 PROCEDURE — 77385 HC NTSTY MODUL RAD TX DLVR SMPL: CPT | Performed by: RADIOLOGY

## 2023-02-21 ASSESSMENT — PAIN SCALES - GENERAL: PAINLEVEL_OUTOF10: 0

## 2023-02-21 NOTE — PROGRESS NOTES
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 Energy good. No skin issues. No problems. EXAM  Wt Readings from Last 3 Encounters:   02/21/23 214 lb (97.1 kg)   02/06/23 219 lb (99.3 kg)   02/02/23 214 lb (97.1 kg)     NAD    Setup images, chart, plan reviewed    A/P:   Tolerating RT well  Continue RT as planned  RTC 4-6 weeks at completion or sooner if needed.        Electronically signed by Gildardo Gutierres MD on 2/21/2023 at 3:36 PM

## 2023-02-22 ENCOUNTER — HOSPITAL ENCOUNTER (OUTPATIENT)
Dept: RADIATION ONCOLOGY | Age: 73
Discharge: HOME | End: 2023-02-22
Payer: MEDICARE

## 2023-02-22 PROCEDURE — 77385 HC NTSTY MODUL RAD TX DLVR SMPL: CPT | Performed by: RADIOLOGY

## 2023-02-22 PROCEDURE — 77014 CHG CT GUIDANCE RADIATION THERAPY FLDS PLACEMENT: CPT | Performed by: RADIOLOGY

## 2023-02-23 ENCOUNTER — HOSPITAL ENCOUNTER (OUTPATIENT)
Dept: RADIATION ONCOLOGY | Age: 73
Discharge: HOME | End: 2023-02-23
Payer: MEDICARE

## 2023-02-23 PROCEDURE — 77014 CHG CT GUIDANCE RADIATION THERAPY FLDS PLACEMENT: CPT | Performed by: RADIOLOGY

## 2023-02-23 PROCEDURE — 77385 HC NTSTY MODUL RAD TX DLVR SMPL: CPT | Performed by: RADIOLOGY

## 2023-02-24 ENCOUNTER — HOSPITAL ENCOUNTER (OUTPATIENT)
Dept: RADIATION ONCOLOGY | Age: 73
Discharge: HOME | End: 2023-02-24
Payer: MEDICARE

## 2023-02-24 PROCEDURE — 77385 HC NTSTY MODUL RAD TX DLVR SMPL: CPT | Performed by: RADIOLOGY

## 2023-02-27 ENCOUNTER — HOSPITAL ENCOUNTER (OUTPATIENT)
Dept: RADIATION ONCOLOGY | Age: 73
Discharge: HOME | End: 2023-02-27
Payer: MEDICARE

## 2023-02-27 PROCEDURE — 77014 CHG CT GUIDANCE RADIATION THERAPY FLDS PLACEMENT: CPT | Performed by: RADIOLOGY

## 2023-02-27 PROCEDURE — 77385 HC NTSTY MODUL RAD TX DLVR SMPL: CPT | Performed by: RADIOLOGY

## 2023-02-27 PROCEDURE — 77336 RADIATION PHYSICS CONSULT: CPT | Performed by: RADIOLOGY

## 2023-02-27 NOTE — PROGRESS NOTES
Radiation Oncology  Treatment Completion Summary  Encounter Date: 2023 3:15 PM    Ms. David Bryan is a 75 y.o. female  : 1950  MRN: [id number]  [facility] Number: [de-identified]      FOLLOW UP PHYSICIANS:   Primary Care: David Callahan MD     DIAGNOSIS: R breast mucinous carcinoma pT1cN0(sn)M0 ER/NM positive Her2 unamplified G2 ki67 10-15%     TREATMENT COURSE:     HISTORY:  David Bryan is a 75 y.o. female with the above referenced diagnosis. Complete details on history of present illness please see my initial consultation note. She has recently completed a course of adjuvant APBI and what follows is a description of the treatments she received:    ANATOMIC SITE: R partial breast  BEAM ORIENTATION: VMAT  ENERGY: 6MV photons  DOSE PER FRACTION: 6Gy  TOTAL DOSE: 30Gy    ELAPSED DAYS: 23 - 23    TREATMENT TOLERANCE:   Overall, the patient tolerated her radiation therapy quite well. Her energy level was fairly well-maintained throughout the course of her treatments. She did not develop skin erythema and no dry or moist desquamation. FOLLOW-UP PLANS:   She is to return to see me in 1 month or sooner as needed.     Electronically signed by Kalie Aguero MD on 2023 at 3:15 PM

## 2023-04-05 ENCOUNTER — HOSPITAL ENCOUNTER (OUTPATIENT)
Dept: INFUSION THERAPY | Age: 73
Discharge: HOME | End: 2023-04-05
Payer: MEDICARE

## 2023-04-05 ENCOUNTER — OFFICE VISIT (OUTPATIENT)
Dept: ONCOLOGY | Age: 73
End: 2023-04-05
Payer: MEDICARE

## 2023-04-05 VITALS
WEIGHT: 210.8 LBS | SYSTOLIC BLOOD PRESSURE: 150 MMHG | TEMPERATURE: 97.9 F | HEIGHT: 66 IN | OXYGEN SATURATION: 96 % | HEART RATE: 83 BPM | BODY MASS INDEX: 33.88 KG/M2 | DIASTOLIC BLOOD PRESSURE: 70 MMHG | RESPIRATION RATE: 16 BRPM

## 2023-04-05 DIAGNOSIS — C50.011 CARCINOMA OF AREOLA OF RIGHT BREAST IN FEMALE, ESTROGEN RECEPTOR POSITIVE (HCC): Primary | ICD-10-CM

## 2023-04-05 DIAGNOSIS — Z17.0 CARCINOMA OF AREOLA OF RIGHT BREAST IN FEMALE, ESTROGEN RECEPTOR POSITIVE (HCC): Primary | ICD-10-CM

## 2023-04-05 PROCEDURE — 99213 OFFICE O/P EST LOW 20 MIN: CPT | Performed by: INTERNAL MEDICINE

## 2023-04-05 PROCEDURE — G8417 CALC BMI ABV UP PARAM F/U: HCPCS | Performed by: INTERNAL MEDICINE

## 2023-04-05 PROCEDURE — G8399 PT W/DXA RESULTS DOCUMENT: HCPCS | Performed by: INTERNAL MEDICINE

## 2023-04-05 PROCEDURE — G9899 SCRN MAM PERF RSLTS DOC: HCPCS | Performed by: INTERNAL MEDICINE

## 2023-04-05 PROCEDURE — 99211 OFF/OP EST MAY X REQ PHY/QHP: CPT

## 2023-04-05 PROCEDURE — 1124F ACP DISCUSS-NO DSCNMKR DOCD: CPT | Performed by: INTERNAL MEDICINE

## 2023-04-05 PROCEDURE — G8427 DOCREV CUR MEDS BY ELIG CLIN: HCPCS | Performed by: INTERNAL MEDICINE

## 2023-04-05 PROCEDURE — 3017F COLORECTAL CA SCREEN DOC REV: CPT | Performed by: INTERNAL MEDICINE

## 2023-04-05 PROCEDURE — 1036F TOBACCO NON-USER: CPT | Performed by: INTERNAL MEDICINE

## 2023-04-05 PROCEDURE — 1090F PRES/ABSN URINE INCON ASSESS: CPT | Performed by: INTERNAL MEDICINE

## 2023-04-05 RX ORDER — LETROZOLE 2.5 MG/1
2.5 TABLET, FILM COATED ORAL DAILY
Qty: 30 TABLET | Refills: 5 | Status: SHIPPED | OUTPATIENT
Start: 2023-04-05

## 2023-04-05 NOTE — PROGRESS NOTES
MA Rooming Questions  Patient: Shira Tracey  MRN: [id number]    Date: 4/5/2023        1. Do you have any new issues?   no         2. Do you need any refills on medications?    no    3. Have you had any imaging done since your last visit?   no    4. Have you been hospitalized or seen in the emergency room since your last visit here?   no    5. Did the patient have a depression screening completed today?  No    No data recorded     PHQ-9 Given to (if applicable):               PHQ-9 Score (if applicable):                     [] Positive     []  Negative              Does question #9 need addressed (if applicable)                     [] Yes    []  No               Tracey Lane CMA
clubbing,   BREASTS: No palpable mass in bilateral breasts and axilla.        Labs:  Hematology:  Lab Results   Component Value Date    WBC 6.2 10/29/2021    RBC 5.23 10/29/2021    HGB 14.5 10/29/2021    HCT 45.5 10/29/2021    MCV 87.0 10/29/2021    MCH 27.7 10/29/2021    MCHC 31.9 (L) 10/29/2021    RDW 13.1 10/29/2021     10/29/2021    MPV 9.4 10/29/2021    SEGSPCT 70.6 (H) 10/29/2021    EOSRELPCT 1.8 10/29/2021    BASOPCT 0.8 10/29/2021    LYMPHOPCT 17.9 (L) 10/29/2021    MONOPCT 8.4 (H) 10/29/2021    SEGSABS 4.4 10/29/2021    EOSABS 0.1 10/29/2021    BASOSABS 0.1 10/29/2021    LYMPHSABS 1.1 10/29/2021    MONOSABS 0.5 10/29/2021    DIFFTYPE AUTOMATED DIFFERENTIAL 10/29/2021     Lab Results   Component Value Date    ESR 13 04/11/2014     Chemistry:  Lab Results   Component Value Date     11/03/2022    K 4.1 11/03/2022     11/03/2022    CO2 23 11/03/2022    BUN 20 11/03/2022    CREATININE 0.8 11/03/2022    GLUCOSE 139 (H) 05/06/2022    CALCIUM 9.2 11/03/2022    PROT 7.3 11/03/2022    LABALBU 4.0 11/03/2022    BILITOT 0.4 11/03/2022    ALKPHOS 147 (H) 11/03/2022    AST 22 11/03/2022    ALT 39 11/03/2022    LABGLOM >60 11/03/2022    GFRAA >60 05/06/2022    AGRATIO 1.5 07/20/2020    GLOB 3.0 07/20/2020    POCGLU 110 (H) 12/15/2022     No results found for: MMA, LDH, HOMOCYSTEINE  No results found for: LD  Lab Results   Component Value Date    TSHHS 1.500 10/29/2021     Immunology:  Lab Results   Component Value Date    PROT 7.3 11/03/2022     No results found for: Irvin Badillo, MAINORLCR  No results found for: B2M  Coagulation Panel:  Lab Results   Component Value Date    PROTIME 8.9 10/14/2012    INR 0.91 10/14/2012    APTT 20.5 (L) 10/14/2012     Anemia Panel:  No results found for: Teresa Pages, FOLATE  Tumor Markers:  No results found for: , CEA, , LABCA2, PSA     Observations:  No data recorded     Assessment   Right breast invasive mucinous carcinoma    Plan:  May Lange is a

## 2023-04-17 ENCOUNTER — OFFICE VISIT (OUTPATIENT)
Dept: SURGERY | Age: 73
End: 2023-04-17

## 2023-04-17 VITALS — SYSTOLIC BLOOD PRESSURE: 130 MMHG | OXYGEN SATURATION: 99 % | HEART RATE: 86 BPM | DIASTOLIC BLOOD PRESSURE: 80 MMHG

## 2023-04-17 DIAGNOSIS — C50.911 MUCINOUS CARCINOMA OF BREAST, RIGHT (HCC): Primary | ICD-10-CM

## 2023-04-17 DIAGNOSIS — Z85.3 HISTORY OF RIGHT BREAST CANCER: ICD-10-CM

## 2023-04-17 ASSESSMENT — ENCOUNTER SYMPTOMS
NAUSEA: 0
VOMITING: 0
COLOR CHANGE: 0
SHORTNESS OF BREATH: 0
ABDOMINAL PAIN: 0
WHEEZING: 0

## 2023-04-17 ASSESSMENT — PATIENT HEALTH QUESTIONNAIRE - PHQ9
2. FEELING DOWN, DEPRESSED OR HOPELESS: 1
SUM OF ALL RESPONSES TO PHQ QUESTIONS 1-9: 2
SUM OF ALL RESPONSES TO PHQ9 QUESTIONS 1 & 2: 2
SUM OF ALL RESPONSES TO PHQ QUESTIONS 1-9: 2
1. LITTLE INTEREST OR PLEASURE IN DOING THINGS: 1
SUM OF ALL RESPONSES TO PHQ QUESTIONS 1-9: 2
SUM OF ALL RESPONSES TO PHQ QUESTIONS 1-9: 2

## 2023-04-17 NOTE — PROGRESS NOTES
less likely a complicated cyst.  Follow-up in 6 months recommended with ultrasound    Attempted suicide [facility]) 2012    hanged herself. Brain neoplasm ([facility] [address]     grade II [place] resected 4/13, Dr. Greg Shirley resected tumor. Dr. Abimbola Ayon oncologist seeing pt also. No chemo. Dr. Greg Shirley following pt q year. Tumor recurred, right frontal craniotomy and tumor resection 11/14 Dr. Jose Vale. Had radiation to the brain     Chronic obstructive pulmonary disease ([facility] [address] 06/15/2016    PFT showed mod sees Dr Sarah Steiner 07/08/2022    positive home test    Cyst of left kidney 09/11/2014    MR I of 7/14. follow-up in 6-12 months    Cyst of pancreas 09/11/2014    Needs follow-up in one year 7/15    Depression     Diabetes mellitus ([facility] [address]     Family history of breast cancer in first degree relative 10/26/2022    Hearing loss     Hearing loss     Herniated disc     L4 &L5    History of external beam radiation therapy 2015    [place]    Hyperlipidemia     Kidney stones     Liver dysfunction 06/06/2014    In 7/14 hepatitis ABC negative. Immune studies negative, iron studies normal.  MRI of liver mild fatty liver.  F/u LFTS normal.    Osteopenia 09/26/2014    Sarcoidosis     Skin cancer     nose       Past Surgical History:    Past Surgical History:   Procedure Laterality Date    APPENDECTOMY      BREAST BIOPSY Right 03/05/2020    BREAST LESION BIOPSY EXCISION NEEDLE LOCALIZATION MASS RIGHT performed by Eddie Sanchez MD at [address] [facility] LUMPECTOMY Right 12/15/2022    RIGHT BREAST LUMPECTOMY PARTIAL MASTECTOMY WITH  WIRE LOCALIZATION, SENTINEL LYMPH NODE BIOPSY performed by Chan Preciado MD at [address] [facility] Right 03/05/2020     right breast mass removal by Dr. Charis Estrella in [address] Right 02/2022    CATARACT REMOVAL Right 04/2022    CHOLECYSTECTOMY  2006    COLONOSCOPY  2008    f/u 5 years- Dr Callum [place]  04/2013    resection of brain tumor Dr. Greg Shirley

## 2023-04-18 LAB
AVERAGE GLUCOSE: NORMAL
HBA1C MFR BLD: 6.8 %

## 2023-05-02 ENCOUNTER — OFFICE VISIT (OUTPATIENT)
Dept: INTERNAL MEDICINE CLINIC | Age: 73
End: 2023-05-02
Payer: MEDICARE

## 2023-05-02 VITALS
BODY MASS INDEX: 34.51 KG/M2 | TEMPERATURE: 97.6 F | WEIGHT: 210.6 LBS | DIASTOLIC BLOOD PRESSURE: 80 MMHG | SYSTOLIC BLOOD PRESSURE: 124 MMHG | RESPIRATION RATE: 16 BRPM | HEART RATE: 72 BPM | OXYGEN SATURATION: 95 %

## 2023-05-02 DIAGNOSIS — C71.9 OLIGOASTROCYTOMA, WHO GRADE 2 (HCC): ICD-10-CM

## 2023-05-02 DIAGNOSIS — D86.9 SARCOIDOSIS: ICD-10-CM

## 2023-05-02 DIAGNOSIS — E11.9 CONTROLLED TYPE 2 DIABETES MELLITUS WITHOUT COMPLICATION, WITHOUT LONG-TERM CURRENT USE OF INSULIN (HCC): Primary | ICD-10-CM

## 2023-05-02 DIAGNOSIS — Z17.0 CARCINOMA OF AREOLA OF RIGHT BREAST IN FEMALE, ESTROGEN RECEPTOR POSITIVE (HCC): ICD-10-CM

## 2023-05-02 DIAGNOSIS — C50.011 CARCINOMA OF AREOLA OF RIGHT BREAST IN FEMALE, ESTROGEN RECEPTOR POSITIVE (HCC): ICD-10-CM

## 2023-05-02 DIAGNOSIS — H91.93 BILATERAL HEARING LOSS, UNSPECIFIED HEARING LOSS TYPE: ICD-10-CM

## 2023-05-02 DIAGNOSIS — F32.A DEPRESSION, UNSPECIFIED DEPRESSION TYPE: ICD-10-CM

## 2023-05-02 DIAGNOSIS — E78.2 MIXED HYPERLIPIDEMIA: ICD-10-CM

## 2023-05-02 DIAGNOSIS — J44.9 CHRONIC OBSTRUCTIVE PULMONARY DISEASE, UNSPECIFIED COPD TYPE (HCC): ICD-10-CM

## 2023-05-02 PROBLEM — J06.9 VIRAL UPPER RESPIRATORY TRACT INFECTION: Status: RESOLVED | Noted: 2023-02-02 | Resolved: 2023-05-02

## 2023-05-02 PROCEDURE — 2022F DILAT RTA XM EVC RTNOPTHY: CPT | Performed by: INTERNAL MEDICINE

## 2023-05-02 PROCEDURE — G8399 PT W/DXA RESULTS DOCUMENT: HCPCS | Performed by: INTERNAL MEDICINE

## 2023-05-02 PROCEDURE — G9899 SCRN MAM PERF RSLTS DOC: HCPCS | Performed by: INTERNAL MEDICINE

## 2023-05-02 PROCEDURE — 1036F TOBACCO NON-USER: CPT | Performed by: INTERNAL MEDICINE

## 2023-05-02 PROCEDURE — 3023F SPIROM DOC REV: CPT | Performed by: INTERNAL MEDICINE

## 2023-05-02 PROCEDURE — 99214 OFFICE O/P EST MOD 30 MIN: CPT | Performed by: INTERNAL MEDICINE

## 2023-05-02 PROCEDURE — 3046F HEMOGLOBIN A1C LEVEL >9.0%: CPT | Performed by: INTERNAL MEDICINE

## 2023-05-02 PROCEDURE — G8427 DOCREV CUR MEDS BY ELIG CLIN: HCPCS | Performed by: INTERNAL MEDICINE

## 2023-05-02 PROCEDURE — 3017F COLORECTAL CA SCREEN DOC REV: CPT | Performed by: INTERNAL MEDICINE

## 2023-05-02 PROCEDURE — 1124F ACP DISCUSS-NO DSCNMKR DOCD: CPT | Performed by: INTERNAL MEDICINE

## 2023-05-02 PROCEDURE — 1090F PRES/ABSN URINE INCON ASSESS: CPT | Performed by: INTERNAL MEDICINE

## 2023-05-02 PROCEDURE — G8417 CALC BMI ABV UP PARAM F/U: HCPCS | Performed by: INTERNAL MEDICINE

## 2023-05-02 ASSESSMENT — ENCOUNTER SYMPTOMS
ALLERGIC/IMMUNOLOGIC NEGATIVE: 1
GASTROINTESTINAL NEGATIVE: 1
RESPIRATORY NEGATIVE: 1
EYES NEGATIVE: 1

## 2023-05-02 NOTE — PROGRESS NOTES
Clint Ruiz  Patient's  is 1950  Seen in office on 2023      SUBJECTIVE:  Gilbert gonzales 75 y. o.year old female presents today   Chief Complaint   Patient presents with    3 Month Follow-Up    Diabetes    Other     Sees Dr Hoda Toney, and Marco Hernandez    Pain     Left great toe, when lying down it \"feels like something is tearing\"     Patient is here for follow-up PTS, hyperlipidemia, COPD and brain tumor. Complaining of pain in the left great toe laterally. No soreness. No swelling. Patient has DM. No hypoglycemia. No numbness or weakness. No dizziness. Blood sugars are good at home. Patient has hyperlipidemia. Taking medications. No abdominal pain, no nausea or vomiting. No myalgias. Patient has sarcoidosis and COPD and is using inhaler. Uses albuterol only occasionally. But uses Anoro. Patient sees pulmonologist.  Patient to have a pulmonary function test near future. She has breast cancer and sees Dr. Keri Cullen. 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 Has been stable since then. Last MRI in 2023 showed no residual tumor. Patient sees Dr. Serge Dial. Patient denies any seizures. Taking medications regularly. No side effects noted. Review of Systems   Constitutional: Negative. HENT: Negative. Eyes: Negative. Respiratory: Negative. Cardiovascular: Negative. Gastrointestinal: Negative. Endocrine: Negative. Genitourinary: Negative. Musculoskeletal: Negative. Allergic/Immunologic: Negative. Neurological: Negative. Hematological: Negative. Psychiatric/Behavioral: Negative.        OBJECTIVE: /80   Pulse 72   Temp 97.6 °F (36.4 °C) (Temporal)   Resp 16   Wt 210 lb 9.6 oz (95.5 kg)   SpO2 95%   BMI 34.51 kg/m²     Wt Readings from Last 3 Encounters:   23 210 lb 9.6 oz (95.5 kg)   23 210 lb 3.2 oz (95.3 kg)   23 210 lb 12.8 oz (95.6 kg)      GENERAL: - Alert,

## 2023-05-19 RX ORDER — ATORVASTATIN CALCIUM 40 MG/1
40 TABLET, FILM COATED ORAL DAILY
Qty: 90 TABLET | Refills: 1 | Status: SHIPPED | OUTPATIENT
Start: 2023-05-19

## 2023-07-24 ENCOUNTER — OFFICE VISIT (OUTPATIENT)
Dept: SURGERY | Age: 73
End: 2023-07-24
Payer: MEDICARE

## 2023-07-24 VITALS
HEIGHT: 65 IN | DIASTOLIC BLOOD PRESSURE: 80 MMHG | OXYGEN SATURATION: 96 % | SYSTOLIC BLOOD PRESSURE: 130 MMHG | WEIGHT: 206.8 LBS | BODY MASS INDEX: 34.45 KG/M2 | HEART RATE: 88 BPM

## 2023-07-24 DIAGNOSIS — M85.80 OSTEOPENIA, UNSPECIFIED LOCATION: ICD-10-CM

## 2023-07-24 DIAGNOSIS — Z85.3 HISTORY OF RIGHT BREAST CANCER: Primary | ICD-10-CM

## 2023-07-24 DIAGNOSIS — Z80.3 FAMILY HISTORY OF BREAST CANCER IN FIRST DEGREE RELATIVE: ICD-10-CM

## 2023-07-24 DIAGNOSIS — Z79.811 AROMATASE INHIBITOR USE: ICD-10-CM

## 2023-07-24 PROCEDURE — 1036F TOBACCO NON-USER: CPT | Performed by: SURGERY

## 2023-07-24 PROCEDURE — G8427 DOCREV CUR MEDS BY ELIG CLIN: HCPCS | Performed by: SURGERY

## 2023-07-24 PROCEDURE — G9899 SCRN MAM PERF RSLTS DOC: HCPCS | Performed by: SURGERY

## 2023-07-24 PROCEDURE — G8417 CALC BMI ABV UP PARAM F/U: HCPCS | Performed by: SURGERY

## 2023-07-24 PROCEDURE — 1090F PRES/ABSN URINE INCON ASSESS: CPT | Performed by: SURGERY

## 2023-07-24 PROCEDURE — 1124F ACP DISCUSS-NO DSCNMKR DOCD: CPT | Performed by: SURGERY

## 2023-07-24 PROCEDURE — 99213 OFFICE O/P EST LOW 20 MIN: CPT | Performed by: SURGERY

## 2023-07-24 PROCEDURE — 3017F COLORECTAL CA SCREEN DOC REV: CPT | Performed by: SURGERY

## 2023-07-24 PROCEDURE — G8399 PT W/DXA RESULTS DOCUMENT: HCPCS | Performed by: SURGERY

## 2023-07-24 ASSESSMENT — ENCOUNTER SYMPTOMS
VOMITING: 0
SORE THROAT: 0
DIARRHEA: 0
ABDOMINAL DISTENTION: 0
SHORTNESS OF BREATH: 1
NAUSEA: 0
ABDOMINAL PAIN: 0
CONSTIPATION: 0
EYE DISCHARGE: 0
EYE REDNESS: 0
COLOR CHANGE: 0
CHEST TIGHTNESS: 0

## 2023-07-24 NOTE — PROGRESS NOTES
GENERAL SURGERY NOTE - Outpatient Progress  [facility]) Physicians    PATIENT: Isabel Suh 1950, 75 y.o., female   Date: 7/24/2023  MRN: [id number]   Requesting Provider:   No ref. provider found History Obtained From:  patient, electronic medical record     Reason for Evaluation & Chief Complaint:    Chief Complaint   Patient presents with    Post-Op Check    Follow-up     Lumpectomy, SLN bx 12/15/22       HISTORY OF PRESENT ILLNESS:      Isabel Suh is a 75 y.o. female presenting in follow up after RIGHT BREAST LUMPECTOMY PARTIAL MASTECTOMY WITH WIRE LOCALIZATION, SENTINEL LYMPH NODE BIOPSY 12/15/22. Since last seen, she has been doing very well. She is taking Femara, has noticed more sweating since she's on it, but denies other symptoms or side effects.      Breast Concerns: no  Nipple Drainage or Retraction: no  Skin Changes: no  Headaches: no  Vision Changes: no  Weight Change: no  Shortness of Breath or Chest Pain: no  Bone Pain: yes: arthritis in her foot  Lymphedema: no    Workup Includes:   Mammogram/US:   (None since surgery)  DEXA: 11/2/22 osteopenia  Sozo: prior WNL (unable to assess today in [facility])    Breast History, General  Endocrine Therapy: on letrozole/Femara, started 4/2023  Oncotype/Genetic Testing: negative  Chemotherapy: none for breast cancer, \"chemo pill to enhance radiation\" for her brain tumor  Estrogen/HRT: had a hysterectomy and ovaries removed, then took estrogen for ~10 years  Radiation/Environmental Exposure (eg mantle radiation): Hx of brain tumor \"oligo\", s/p surgery and RT (not to chest)  Age of menarche: 14  Age at birth of first child: 23  Menopausal status: Post-menopausal  Family History of breast cancer: yes:   FH of multiple cancers, sister w/ breast cancer at 45 (was on immunosuppression for kidney transplant), maternal aunt with breast cancer in her 80's     Right Breast History  Surgery, Date:   right breast lumpectomy with SLNB 12/15/22  Excision/biopsy

## 2023-07-31 ENCOUNTER — HOSPITAL ENCOUNTER (OUTPATIENT)
Age: 73
Discharge: HOME | End: 2023-07-31
Payer: MEDICARE

## 2023-07-31 LAB
ALBUMIN SERPL-MCNC: 4.3 GM/DL (ref 3.4–5)
ALP BLD-CCNC: 142 IU/L (ref 40–129)
ALT SERPL-CCNC: 39 U/L (ref 10–40)
ANION GAP SERPL CALCULATED.3IONS-SCNC: 12 MMOL/L (ref 4–16)
AST SERPL-CCNC: 21 IU/L (ref 15–37)
BILIRUB SERPL-MCNC: 0.5 MG/DL (ref 0–1)
BUN SERPL-MCNC: 20 MG/DL (ref 6–23)
CALCIUM SERPL-MCNC: 10 MG/DL (ref 8.3–10.6)
CHLORIDE BLD-SCNC: 101 MMOL/L (ref 99–110)
CHOLEST SERPL-MCNC: 258 MG/DL
CO2: 25 MMOL/L (ref 21–32)
CREAT SERPL-MCNC: 0.9 MG/DL (ref 0.6–1.1)
ESTIMATED AVERAGE GLUCOSE: 134 MG/DL
GFR SERPL CREATININE-BSD FRML MDRD: >60 ML/MIN/1.73M2
GLUCOSE SERPL-MCNC: 129 MG/DL (ref 70–99)
HBA1C MFR BLD: 6.3 % (ref 4.2–6.3)
HDLC SERPL-MCNC: 50 MG/DL
LDLC SERPL CALC-MCNC: 142 MG/DL
POTASSIUM SERPL-SCNC: 4.4 MMOL/L (ref 3.5–5.1)
SODIUM BLD-SCNC: 138 MMOL/L (ref 135–145)
TOTAL PROTEIN: 7.7 GM/DL (ref 6.4–8.2)
TRIGL SERPL-MCNC: 331 MG/DL

## 2023-07-31 PROCEDURE — 80053 COMPREHEN METABOLIC PANEL: CPT

## 2023-07-31 PROCEDURE — 36415 COLL VENOUS BLD VENIPUNCTURE: CPT

## 2023-07-31 PROCEDURE — 80061 LIPID PANEL: CPT

## 2023-07-31 PROCEDURE — 83036 HEMOGLOBIN GLYCOSYLATED A1C: CPT

## 2023-08-02 ENCOUNTER — OFFICE VISIT (OUTPATIENT)
Dept: INTERNAL MEDICINE CLINIC | Age: 73
End: 2023-08-02
Payer: MEDICARE

## 2023-08-02 VITALS
HEIGHT: 65 IN | SYSTOLIC BLOOD PRESSURE: 140 MMHG | HEART RATE: 86 BPM | BODY MASS INDEX: 34.52 KG/M2 | OXYGEN SATURATION: 97 % | DIASTOLIC BLOOD PRESSURE: 80 MMHG | WEIGHT: 207.2 LBS | RESPIRATION RATE: 16 BRPM

## 2023-08-02 DIAGNOSIS — Z17.0 CARCINOMA OF AREOLA OF RIGHT BREAST IN FEMALE, ESTROGEN RECEPTOR POSITIVE (HCC): ICD-10-CM

## 2023-08-02 DIAGNOSIS — J44.9 CHRONIC OBSTRUCTIVE PULMONARY DISEASE, UNSPECIFIED COPD TYPE (HCC): ICD-10-CM

## 2023-08-02 DIAGNOSIS — C71.9 OLIGOASTROCYTOMA, WHO GRADE 2 (HCC): ICD-10-CM

## 2023-08-02 DIAGNOSIS — H91.93 BILATERAL HEARING LOSS, UNSPECIFIED HEARING LOSS TYPE: ICD-10-CM

## 2023-08-02 DIAGNOSIS — C50.011 CARCINOMA OF AREOLA OF RIGHT BREAST IN FEMALE, ESTROGEN RECEPTOR POSITIVE (HCC): ICD-10-CM

## 2023-08-02 DIAGNOSIS — E78.2 MIXED HYPERLIPIDEMIA: ICD-10-CM

## 2023-08-02 DIAGNOSIS — E11.9 CONTROLLED TYPE 2 DIABETES MELLITUS WITHOUT COMPLICATION, WITHOUT LONG-TERM CURRENT USE OF INSULIN (HCC): Primary | ICD-10-CM

## 2023-08-02 DIAGNOSIS — F32.A DEPRESSION, UNSPECIFIED DEPRESSION TYPE: ICD-10-CM

## 2023-08-02 DIAGNOSIS — D86.9 SARCOIDOSIS: ICD-10-CM

## 2023-08-02 PROCEDURE — 3023F SPIROM DOC REV: CPT | Performed by: INTERNAL MEDICINE

## 2023-08-02 PROCEDURE — 3044F HG A1C LEVEL LT 7.0%: CPT | Performed by: INTERNAL MEDICINE

## 2023-08-02 PROCEDURE — G8399 PT W/DXA RESULTS DOCUMENT: HCPCS | Performed by: INTERNAL MEDICINE

## 2023-08-02 PROCEDURE — 1090F PRES/ABSN URINE INCON ASSESS: CPT | Performed by: INTERNAL MEDICINE

## 2023-08-02 PROCEDURE — G8427 DOCREV CUR MEDS BY ELIG CLIN: HCPCS | Performed by: INTERNAL MEDICINE

## 2023-08-02 PROCEDURE — 99214 OFFICE O/P EST MOD 30 MIN: CPT | Performed by: INTERNAL MEDICINE

## 2023-08-02 PROCEDURE — G8417 CALC BMI ABV UP PARAM F/U: HCPCS | Performed by: INTERNAL MEDICINE

## 2023-08-02 PROCEDURE — G9899 SCRN MAM PERF RSLTS DOC: HCPCS | Performed by: INTERNAL MEDICINE

## 2023-08-02 PROCEDURE — 2022F DILAT RTA XM EVC RTNOPTHY: CPT | Performed by: INTERNAL MEDICINE

## 2023-08-02 PROCEDURE — 1124F ACP DISCUSS-NO DSCNMKR DOCD: CPT | Performed by: INTERNAL MEDICINE

## 2023-08-02 PROCEDURE — 1036F TOBACCO NON-USER: CPT | Performed by: INTERNAL MEDICINE

## 2023-08-02 PROCEDURE — 3017F COLORECTAL CA SCREEN DOC REV: CPT | Performed by: INTERNAL MEDICINE

## 2023-08-02 RX ORDER — METFORMIN HYDROCHLORIDE 500 MG/1
500 TABLET, EXTENDED RELEASE ORAL
Qty: 90 TABLET | Refills: 1 | Status: SHIPPED | OUTPATIENT
Start: 2023-08-02

## 2023-08-02 RX ORDER — EZETIMIBE 10 MG/1
10 TABLET ORAL DAILY
Qty: 90 TABLET | Refills: 1 | Status: SHIPPED | OUTPATIENT
Start: 2023-08-02

## 2023-08-02 SDOH — ECONOMIC STABILITY: FOOD INSECURITY: WITHIN THE PAST 12 MONTHS, YOU WORRIED THAT YOUR FOOD WOULD RUN OUT BEFORE YOU GOT MONEY TO BUY MORE.: NEVER TRUE

## 2023-08-02 SDOH — ECONOMIC STABILITY: HOUSING INSECURITY
IN THE LAST 12 MONTHS, WAS THERE A TIME WHEN YOU DID NOT HAVE A STEADY PLACE TO SLEEP OR SLEPT IN A SHELTER (INCLUDING NOW)?: NO

## 2023-08-02 SDOH — ECONOMIC STABILITY: FOOD INSECURITY: WITHIN THE PAST 12 MONTHS, THE FOOD YOU BOUGHT JUST DIDN'T LAST AND YOU DIDN'T HAVE MONEY TO GET MORE.: NEVER TRUE

## 2023-08-02 SDOH — ECONOMIC STABILITY: INCOME INSECURITY: HOW HARD IS IT FOR YOU TO PAY FOR THE VERY BASICS LIKE FOOD, HOUSING, MEDICAL CARE, AND HEATING?: NOT HARD AT ALL

## 2023-08-11 ENCOUNTER — APPOINTMENT (OUTPATIENT)
Dept: CT IMAGING | Age: 73
End: 2023-08-11
Attending: EMERGENCY MEDICINE
Payer: MEDICARE

## 2023-08-11 ENCOUNTER — HOSPITAL ENCOUNTER (EMERGENCY)
Age: 73
Discharge: ANOTHER HOSPITAL | End: 2023-08-11
Attending: EMERGENCY MEDICINE
Payer: MEDICARE

## 2023-08-11 ENCOUNTER — APPOINTMENT (OUTPATIENT)
Dept: GENERAL RADIOLOGY | Age: 73
DRG: 872 | End: 2023-08-11
Attending: STUDENT IN AN ORGANIZED HEALTH CARE EDUCATION/TRAINING PROGRAM
Payer: MEDICARE

## 2023-08-11 ENCOUNTER — HOSPITAL ENCOUNTER (INPATIENT)
Age: 73
LOS: 2 days | Discharge: HOME | DRG: 872 | End: 2023-08-13
Attending: STUDENT IN AN ORGANIZED HEALTH CARE EDUCATION/TRAINING PROGRAM | Admitting: STUDENT IN AN ORGANIZED HEALTH CARE EDUCATION/TRAINING PROGRAM
Payer: MEDICARE

## 2023-08-11 ENCOUNTER — APPOINTMENT (OUTPATIENT)
Dept: INTERVENTIONAL RADIOLOGY/VASCULAR | Age: 73
DRG: 872 | End: 2023-08-11
Attending: STUDENT IN AN ORGANIZED HEALTH CARE EDUCATION/TRAINING PROGRAM
Payer: MEDICARE

## 2023-08-11 VITALS
HEART RATE: 65 BPM | WEIGHT: 207 LBS | HEIGHT: 65 IN | BODY MASS INDEX: 34.49 KG/M2 | RESPIRATION RATE: 15 BRPM | DIASTOLIC BLOOD PRESSURE: 66 MMHG | TEMPERATURE: 98 F | OXYGEN SATURATION: 97 % | SYSTOLIC BLOOD PRESSURE: 128 MMHG

## 2023-08-11 DIAGNOSIS — N20.1 URETEROLITHIASIS: Primary | ICD-10-CM

## 2023-08-11 DIAGNOSIS — N17.9 AKI (ACUTE KIDNEY INJURY) (HCC): ICD-10-CM

## 2023-08-11 PROBLEM — N20.9 UROLITHIASIS: Status: ACTIVE | Noted: 2023-08-11

## 2023-08-11 LAB
ALBUMIN SERPL-MCNC: 4 GM/DL (ref 3.4–5)
ALP BLD-CCNC: 277 IU/L (ref 40–129)
ALT SERPL-CCNC: 160 U/L (ref 10–40)
ANION GAP SERPL CALCULATED.3IONS-SCNC: 14 MMOL/L (ref 4–16)
ANION GAP SERPL CALCULATED.3IONS-SCNC: 16 MMOL/L (ref 4–16)
AST SERPL-CCNC: 103 IU/L (ref 15–37)
BACTERIA: ABNORMAL /HPF
BANDED NEUTROPHILS ABSOLUTE COUNT: 0.19 K/CU MM
BANDED NEUTROPHILS RELATIVE PERCENT: 2 % (ref 5–11)
BASOPHILS ABSOLUTE: 0 K/CU MM
BASOPHILS RELATIVE PERCENT: 0.4 % (ref 0–1)
BILIRUB SERPL-MCNC: 0.9 MG/DL (ref 0–1)
BILIRUBIN URINE: ABNORMAL MG/DL
BLOOD, URINE: ABNORMAL
BUN SERPL-MCNC: 25 MG/DL (ref 6–23)
BUN SERPL-MCNC: 26 MG/DL (ref 6–23)
CALCIUM SERPL-MCNC: 9.1 MG/DL (ref 8.3–10.6)
CALCIUM SERPL-MCNC: 9.4 MG/DL (ref 8.3–10.6)
CAST TYPE: ABNORMAL /HPF
CHLORIDE BLD-SCNC: 97 MMOL/L (ref 99–110)
CHLORIDE BLD-SCNC: 98 MMOL/L (ref 99–110)
CLARITY: CLEAR
CO2: 21 MMOL/L (ref 21–32)
CO2: 23 MMOL/L (ref 21–32)
COLOR: YELLOW
CREAT SERPL-MCNC: 1.5 MG/DL (ref 0.6–1.1)
CREAT SERPL-MCNC: 1.6 MG/DL (ref 0.6–1.1)
CRYSTAL TYPE: NEGATIVE /HPF
DIFFERENTIAL TYPE: ABNORMAL
DIFFERENTIAL TYPE: ABNORMAL
EOSINOPHILS ABSOLUTE: 0 K/CU MM
EOSINOPHILS RELATIVE PERCENT: 0.2 % (ref 0–3)
EPITHELIAL CELLS, UA: 5 /HPF
GFR SERPL CREATININE-BSD FRML MDRD: 34 ML/MIN/1.73M2
GFR SERPL CREATININE-BSD FRML MDRD: 36 ML/MIN/1.73M2
GLUCOSE BLD-MCNC: 102 MG/DL (ref 70–99)
GLUCOSE BLD-MCNC: 123 MG/DL (ref 70–99)
GLUCOSE BLD-MCNC: 98 MG/DL (ref 70–99)
GLUCOSE SERPL-MCNC: 119 MG/DL (ref 70–99)
GLUCOSE SERPL-MCNC: 145 MG/DL (ref 70–99)
GLUCOSE, URINE: NEGATIVE MG/DL
HCT VFR BLD CALC: 40.4 % (ref 37–47)
HCT VFR BLD CALC: 41.5 % (ref 37–47)
HEMOGLOBIN: 12.7 GM/DL (ref 12.5–16)
HEMOGLOBIN: 12.8 GM/DL (ref 12.5–16)
IMMATURE NEUTROPHIL %: 0.6 % (ref 0–0.43)
INR BLD: 1.1 INDEX
KETONES, URINE: NEGATIVE MG/DL
LACTATE: 0.9 MMOL/L (ref 0.5–1.9)
LACTATE: 1.7 MMOL/L (ref 0.5–1.9)
LEUKOCYTE ESTERASE, URINE: ABNORMAL
LYMPHOCYTES ABSOLUTE: 0.6 K/CU MM
LYMPHOCYTES ABSOLUTE: 0.6 K/CU MM
LYMPHOCYTES RELATIVE PERCENT: 5.6 % (ref 24–44)
LYMPHOCYTES RELATIVE PERCENT: 6 % (ref 24–44)
MCH RBC QN AUTO: 27 PG (ref 27–31)
MCH RBC QN AUTO: 27.4 PG (ref 27–31)
MCHC RBC AUTO-ENTMCNC: 30.6 % (ref 32–36)
MCHC RBC AUTO-ENTMCNC: 31.7 % (ref 32–36)
MCV RBC AUTO: 86.3 FL (ref 78–100)
MCV RBC AUTO: 88.1 FL (ref 78–100)
METAMYELOCYTES ABSOLUTE COUNT: 0.09 K/CU MM
METAMYELOCYTES PERCENT: 1 %
MONOCYTES ABSOLUTE: 0.3 K/CU MM
MONOCYTES ABSOLUTE: 1.2 K/CU MM
MONOCYTES RELATIVE PERCENT: 11.9 % (ref 0–4)
MONOCYTES RELATIVE PERCENT: 3 % (ref 0–4)
NITRITE URINE, QUANTITATIVE: NEGATIVE
PDW BLD-RTO: 13 % (ref 11.7–14.9)
PDW BLD-RTO: 13.2 % (ref 11.7–14.9)
PH, URINE: 6 (ref 5–8)
PLATELET # BLD: 228 K/CU MM (ref 140–440)
PLATELET # BLD: 249 K/CU MM (ref 140–440)
PMV BLD AUTO: 9.6 FL (ref 7.5–11.1)
PMV BLD AUTO: 9.8 FL (ref 7.5–11.1)
POTASSIUM SERPL-SCNC: 4.2 MMOL/L (ref 3.5–5.1)
POTASSIUM SERPL-SCNC: 4.5 MMOL/L (ref 3.5–5.1)
PROTEIN UA: 100 MG/DL
PROTHROMBIN TIME: 14.6 SECONDS (ref 11.7–14.5)
RBC # BLD: 4.68 M/CU MM (ref 4.2–5.4)
RBC # BLD: 4.71 M/CU MM (ref 4.2–5.4)
RBC URINE: 20 /HPF (ref 0–6)
SEGMENTED NEUTROPHILS ABSOLUTE COUNT: 8.1 K/CU MM
SEGMENTED NEUTROPHILS ABSOLUTE COUNT: 8.2 K/CU MM
SEGMENTED NEUTROPHILS RELATIVE PERCENT: 81.3 % (ref 36–66)
SEGMENTED NEUTROPHILS RELATIVE PERCENT: 88 % (ref 36–66)
SODIUM BLD-SCNC: 134 MMOL/L (ref 135–145)
SODIUM BLD-SCNC: 135 MMOL/L (ref 135–145)
SPECIFIC GRAVITY UA: 1.02 (ref 1–1.03)
TOTAL IMMATURE NEUTOROPHIL: 0.06 K/CU MM
TOTAL PROTEIN: 6.7 GM/DL (ref 6.4–8.2)
UROBILINOGEN, URINE: 1 MG/DL (ref 0.2–1)
WBC # BLD: 9.4 K/CU MM (ref 4–10.5)
WBC # BLD: 9.9 K/CU MM (ref 4–10.5)
WBC UA: 50 /HPF (ref 0–5)

## 2023-08-11 PROCEDURE — 87077 CULTURE AEROBIC IDENTIFY: CPT

## 2023-08-11 PROCEDURE — 85007 BL SMEAR W/DIFF WBC COUNT: CPT

## 2023-08-11 PROCEDURE — 2580000003 HC RX 258: Performed by: STUDENT IN AN ORGANIZED HEALTH CARE EDUCATION/TRAINING PROGRAM

## 2023-08-11 PROCEDURE — 85610 PROTHROMBIN TIME: CPT

## 2023-08-11 PROCEDURE — 80053 COMPREHEN METABOLIC PANEL: CPT

## 2023-08-11 PROCEDURE — 99285 EMERGENCY DEPT VISIT HI MDM: CPT

## 2023-08-11 PROCEDURE — 74018 RADEX ABDOMEN 1 VIEW: CPT

## 2023-08-11 PROCEDURE — 6370000000 HC RX 637 (ALT 250 FOR IP): Performed by: STUDENT IN AN ORGANIZED HEALTH CARE EDUCATION/TRAINING PROGRAM

## 2023-08-11 PROCEDURE — 6360000002 HC RX W HCPCS: Performed by: EMERGENCY MEDICINE

## 2023-08-11 PROCEDURE — 85027 COMPLETE CBC AUTOMATED: CPT

## 2023-08-11 PROCEDURE — 82962 GLUCOSE BLOOD TEST: CPT

## 2023-08-11 PROCEDURE — 2060000000 HC ICU INTERMEDIATE R&B

## 2023-08-11 PROCEDURE — 87040 BLOOD CULTURE FOR BACTERIA: CPT

## 2023-08-11 PROCEDURE — 80048 BASIC METABOLIC PNL TOTAL CA: CPT

## 2023-08-11 PROCEDURE — 83605 ASSAY OF LACTIC ACID: CPT

## 2023-08-11 PROCEDURE — 82805 BLOOD GASES W/O2 SATURATION: CPT

## 2023-08-11 PROCEDURE — 74176 CT ABD & PELVIS W/O CONTRAST: CPT

## 2023-08-11 PROCEDURE — 2580000003 HC RX 258: Performed by: EMERGENCY MEDICINE

## 2023-08-11 PROCEDURE — 85025 COMPLETE CBC W/AUTO DIFF WBC: CPT

## 2023-08-11 PROCEDURE — 50432 PLMT NEPHROSTOMY CATHETER: CPT

## 2023-08-11 PROCEDURE — 6360000002 HC RX W HCPCS: Performed by: STUDENT IN AN ORGANIZED HEALTH CARE EDUCATION/TRAINING PROGRAM

## 2023-08-11 PROCEDURE — 36415 COLL VENOUS BLD VENIPUNCTURE: CPT

## 2023-08-11 PROCEDURE — 94761 N-INVAS EAR/PLS OXIMETRY MLT: CPT

## 2023-08-11 PROCEDURE — 0T9330Z DRAINAGE OF RIGHT KIDNEY PELVIS WITH DRAINAGE DEVICE, PERCUTANEOUS APPROACH: ICD-10-PCS | Performed by: RADIOLOGY

## 2023-08-11 PROCEDURE — 71045 X-RAY EXAM CHEST 1 VIEW: CPT

## 2023-08-11 PROCEDURE — 87086 URINE CULTURE/COLONY COUNT: CPT

## 2023-08-11 PROCEDURE — 81001 URINALYSIS AUTO W/SCOPE: CPT

## 2023-08-11 PROCEDURE — 93005 ELECTROCARDIOGRAM TRACING: CPT

## 2023-08-11 PROCEDURE — C1729 CATH, DRAINAGE: HCPCS

## 2023-08-11 PROCEDURE — 96365 THER/PROPH/DIAG IV INF INIT: CPT

## 2023-08-11 RX ORDER — ATORVASTATIN CALCIUM 40 MG/1
40 TABLET, FILM COATED ORAL DAILY
Status: DISCONTINUED | OUTPATIENT
Start: 2023-08-11 | End: 2023-08-13 | Stop reason: HOSPADM

## 2023-08-11 RX ORDER — SODIUM CHLORIDE 9 MG/ML
INJECTION, SOLUTION INTRAVENOUS CONTINUOUS
Status: DISPENSED | OUTPATIENT
Start: 2023-08-11 | End: 2023-08-11

## 2023-08-11 RX ORDER — SODIUM CHLORIDE 9 MG/ML
INJECTION, SOLUTION INTRAVENOUS PRN
Status: DISCONTINUED | OUTPATIENT
Start: 2023-08-11 | End: 2023-08-13 | Stop reason: HOSPADM

## 2023-08-11 RX ORDER — INSULIN LISPRO 100 [IU]/ML
0-4 INJECTION, SOLUTION INTRAVENOUS; SUBCUTANEOUS NIGHTLY
Status: DISCONTINUED | OUTPATIENT
Start: 2023-08-11 | End: 2023-08-13 | Stop reason: HOSPADM

## 2023-08-11 RX ORDER — POLYETHYLENE GLYCOL 3350 17 G/17G
17 POWDER, FOR SOLUTION ORAL DAILY PRN
Status: DISCONTINUED | OUTPATIENT
Start: 2023-08-11 | End: 2023-08-13 | Stop reason: HOSPADM

## 2023-08-11 RX ORDER — CALCIUM CARBONATE 500(1250)
500 TABLET ORAL 2 TIMES DAILY
Status: DISCONTINUED | OUTPATIENT
Start: 2023-08-11 | End: 2023-08-13 | Stop reason: HOSPADM

## 2023-08-11 RX ORDER — ONDANSETRON 2 MG/ML
4 INJECTION INTRAMUSCULAR; INTRAVENOUS EVERY 6 HOURS PRN
Status: DISCONTINUED | OUTPATIENT
Start: 2023-08-11 | End: 2023-08-13 | Stop reason: HOSPADM

## 2023-08-11 RX ORDER — ACETAMINOPHEN 650 MG/1
650 SUPPOSITORY RECTAL EVERY 6 HOURS PRN
Status: DISCONTINUED | OUTPATIENT
Start: 2023-08-11 | End: 2023-08-13 | Stop reason: HOSPADM

## 2023-08-11 RX ORDER — LETROZOLE 2.5 MG/1
2.5 TABLET, FILM COATED ORAL DAILY
Status: DISCONTINUED | OUTPATIENT
Start: 2023-08-11 | End: 2023-08-13 | Stop reason: HOSPADM

## 2023-08-11 RX ORDER — SODIUM CHLORIDE 0.9 % (FLUSH) 0.9 %
5-40 SYRINGE (ML) INJECTION EVERY 12 HOURS SCHEDULED
Status: DISCONTINUED | OUTPATIENT
Start: 2023-08-11 | End: 2023-08-13 | Stop reason: HOSPADM

## 2023-08-11 RX ORDER — INSULIN LISPRO 100 [IU]/ML
0-4 INJECTION, SOLUTION INTRAVENOUS; SUBCUTANEOUS
Status: DISCONTINUED | OUTPATIENT
Start: 2023-08-11 | End: 2023-08-13 | Stop reason: HOSPADM

## 2023-08-11 RX ORDER — SODIUM CHLORIDE 9 MG/ML
INJECTION, SOLUTION INTRAVENOUS CONTINUOUS
Status: DISCONTINUED | OUTPATIENT
Start: 2023-08-11 | End: 2023-08-11

## 2023-08-11 RX ORDER — ENOXAPARIN SODIUM 100 MG/ML
40 INJECTION SUBCUTANEOUS DAILY
Status: DISCONTINUED | OUTPATIENT
Start: 2023-08-11 | End: 2023-08-13 | Stop reason: HOSPADM

## 2023-08-11 RX ORDER — GLUCAGON 1 MG/ML
1 KIT INJECTION PRN
Status: DISCONTINUED | OUTPATIENT
Start: 2023-08-11 | End: 2023-08-13 | Stop reason: HOSPADM

## 2023-08-11 RX ORDER — ONDANSETRON 4 MG/1
4 TABLET, ORALLY DISINTEGRATING ORAL EVERY 8 HOURS PRN
Status: DISCONTINUED | OUTPATIENT
Start: 2023-08-11 | End: 2023-08-13 | Stop reason: HOSPADM

## 2023-08-11 RX ORDER — KETOROLAC TROMETHAMINE 30 MG/ML
15 INJECTION, SOLUTION INTRAMUSCULAR; INTRAVENOUS EVERY 6 HOURS PRN
Status: DISCONTINUED | OUTPATIENT
Start: 2023-08-11 | End: 2023-08-12

## 2023-08-11 RX ORDER — DEXTROSE MONOHYDRATE 100 MG/ML
INJECTION, SOLUTION INTRAVENOUS CONTINUOUS PRN
Status: DISCONTINUED | OUTPATIENT
Start: 2023-08-11 | End: 2023-08-13 | Stop reason: HOSPADM

## 2023-08-11 RX ORDER — ESCITALOPRAM OXALATE 10 MG/1
20 TABLET ORAL DAILY
Status: DISCONTINUED | OUTPATIENT
Start: 2023-08-11 | End: 2023-08-13 | Stop reason: HOSPADM

## 2023-08-11 RX ORDER — EZETIMIBE 10 MG/1
10 TABLET ORAL DAILY
Status: DISCONTINUED | OUTPATIENT
Start: 2023-08-11 | End: 2023-08-13 | Stop reason: HOSPADM

## 2023-08-11 RX ORDER — SODIUM CHLORIDE 0.9 % (FLUSH) 0.9 %
5-40 SYRINGE (ML) INJECTION PRN
Status: DISCONTINUED | OUTPATIENT
Start: 2023-08-11 | End: 2023-08-13 | Stop reason: HOSPADM

## 2023-08-11 RX ORDER — ACETAMINOPHEN 325 MG/1
650 TABLET ORAL EVERY 6 HOURS PRN
Status: DISCONTINUED | OUTPATIENT
Start: 2023-08-11 | End: 2023-08-13 | Stop reason: HOSPADM

## 2023-08-11 RX ADMIN — ENOXAPARIN SODIUM 40 MG: 100 INJECTION SUBCUTANEOUS at 17:38

## 2023-08-11 RX ADMIN — SODIUM CHLORIDE: 9 INJECTION, SOLUTION INTRAVENOUS at 20:44

## 2023-08-11 RX ADMIN — CEFTRIAXONE SODIUM 1000 MG: 1 INJECTION, POWDER, FOR SOLUTION INTRAMUSCULAR; INTRAVENOUS at 12:47

## 2023-08-11 RX ADMIN — SODIUM CHLORIDE 1000 ML: 9 INJECTION, SOLUTION INTRAVENOUS at 17:37

## 2023-08-11 RX ADMIN — EZETIMIBE 10 MG: 10 TABLET ORAL at 17:38

## 2023-08-11 RX ADMIN — ACETAMINOPHEN 650 MG: 325 TABLET ORAL at 19:40

## 2023-08-11 RX ADMIN — LETROZOLE 2.5 MG: 2.5 TABLET ORAL at 17:46

## 2023-08-11 RX ADMIN — ONDANSETRON 4 MG: 2 INJECTION INTRAMUSCULAR; INTRAVENOUS at 19:42

## 2023-08-11 RX ADMIN — ATORVASTATIN CALCIUM 40 MG: 40 TABLET, FILM COATED ORAL at 17:38

## 2023-08-11 RX ADMIN — ESCITALOPRAM OXALATE 20 MG: 10 TABLET ORAL at 17:38

## 2023-08-11 RX ADMIN — CALCIUM 500 MG: 500 TABLET ORAL at 21:49

## 2023-08-11 RX ADMIN — CEFTRIAXONE SODIUM 2000 MG: 2 INJECTION, POWDER, FOR SOLUTION INTRAMUSCULAR; INTRAVENOUS at 21:49

## 2023-08-11 RX ADMIN — KETOROLAC TROMETHAMINE 15 MG: 30 INJECTION, SOLUTION INTRAMUSCULAR; INTRAVENOUS at 19:40

## 2023-08-11 ASSESSMENT — PAIN - FUNCTIONAL ASSESSMENT: PAIN_FUNCTIONAL_ASSESSMENT: PREVENTS OR INTERFERES SOME ACTIVE ACTIVITIES AND ADLS

## 2023-08-11 ASSESSMENT — PAIN SCALES - GENERAL
PAINLEVEL_OUTOF10: 9
PAINLEVEL_OUTOF10: 9
PAINLEVEL_OUTOF10: 0
PAINLEVEL_OUTOF10: 0

## 2023-08-11 ASSESSMENT — PAIN DESCRIPTION - LOCATION: LOCATION: ABDOMEN;BACK

## 2023-08-11 ASSESSMENT — PAIN DESCRIPTION - DESCRIPTORS: DESCRIPTORS: STABBING

## 2023-08-11 NOTE — BRIEF OP NOTE
Brief Postoperative Note      Patient: Arabella Gonzalez  YOB: 1950  MRN: [id number]    Date of Procedure: 8/11/2023    * No Diagnosis Codes entered *right hydronephrosis due to obstructing stone    Post-Op Diagnosis: Same       * No procedures listed *antegrade right nephrostogram and 8fr nephrostomy tube placement    * No surgeons listed *michelle pitt do    Assistant:  * No surgical staff found *    Anesthesia: * No anesthesia type entered *local    Estimated Blood Loss (mL): Minimal    Complications: None    Specimens:   * No specimens in log *20cc frankly purulent urine sent for culture    Implants:  * No implants in log *      Drains:   Nephrostomy Tube 1 Right Flank 8 fr (Active)       Findings: right hydronephrosis and pyonephrosis. 8fr external drainage nephrostomy tube curled in right renal pelvis. Irrigation/aspiration 100 sterile ns in 10ml aliquots. External drainage bag applied.       Electronically signed by Cecilia Mosher DO on 8/11/2023 at 5:19 PM

## 2023-08-11 NOTE — PROGRESS NOTES
Patient arrived from [facility] via stretcher, Oriented to room and staff. Bedside table and call light in reach. Provider informed of arrival and orders requested. IR informed of patients arrival also, they will be up today to do procedure, patient to remain NPO until then.

## 2023-08-11 NOTE — H&P
V2.0  History and Physical      Name:  Augusto Ruiz /Age/Sex: 1950  (75 y.o. female)   MRN & CSN:  [id number] & [id number] Encounter Date/Time: 2023 3:45 PM EDT   Location:  [address] [place] [address] PCP: Franky Gee MD       Hospital Day: 1    Assessment and Plan:   Augusto Ruiz is a 75 y.o. female  who presents with Ureterolithiasis    1. Urolithiasis  -Presenting with right flank pain  -CT of the abdomen and pelvis showLarge 2 cm hyperdense stone seen within the right ureteropelvic junction with severe upstream hydronephrosis and perinephric stranding  -Urology consulted  -IR to place nephrostomy tube  -Started on antibiotics  -Continue supportive care with IV hydration/antiemetics/analgesics    2. STEFANO  -Likely secondary to above  -Creatinine on presentation 1.6. Baseline under 1  -Adequate hydration  -Avoid nephrotoxins  -Monitor electrolytes    3. Type 2 diabetes mellitus  -Hold home oral antidiabetics  -SSI/Lantus  -Hypoglycemic protocol  -Point-of-care glucose checks    4. Hyperlipidemia  -Resume statins    5. Carcinoma of areola of right breast  -Status post [id number]/2022 and radiation txt  -On hormonal therapy  -Follows up with Dr. Simone Garza    6. Sarcoidosis  -Continue home inhalers  -Follows up with pulmonology    7. Oligoastrocytoma  -Status post resection   -Follows up with neurosurgery as outpatient    8. COPD  -Not in acute exacerbation  -Continue home inhalers    9.   Depression  -Continue [place] Problems             Last Modified POA    * (Principal) Ureterolithiasis 2023 Yes    Urolithiasis 2023 Yes           Disposition:   Current Living situation: home  Expected Disposition: home  Estimated D/C: home    Diet Diet NPO   DVT Prophylaxis [] Lovenox, []  Heparin, [] SCDs, [] Ambulation,  [] Eliquis, [] Xarelto, [] Coumadin   Code Status Full Code   Surrogate Decision Maker/ POA      Personally reviewed Lab Studies and Imaging       History from:

## 2023-08-11 NOTE — ED PROVIDER NOTES
Triage Chief Complaint:    Flank Pain (Pt states she didn't want to come but her  said he was going to call the squad if she didn't. Pt states since Tuesday she has been having pain on/off in both flanks. Pt reports history of kidney stones but that pain was way worse. Pt denies pain at this time. )    RAYMOND Cao is a 75 y.o. female that presents for evaluation of bilateral flank pain has been intermittent. She is worried she might have another kidney stone. Her right side seem to be hurting more than her left side. She feels as though the right side does radiate little bit to the right lower abdomen. She did have some associated nausea earlier but does not nauseated right now. No bloody or bilious emesis. Has not noticed any change to bowel movements. She also reports urinary hesitancy but no dysuria. No recent antibiotics. No trauma. Denies numbness tingling or weakness. Has not noticed any overlying skin changes. Has still been able to ambulate without significant difficulty. History from : Patient    Limitations to history : None    ROS:  10 systems reviewed and negative except as above. Past Medical History:   Diagnosis Date    Abnormal mammogram 08/14/2017    0.4 cm hypoechoic mass(most likely a small intramammary lymph node or less likely a complicated cyst.  Follow-up in 6 months recommended with ultrasound    Aromatase inhibitor use 7/24/2023    Attempted suicide [facility]) 2012    hanged herself. Brain neoplasm ([address] [facility])     grade II Oligo resected 4/13, Dr. Barber Neither resected tumor. Dr. Erich Lennon oncologist seeing pt also. No chemo. Dr. Barber Neither following pt q year. Tumor recurred, right frontal craniotomy and tumor resection 11/14 Dr. Blake Carlisle. Had radiation to the brain     Chronic obstructive pulmonary disease ([address]) 06/15/2016    PFT showed mod sees Dr Roro Damon 07/08/2022    positive home test    Cyst of left kidney 09/11/2014    MR I of 7/14.  follow-up in 6-12 months

## 2023-08-11 NOTE — PROCEDURES
Right 8 Fr. Nephrostomy tube placed in posterior right flank area. Pt. tolerated procedure without difficulty. 20cc thick, tan fluid/urine collected and sent to lab for culture. Dressing placed and tube secured/connected to drng bag under sterile conditions. Pt. transported back to room. Bedside report given to RN.

## 2023-08-11 NOTE — PROGRESS NOTES
TRANSFER - OUT REPORT:    Verbal report given to Dipak RN on Severino Govea being transferred to [facility] for routine post-op       Report consisted of patient's Situation, Background, Assessment and   Recommendations(SBAR). Information from the following report(s) Nurse Handoff Report was reviewed with the receiving nurse. Opportunity for questions and clarification was provided.       Patient transported with:   Registered Nurse

## 2023-08-11 NOTE — PROGRESS NOTES
4 Eyes Skin Assessment     NAME:  Chevy Apodaca  YOB: 1950  MEDICAL RECORD NUMBER:  [id number]    The patient is being assessed for  Admission    I agree that at least one RN has performed a thorough Head to Toe Skin Assessment on the patient. ALL assessment sites listed below have been assessed. Areas assessed by both nurses:    Head, Face, Ears, Shoulders, Back, Chest, Arms, Elbows, Hands, Sacrum. Buttock, Coccyx, Ischium, and Legs. Feet and Heels        Does the Patient have a Wound?  No noted wound(s)       Luis Prevention initiated by RN: Yes  Wound Care Orders initiated by RN: No    Pressure Injury (Stage 3,4, Unstageable, DTI, NWPT, and Complex wounds) if present, place Wound referral order by RN under : No    New Ostomies, if present place, Ostomy referral order under : No     Nurse 1 eSignature: Electronically signed by Katie Mancilla LPN on 5/73/88 at 7:65 PM EDT    **SHARE this note so that the co-signing nurse can place an eSignature**    Nurse 2 eSignature: Electronically signed by Claudia Mcmanus RN on 8/11/23 at 3:57 PM EDT

## 2023-08-12 LAB
ANION GAP SERPL CALCULATED.3IONS-SCNC: 14 MMOL/L (ref 4–16)
BASOPHILS ABSOLUTE: 0.1 K/CU MM
BASOPHILS RELATIVE PERCENT: 0.3 % (ref 0–1)
BUN SERPL-MCNC: 25 MG/DL (ref 6–23)
CALCIUM SERPL-MCNC: 8.4 MG/DL (ref 8.3–10.6)
CHLORIDE BLD-SCNC: 102 MMOL/L (ref 99–110)
CO2: 21 MMOL/L (ref 21–32)
CREAT SERPL-MCNC: 1.5 MG/DL (ref 0.6–1.1)
CULTURE: ABNORMAL
CULTURE: ABNORMAL
CULTURE: NORMAL
DIFFERENTIAL TYPE: ABNORMAL
EOSINOPHILS ABSOLUTE: 0 K/CU MM
EOSINOPHILS RELATIVE PERCENT: 0.1 % (ref 0–3)
GFR SERPL CREATININE-BSD FRML MDRD: 36 ML/MIN/1.73M2
GLUCOSE BLD-MCNC: 102 MG/DL (ref 70–99)
GLUCOSE BLD-MCNC: 130 MG/DL (ref 70–99)
GLUCOSE BLD-MCNC: 91 MG/DL (ref 70–99)
GLUCOSE SERPL-MCNC: 187 MG/DL (ref 70–99)
HCT VFR BLD CALC: 35.8 % (ref 37–47)
HEMOGLOBIN: 11.5 GM/DL (ref 12.5–16)
IMMATURE NEUTROPHIL %: 0.8 % (ref 0–0.43)
LYMPHOCYTES ABSOLUTE: 0.4 K/CU MM
LYMPHOCYTES RELATIVE PERCENT: 2.4 % (ref 24–44)
Lab: ABNORMAL
Lab: NORMAL
MCH RBC QN AUTO: 27.9 PG (ref 27–31)
MCHC RBC AUTO-ENTMCNC: 32.1 % (ref 32–36)
MCV RBC AUTO: 86.9 FL (ref 78–100)
MONOCYTES ABSOLUTE: 1 K/CU MM
MONOCYTES RELATIVE PERCENT: 5.5 % (ref 0–4)
NUCLEATED RBC %: 0 %
PDW BLD-RTO: 13.2 % (ref 11.7–14.9)
PLATELET # BLD: 244 K/CU MM (ref 140–440)
PMV BLD AUTO: 9.9 FL (ref 7.5–11.1)
POTASSIUM SERPL-SCNC: 4.1 MMOL/L (ref 3.5–5.1)
RBC # BLD: 4.12 M/CU MM (ref 4.2–5.4)
SEGMENTED NEUTROPHILS ABSOLUTE COUNT: 16.7 K/CU MM
SEGMENTED NEUTROPHILS RELATIVE PERCENT: 90.9 % (ref 36–66)
SODIUM BLD-SCNC: 137 MMOL/L (ref 135–145)
SPECIMEN: ABNORMAL
SPECIMEN: NORMAL
TOTAL IMMATURE NEUTOROPHIL: 0.14 K/CU MM
TOTAL NUCLEATED RBC: 0 K/CU MM
WBC # BLD: 18.3 K/CU MM (ref 4–10.5)

## 2023-08-12 PROCEDURE — 2580000003 HC RX 258: Performed by: STUDENT IN AN ORGANIZED HEALTH CARE EDUCATION/TRAINING PROGRAM

## 2023-08-12 PROCEDURE — 2580000003 HC RX 258: Performed by: INTERNAL MEDICINE

## 2023-08-12 PROCEDURE — 6370000000 HC RX 637 (ALT 250 FOR IP): Performed by: STUDENT IN AN ORGANIZED HEALTH CARE EDUCATION/TRAINING PROGRAM

## 2023-08-12 PROCEDURE — 6360000002 HC RX W HCPCS: Performed by: STUDENT IN AN ORGANIZED HEALTH CARE EDUCATION/TRAINING PROGRAM

## 2023-08-12 PROCEDURE — 85025 COMPLETE CBC W/AUTO DIFF WBC: CPT

## 2023-08-12 PROCEDURE — 85018 HEMOGLOBIN: CPT

## 2023-08-12 PROCEDURE — 94640 AIRWAY INHALATION TREATMENT: CPT

## 2023-08-12 PROCEDURE — 94761 N-INVAS EAR/PLS OXIMETRY MLT: CPT

## 2023-08-12 PROCEDURE — 82962 GLUCOSE BLOOD TEST: CPT

## 2023-08-12 PROCEDURE — 2060000000 HC ICU INTERMEDIATE R&B

## 2023-08-12 PROCEDURE — 36415 COLL VENOUS BLD VENIPUNCTURE: CPT

## 2023-08-12 PROCEDURE — 85014 HEMATOCRIT: CPT

## 2023-08-12 PROCEDURE — 80048 BASIC METABOLIC PNL TOTAL CA: CPT

## 2023-08-12 RX ORDER — ALBUTEROL SULFATE 2.5 MG/3ML
2.5 SOLUTION RESPIRATORY (INHALATION) EVERY 4 HOURS PRN
Status: DISCONTINUED | OUTPATIENT
Start: 2023-08-12 | End: 2023-08-13 | Stop reason: HOSPADM

## 2023-08-12 RX ORDER — ALBUTEROL SULFATE 90 UG/1
2 AEROSOL, METERED RESPIRATORY (INHALATION) EVERY 4 HOURS PRN
Status: DISCONTINUED | OUTPATIENT
Start: 2023-08-12 | End: 2023-08-13 | Stop reason: HOSPADM

## 2023-08-12 RX ORDER — 0.9 % SODIUM CHLORIDE 0.9 %
500 INTRAVENOUS SOLUTION INTRAVENOUS ONCE
Status: COMPLETED | OUTPATIENT
Start: 2023-08-12 | End: 2023-08-12

## 2023-08-12 RX ADMIN — LETROZOLE 2.5 MG: 2.5 TABLET ORAL at 09:42

## 2023-08-12 RX ADMIN — ALBUTEROL SULFATE 2 PUFF: 90 AEROSOL, METERED RESPIRATORY (INHALATION) at 03:58

## 2023-08-12 RX ADMIN — TIOTROPIUM BROMIDE AND OLODATEROL 2 PUFF: 3.124; 2.736 SPRAY, METERED RESPIRATORY (INHALATION) at 08:36

## 2023-08-12 RX ADMIN — ENOXAPARIN SODIUM 40 MG: 100 INJECTION SUBCUTANEOUS at 09:36

## 2023-08-12 RX ADMIN — EZETIMIBE 10 MG: 10 TABLET ORAL at 09:36

## 2023-08-12 RX ADMIN — CEFTRIAXONE SODIUM 2000 MG: 2 INJECTION, POWDER, FOR SOLUTION INTRAMUSCULAR; INTRAVENOUS at 20:58

## 2023-08-12 RX ADMIN — CALCIUM 500 MG: 500 TABLET ORAL at 20:51

## 2023-08-12 RX ADMIN — ACETAMINOPHEN 650 MG: 325 TABLET ORAL at 03:43

## 2023-08-12 RX ADMIN — SODIUM CHLORIDE, PRESERVATIVE FREE 10 ML: 5 INJECTION INTRAVENOUS at 11:34

## 2023-08-12 RX ADMIN — ESCITALOPRAM OXALATE 20 MG: 10 TABLET ORAL at 09:36

## 2023-08-12 RX ADMIN — SODIUM CHLORIDE 500 ML: 9 INJECTION, SOLUTION INTRAVENOUS at 09:38

## 2023-08-12 RX ADMIN — ACETAMINOPHEN 650 MG: 325 TABLET ORAL at 20:51

## 2023-08-12 RX ADMIN — ATORVASTATIN CALCIUM 40 MG: 40 TABLET, FILM COATED ORAL at 09:36

## 2023-08-12 RX ADMIN — CALCIUM 500 MG: 500 TABLET ORAL at 09:36

## 2023-08-12 RX ADMIN — SODIUM CHLORIDE, PRESERVATIVE FREE 10 ML: 5 INJECTION INTRAVENOUS at 20:51

## 2023-08-12 ASSESSMENT — PAIN DESCRIPTION - DESCRIPTORS: DESCRIPTORS: ACHING

## 2023-08-12 ASSESSMENT — PAIN DESCRIPTION - LOCATION: LOCATION: ABDOMEN

## 2023-08-12 ASSESSMENT — PAIN DESCRIPTION - ORIENTATION: ORIENTATION: RIGHT

## 2023-08-12 ASSESSMENT — PAIN SCALES - GENERAL: PAINLEVEL_OUTOF10: 3

## 2023-08-12 NOTE — PROGRESS NOTES
V2.0  [facility] Hospitalist Progress Note      Name:  Pat Hamlin /Age/Sex: 1950  (75 y.o. female)   MRN & CSN:  [id number] & [id number] Encounter Date/Time: 2023 11:05 AM EDT    Location:  -A PCP: Cielo Norman MD       Hospital Day: 2    Assessment and Plan:   Pat Hamlin is a 75 y.o. female with pmh of urolithiasis, type II DM, breast cancer, hyperlipidemia, sarcoidosis, astrocytoma and COPD who presents with Ureterolithiasis      Plan:    Sepsis  Criteria met by fevers, tachycardia and UTI  Complicated UTI  Right ureteropelvic junction urolithiasis  CT abdomen pelvis showed large 2 cm hyperdense stone within the right ureteropelvic junction with severe upstream hydronephrosis and perinephric stranding  Underwent IR guided nephrostomy tube placement with return of purulent urine  Blood and urine culture pending  Started on IV ceftriaxone, continue  Urology consulted awaiting further recs. STEFANO  -Likely secondary to above  -Creatinine on presentation 1.6. Baseline under 1  -Creatinine stable, at 1.5 Mg/DL today.  -Plan to give 500 cc bolus today.  -Avoid nephrotoxins  -Monitor electrolytes      Type 2 diabetes mellitus  -Hold home oral antidiabetics  -SSI/Lantus  -Hypoglycemic protocol  -Point-of-care glucose checks     Hyperlipidemia  -Resume statins     Carcinoma of areola of right breast  -Status post [id number]/2022 and radiation txt  -On hormonal therapy  -Follows up with Dr. Anu Patel home inhalers  -Follows up with pulmonology     Oligoastrocytoma  -Status post resection   -Follows up with neurosurgery as outpatient     COPD  -Not in acute exacerbation  -Continue home inhalers     Depression  -Continue Lexapro    Diet ADULT DIET;  Regular   DVT Prophylaxis [] Lovenox, []  Heparin, [] SCDs, [] Ambulation,  [] Eliquis, [] Xarelto  [] Coumadin   Code Status Full Code   Disposition From:   Expected Disposition:   Estimated Date of Discharge:   Patient

## 2023-08-12 NOTE — PROGRESS NOTES
Contacted spouse Renuka Rocha and informed him of transfer to room 2012 on 2 nd floor. He voiced understanding.

## 2023-08-12 NOTE — PROGRESS NOTES
PT neph tube flushed gently with 10 ml of saline PT tolerated well. Urine dark brown cloudy. PT desaturated while sleeping to 88% RA this said nurse applied 2 LPM per NC and oxygen sat elevated PT may need oxygen while sleeping.

## 2023-08-12 NOTE — CONSULTS
pneumothorax or pleural fluid. Heart size is upper limits of normal.  No acute bone finding. Stable chest x-ray. No acute disease. IR GUIDED NEPHROSTOMY CATH PLACEMENT RIGHT    Result Date: 8/11/2023  PROCEDURE: Right PERCUTANEOUS ANTEGRADE PYELOGRAM RIGHT PERCUTANEOUS NEPHROSTOMY TUBE PLACEMENT ULTRASOUND and fluoroscopic GUIDANCE 8/11/2023 HISTORY: ORDERING SYSTEM PROVIDED HISTORY: nephrostomy tube placement TECHNOLOGIST PROVIDED HISTORY: Reason for exam:->nephrostomy tube placement SEDATION: None CONTRAST: 5 cc Isovue FLUOROSCOPY DOSE AND TYPE OR TIME AND EXPOSURES: 139 mGy. 2.5 minutes fluoroscopy time for procedure. TECHNIQUE Maximum sterile barrier technique including hand hygiene, skin prep and sterile ultrasound technique utilized for procedure. . Sterile ultrasound technique also utilized for procedure. Informed consent was obtained following detailed description of the procedure including risks, benefits, and alternatives. Universal protocol was followed. The patient's right flank was prepped and draped in sterile fashion and local anesthesia was achieved with lidocaine. An Accustick needle was advanced into a posterior mid pole calyx using ultrasound guidance and a percutaneous antegrade pyelogram was performed. A 0.035 guidewire was used to place a 8 FR percutaneous nephrostomy tube after the tract was dilated. The catheter was sutured to the skin and the patient tolerated the procedure well. FINDINGS: Percutaneous antegrade pyelogram:  There is hydronephrosis with obstructing stone seen at the ureteropelvic junction. 20 cc frankly purulent urine aspirated and sent for culture. Postprocedure images demonstrate 8 [place] external drainage catheter tip curled within right renal pelvis. No complication suggested. Successful right percutaneous nephrostomy tube placement using ultrasound and fluoroscopic guidance. Frankly purulent urine aspirated and sent for culture.   8 [place] external

## 2023-08-12 NOTE — PLAN OF CARE
Problem: Discharge Planning  Goal: Discharge to home or other facility with appropriate resources  Outcome: Progressing  Flowsheets (Taken 8/12/2023 0800)  Discharge to home or other facility with appropriate resources:   Identify barriers to discharge with patient and caregiver   Arrange for needed discharge resources and transportation as appropriate   Identify discharge learning needs (meds, wound care, etc)     Problem: Safety - Adult  Goal: Free from fall injury  Outcome: Progressing     Problem: Pain  Goal: Verbalizes/displays adequate comfort level or baseline comfort level  Outcome: Progressing  Flowsheets (Taken 8/12/2023 0800)  Verbalizes/displays adequate comfort level or baseline comfort level:   Encourage patient to monitor pain and request assistance   Assess pain using appropriate pain scale   Administer analgesics based on type and severity of pain and evaluate response

## 2023-08-12 NOTE — CODE DOCUMENTATION
Pt is being moved to SD-icu, Xray ordered, EKG done, 1L NS pressure bag 20-30 mins ordered, Pt is anxious. Family was called of the situation.

## 2023-08-12 NOTE — PROGRESS NOTES
Arrived on unit at 0800, Rapid called on pt, this RN entered room received report from off going RN. Pt assessment as charted VS as charted. Neprho tube drainage noted to be bloody in appearance; off going RN stated that its unchanged since arriving from procedure, NP Pat Perry notifed, H/H to be trended. 2200 Report given to [id number] [address] and pt transported by this RN.

## 2023-08-12 NOTE — PROGRESS NOTES
RRT called around 8 pm for fever and tachycardia. Had successful right percutaneous nephrostomy with burton purulent urine aspirated. Advised to give ordered Rocephin, IVF, repeat labs. CXR ordered. Avoid NSAIDs due to STEFANO. Move to stepdown.     Oscar Cedillo MD  Night coverage

## 2023-08-12 NOTE — PROGRESS NOTES
4 Eyes Skin Assessment     NAME:  Winnie Johnson  YOB: 1950  MEDICAL RECORD NUMBER:  [id number]    The patient is being assessed for  Transfer to New Unit    I agree that at least one RN has performed a thorough Head to Toe Skin Assessment on the patient. ALL assessment sites listed below have been assessed. Areas assessed by both nurses:    Head, Face, Ears, Shoulders, Back, Chest, Arms, Elbows, Hands, Sacrum. Buttock, Coccyx, Ischium, Legs. Feet and Heels, and Under Medical Devices         Does the Patient have a Wound?  No noted wound(s)       Luis Prevention initiated by RN: Yes  Wound Care Orders initiated by RN: No    Pressure Injury (Stage 3,4, Unstageable, DTI, NWPT, and Complex wounds) if present, place Wound referral order by RN under : No    New Ostomies, if present place, Ostomy referral order under : No     Nurse 1 eSignature: Electronically signed by Enid Johnson RN on 8/11/23 at 11:09 PM EDT    **SHARE this note so that the co-signing nurse can place an eSignature**    Nurse 2 eSignature: Electronically signed by Mario Donohue RN on 8/12/23 at 12:44 AM EDT

## 2023-08-13 VITALS
RESPIRATION RATE: 22 BRPM | TEMPERATURE: 98 F | HEART RATE: 83 BPM | WEIGHT: 205.03 LBS | DIASTOLIC BLOOD PRESSURE: 70 MMHG | OXYGEN SATURATION: 94 % | SYSTOLIC BLOOD PRESSURE: 99 MMHG | HEIGHT: 65 IN | BODY MASS INDEX: 34.16 KG/M2

## 2023-08-13 LAB
ALBUMIN SERPL-MCNC: 3.5 GM/DL (ref 3.4–5)
ALP BLD-CCNC: 395 IU/L (ref 40–128)
ALT SERPL-CCNC: 190 U/L (ref 10–40)
ANION GAP SERPL CALCULATED.3IONS-SCNC: 11 MMOL/L (ref 4–16)
AST SERPL-CCNC: 166 IU/L (ref 15–37)
BASOPHILS ABSOLUTE: 0 K/CU MM
BASOPHILS RELATIVE PERCENT: 0.3 % (ref 0–1)
BILIRUB SERPL-MCNC: 0.6 MG/DL (ref 0–1)
BUN SERPL-MCNC: 19 MG/DL (ref 6–23)
CALCIUM SERPL-MCNC: 9 MG/DL (ref 8.3–10.6)
CHLORIDE BLD-SCNC: 99 MMOL/L (ref 99–110)
CO2: 25 MMOL/L (ref 21–32)
CREAT SERPL-MCNC: 1 MG/DL (ref 0.6–1.1)
DIFFERENTIAL TYPE: ABNORMAL
EOSINOPHILS ABSOLUTE: 0.2 K/CU MM
EOSINOPHILS RELATIVE PERCENT: 1.6 % (ref 0–3)
GFR SERPL CREATININE-BSD FRML MDRD: 59 ML/MIN/1.73M2
GLUCOSE BLD-MCNC: 89 MG/DL (ref 70–99)
GLUCOSE SERPL-MCNC: 123 MG/DL (ref 70–99)
HCT VFR BLD CALC: 37 % (ref 37–47)
HEMOGLOBIN: 11.7 GM/DL (ref 12.5–16)
IMMATURE NEUTROPHIL %: 0.6 % (ref 0–0.43)
LYMPHOCYTES ABSOLUTE: 0.5 K/CU MM
LYMPHOCYTES RELATIVE PERCENT: 4.2 % (ref 24–44)
MCH RBC QN AUTO: 27.4 PG (ref 27–31)
MCHC RBC AUTO-ENTMCNC: 31.6 % (ref 32–36)
MCV RBC AUTO: 86.7 FL (ref 78–100)
MONOCYTES ABSOLUTE: 0.8 K/CU MM
MONOCYTES RELATIVE PERCENT: 7.4 % (ref 0–4)
NUCLEATED RBC %: 0 %
PDW BLD-RTO: 13.3 % (ref 11.7–14.9)
PLATELET # BLD: 263 K/CU MM (ref 140–440)
PMV BLD AUTO: 10.5 FL (ref 7.5–11.1)
POTASSIUM SERPL-SCNC: 3.9 MMOL/L (ref 3.5–5.1)
RBC # BLD: 4.27 M/CU MM (ref 4.2–5.4)
REASON FOR REJECTION: NORMAL
REJECTED TEST: NORMAL
SEGMENTED NEUTROPHILS ABSOLUTE COUNT: 9.3 K/CU MM
SEGMENTED NEUTROPHILS RELATIVE PERCENT: 85.9 % (ref 36–66)
SODIUM BLD-SCNC: 135 MMOL/L (ref 135–145)
TOTAL IMMATURE NEUTOROPHIL: 0.06 K/CU MM
TOTAL NUCLEATED RBC: 0 K/CU MM
TOTAL PROTEIN: 6.2 GM/DL (ref 6.4–8.2)
WBC # BLD: 10.9 K/CU MM (ref 4–10.5)

## 2023-08-13 PROCEDURE — 36415 COLL VENOUS BLD VENIPUNCTURE: CPT

## 2023-08-13 PROCEDURE — 6360000002 HC RX W HCPCS: Performed by: STUDENT IN AN ORGANIZED HEALTH CARE EDUCATION/TRAINING PROGRAM

## 2023-08-13 PROCEDURE — 6370000000 HC RX 637 (ALT 250 FOR IP): Performed by: STUDENT IN AN ORGANIZED HEALTH CARE EDUCATION/TRAINING PROGRAM

## 2023-08-13 PROCEDURE — 85025 COMPLETE CBC W/AUTO DIFF WBC: CPT

## 2023-08-13 PROCEDURE — 82962 GLUCOSE BLOOD TEST: CPT

## 2023-08-13 PROCEDURE — 2580000003 HC RX 258: Performed by: STUDENT IN AN ORGANIZED HEALTH CARE EDUCATION/TRAINING PROGRAM

## 2023-08-13 PROCEDURE — 80053 COMPREHEN METABOLIC PANEL: CPT

## 2023-08-13 PROCEDURE — 2700000000 HC OXYGEN THERAPY PER DAY

## 2023-08-13 PROCEDURE — 94761 N-INVAS EAR/PLS OXIMETRY MLT: CPT

## 2023-08-13 RX ORDER — AMOXICILLIN 500 MG/1
500 CAPSULE ORAL 3 TIMES DAILY
Qty: 36 CAPSULE | Refills: 0 | Status: SHIPPED | OUTPATIENT
Start: 2023-08-13 | End: 2023-08-25

## 2023-08-13 RX ADMIN — LETROZOLE 2.5 MG: 2.5 TABLET ORAL at 08:10

## 2023-08-13 RX ADMIN — EZETIMIBE 10 MG: 10 TABLET ORAL at 08:10

## 2023-08-13 RX ADMIN — ATORVASTATIN CALCIUM 40 MG: 40 TABLET, FILM COATED ORAL at 08:10

## 2023-08-13 RX ADMIN — ENOXAPARIN SODIUM 40 MG: 100 INJECTION SUBCUTANEOUS at 08:11

## 2023-08-13 RX ADMIN — SODIUM CHLORIDE, PRESERVATIVE FREE 10 ML: 5 INJECTION INTRAVENOUS at 08:11

## 2023-08-13 RX ADMIN — CALCIUM 500 MG: 500 TABLET ORAL at 08:11

## 2023-08-13 RX ADMIN — ESCITALOPRAM OXALATE 20 MG: 10 TABLET ORAL at 08:10

## 2023-08-13 NOTE — DISCHARGE SUMMARY
breast  -Status post [id number]/2022 and radiation txt  -On hormonal therapy  -Follows up with Dr. Jossie Kahn home inhalers  -Follows up with pulmonology     Oligoastrocytoma  -Status post resection 4/13  -Follows up with neurosurgery as outpatient     COPD  -Not in acute exacerbation  -Continue home inhalers     Depression  -Continue Lexapro    The patient expressed appropriate understanding of and agreement with the discharge recommendations, medications, and plan.      Consults this admission:  IP CONSULT  [facility] NEEDS      Discharge Instruction:   Handoff to PCP:     Follow up appointments: pcp, urology   Primary care physician:     Diet:  regular diet   Activity: activity as tolerated  Disposition: Discharged to:   [x]Home, [][facility], []SNF, []Acute Rehab, []Hospice   Condition on discharge: Stable    Discharge Medications:        Medication List        START taking these medications      amoxicillin 500 MG capsule  Commonly known as: AMOXIL  Take 1 capsule by mouth 3 times daily for 12 days            CONTINUE taking these medications      atorvastatin 40 MG tablet  Commonly known as: LIPITOR  TAKE 1 TABLET BY MOUTH DAILY     CALCIUM 600 PO     escitalopram 20 MG tablet  Commonly known as: LEXAPRO     ezetimibe 10 MG tablet  Commonly known as: ZETIA  Take 1 tablet by mouth daily     Lancets Ultra Thin 54A Misc  1 applicator by Does not apply route 2 times daily     letrozole 2.5 MG tablet  Commonly known as: Femara  Take 1 tablet by mouth daily     metFORMIN 500 MG extended release tablet  Commonly known as: GLUCOPHAGE-XR  Take 1 tablet by mouth daily (with breakfast)     MULTIVITAMIN ADULT PO     True Metrix Blood Glucose Test strip  Generic drug: blood glucose test strips  Testing once daily, DX=E11.9     umeclidinium-vilanterol 62.5-25 MCG/ACT inhaler  Commonly known as: ANORO ELLIPTA  Inhale 1 puff into the lungs daily     vitamin D3 25 MCG (1000 UT) Tabs

## 2023-08-13 NOTE — DISCHARGE INSTR - ACTIVITY
Keep nephrostomy tube dressing clean & dry. Sponge bath or wrap area with saran wrap before showering. Do not submerge in a tub, swimming pool, hot tub or any standing water. Watch for signs and symptoms of infection.

## 2023-08-13 NOTE — PROGRESS NOTES
Discharge instructions provided to patient and family. Discussed new medication administration and follow up appointments. Reviewed with patient signs and symptoms of infection with the nephrostomy tube. Educated patient and family regarding emptying the nephrostomy tube and how to keep the site clean and reduce risk of infection. Additional dressings and supplies for bathing provided to patient. Patient and family verbalized understanding of all discharge teachings and instructions. IV and telemetry removed. Patient dressed and wheeled to lobby by nursing assistant and home with family.

## 2023-08-14 ENCOUNTER — TELEPHONE (OUTPATIENT)
Dept: INTERNAL MEDICINE CLINIC | Age: 73
End: 2023-08-14

## 2023-08-14 DIAGNOSIS — N21.8 CALCULUS OF OTHER LOWER URINARY TRACT LOCATION: Primary | ICD-10-CM

## 2023-08-14 NOTE — TELEPHONE ENCOUNTER
Needs to see a urologist; [place] Kendal [place] [id number] [address] now; referral placed; pt can likely call and schedule with them as

## 2023-08-14 NOTE — TELEPHONE ENCOUNTER
Confirmed that patient was referred to an incorrect provider. Patient would like a referral placed to Dr. Brenda Chaparro if possible.

## 2023-08-14 NOTE — TELEPHONE ENCOUNTER
Patient contacted office and reports when she was discharged from the hospital her discharge paperwork tells her to follow up with a Dr. Ciro Kumari to have her drains removed, but the number on the paperwork is incorrect and she is not sure where she should go. Informed patient we would check for her and call her back.

## 2023-08-15 LAB
EKG ATRIAL RATE: 141 BPM
EKG DIAGNOSIS: NORMAL
EKG P AXIS: 50 DEGREES
EKG P-R INTERVAL: 144 MS
EKG Q-T INTERVAL: 296 MS
EKG QRS DURATION: 78 MS
EKG QTC CALCULATION (BAZETT): 453 MS
EKG R AXIS: -67 DEGREES
EKG T AXIS: -20 DEGREES
EKG VENTRICULAR RATE: 141 BPM

## 2023-08-16 LAB
CULTURE: NORMAL
CULTURE: NORMAL
Lab: NORMAL
Lab: NORMAL
SPECIMEN: NORMAL
SPECIMEN: NORMAL

## 2023-08-30 ENCOUNTER — COMMUNITY OUTREACH (OUTPATIENT)
Dept: INTERNAL MEDICINE CLINIC | Age: 73
End: 2023-08-30

## 2023-09-11 NOTE — H&P
[address] [facility] [place] [address], [address]   2070 [facility] 1 2 3 4 5      Urology History and Physical  September 11, 2023    PROCEDURE: RIGHT CYSTOSCOPY URETERSOCOPY  RETROGRADE PYELOGRAM STONE MANIPULATION HOLIUM LASER LITHOTRIPSY POSS STENT PLACEMENT    HPI: Davidson Rodriguez is a pleasant 75y.o. year old female with PMH DM, brain tumor s/p resection with right urolithiasis. She was found to have a right 2cm renal pelvis stone and additional 8mm right sided stone. She had a PCN placed due to infection. Plan now for URS. May need to stage. Anticoagulation: None  Urine culture: Neg, plan periop CTX    Past Medical History:  Past Medical History:   Diagnosis Date    Abnormal mammogram 08/14/2017    0.4 cm hypoechoic mass(most likely a small intramammary lymph node or less likely a complicated cyst.  Follow-up in 6 months recommended with ultrasound    Aromatase inhibitor use 7/24/2023    Attempted suicide ([address]) 2012    hanged herself. Brain neoplasm ([address])     grade II Oligo resected 4/13, Dr. Allan Kellogg resected tumor. Dr. Dafne Sood oncologist seeing pt also. No chemo. Dr. Allan Kellogg following pt q year. Tumor recurred, right frontal craniotomy and tumor resection 11/14 Dr. Princess Castillo. Had radiation to the brain     Chronic obstructive pulmonary disease ([address]) 06/15/2016    PFT showed mod sees Dr Otto Calle 07/08/2022    positive home test    Cyst of left kidney 09/11/2014    MR I of 7/14. follow-up in 6-12 months    Cyst of pancreas 09/11/2014    Needs follow-up in one year 7/15    Depression     Diabetes mellitus ([address])     Family history of breast cancer in first degree relative 10/26/2022    Hearing loss     Hearing loss     Herniated disc     L4 &L5    History of external beam radiation therapy 2015 [address]    Hyperlipidemia     Kidney stones     Liver dysfunction 06/06/2014    In 7/14 hepatitis ABC negative. Immune studies negative, iron studies normal.  MRI of liver mild fatty liver.  F/u mouth daily, Disp: 30 tablet, Rfl: 5    escitalopram (LEXAPRO) 20 MG tablet, Take 1 tablet by mouth daily, Disp: , Rfl:     Multiple Vitamin (MULTIVITAMIN ADULT PO), Take 1 tablet by mouth daily (with breakfast), Disp: , Rfl:     Calcium Carbonate (CALCIUM 600 PO), Take 600 mg by mouth 2 times daily, Disp: , Rfl:     vitamin D3 (CHOLECALCIFEROL) 25 MCG (1000 UT) TABS tablet, Take 2 tablets by mouth daily, Disp: 30 tablet, Rfl: 5    blood glucose test strips (TRUE METRIX BLOOD GLUCOSE TEST) strip, Testing once daily, DX=E11.9, Disp: 50 strip, Rfl: 5    Lancets Ultra Thin 94W MISC, 1 applicator by Does not apply route 2 times daily, Disp: 100 each, Rfl: 5    Allergies:   Allergies   Allergen Reactions    Minocycline     Tetracyclines & Related     Demeclocycline Rash    Tetracycline Rash       Social History:  Social History     Socioeconomic History    Marital status:      Spouse name: Not on file    Number of children: Not on file    Years of education: Not on file    Highest education level: Not on file   Occupational History    Not on file   Tobacco Use    Smoking status: Former     Packs/day: 0.75     Years: 20.00     Additional pack years: 0.00     Total pack years: 15.00     Types: Cigarettes     Quit date: 2003     Years since quittin.0    Smokeless tobacco: Never   Vaping Use    Vaping Use: Never used   Substance and Sexual Activity    Alcohol use: No     Alcohol/week: 0.0 standard drinks of alcohol    Drug use: No    Sexual activity: Yes     Partners: Male   Other Topics Concern    Not on file   Social History Narrative    Not on file     Social Determinants of Health     Financial Resource Strain: Low Risk  (2023)    Overall Financial Resource Strain (CARDIA)     Difficulty of Paying Living Expenses: Not hard at all   Food Insecurity: No Food Insecurity (2023)    Hunger Vital Sign     Worried About Running Out of Food in the Last Year: Never true     [address] in the Last Year:

## 2023-09-12 ENCOUNTER — ANESTHESIA EVENT (OUTPATIENT)
Dept: OPERATING ROOM | Age: 73
End: 2023-09-12
Payer: MEDICARE

## 2023-09-13 ENCOUNTER — HOSPITAL ENCOUNTER (OUTPATIENT)
Age: 73
Setting detail: OUTPATIENT SURGERY
Discharge: HOME | End: 2023-09-13
Attending: UROLOGY | Admitting: UROLOGY
Payer: MEDICARE

## 2023-09-13 ENCOUNTER — APPOINTMENT (OUTPATIENT)
Dept: GENERAL RADIOLOGY | Age: 73
End: 2023-09-13
Attending: UROLOGY
Payer: MEDICARE

## 2023-09-13 ENCOUNTER — ANESTHESIA (OUTPATIENT)
Dept: OPERATING ROOM | Age: 73
End: 2023-09-13
Payer: MEDICARE

## 2023-09-13 VITALS
HEART RATE: 74 BPM | DIASTOLIC BLOOD PRESSURE: 54 MMHG | SYSTOLIC BLOOD PRESSURE: 136 MMHG | RESPIRATION RATE: 18 BRPM | WEIGHT: 207 LBS | HEIGHT: 65 IN | TEMPERATURE: 96.8 F | OXYGEN SATURATION: 96 % | BODY MASS INDEX: 34.49 KG/M2

## 2023-09-13 DIAGNOSIS — N20.0 CALCULUS OF KIDNEY: ICD-10-CM

## 2023-09-13 LAB
GLUCOSE BLD-MCNC: 150 MG/DL (ref 70–99)
GLUCOSE BLD-MCNC: 161 MG/DL (ref 70–99)

## 2023-09-13 PROCEDURE — C2617 STENT, NON-COR, TEM W/O DEL: HCPCS | Performed by: UROLOGY

## 2023-09-13 PROCEDURE — 7100000000 HC PACU RECOVERY - FIRST 15 MIN: Performed by: UROLOGY

## 2023-09-13 PROCEDURE — 3700000000 HC ANESTHESIA ATTENDED CARE: Performed by: UROLOGY

## 2023-09-13 PROCEDURE — 7100000011 HC PHASE II RECOVERY - ADDTL 15 MIN: Performed by: UROLOGY

## 2023-09-13 PROCEDURE — 76000 FLUOROSCOPY <1 HR PHYS/QHP: CPT

## 2023-09-13 PROCEDURE — 6360000002 HC RX W HCPCS: Performed by: ANESTHESIOLOGY

## 2023-09-13 PROCEDURE — 3700000001 HC ADD 15 MINUTES (ANESTHESIA): Performed by: UROLOGY

## 2023-09-13 PROCEDURE — C1769 GUIDE WIRE: HCPCS | Performed by: UROLOGY

## 2023-09-13 PROCEDURE — 6360000002 HC RX W HCPCS: Performed by: NURSE ANESTHETIST, CERTIFIED REGISTERED

## 2023-09-13 PROCEDURE — 82962 GLUCOSE BLOOD TEST: CPT

## 2023-09-13 PROCEDURE — C1758 CATHETER, URETERAL: HCPCS | Performed by: UROLOGY

## 2023-09-13 PROCEDURE — 3600000004 HC SURGERY LEVEL 4 BASE: Performed by: UROLOGY

## 2023-09-13 PROCEDURE — 82360 CALCULUS ASSAY QUANT: CPT

## 2023-09-13 PROCEDURE — 3600000014 HC SURGERY LEVEL 4 ADDTL 15MIN: Performed by: UROLOGY

## 2023-09-13 PROCEDURE — C1894 INTRO/SHEATH, NON-LASER: HCPCS | Performed by: UROLOGY

## 2023-09-13 PROCEDURE — 88300 SURGICAL PATH GROSS: CPT

## 2023-09-13 PROCEDURE — 7100000001 HC PACU RECOVERY - ADDTL 15 MIN: Performed by: UROLOGY

## 2023-09-13 PROCEDURE — 2500000003 HC RX 250 WO HCPCS: Performed by: NURSE ANESTHETIST, CERTIFIED REGISTERED

## 2023-09-13 PROCEDURE — 2720000010 HC SURG SUPPLY STERILE: Performed by: UROLOGY

## 2023-09-13 PROCEDURE — 2580000003 HC RX 258: Performed by: ANESTHESIOLOGY

## 2023-09-13 PROCEDURE — 7100000010 HC PHASE II RECOVERY - FIRST 15 MIN: Performed by: UROLOGY

## 2023-09-13 PROCEDURE — 2709999900 HC NON-CHARGEABLE SUPPLY: Performed by: UROLOGY

## 2023-09-13 DEVICE — VARIABLE LENGTH URETERAL STENT
Type: IMPLANTABLE DEVICE | Site: URETER | Status: FUNCTIONAL
Brand: CONTOUR VL™

## 2023-09-13 RX ORDER — LIDOCAINE HYDROCHLORIDE 20 MG/ML
INJECTION, SOLUTION EPIDURAL; INFILTRATION; INTRACAUDAL; PERINEURAL PRN
Status: DISCONTINUED | OUTPATIENT
Start: 2023-09-13 | End: 2023-09-13 | Stop reason: SDUPTHER

## 2023-09-13 RX ORDER — SODIUM CHLORIDE, SODIUM LACTATE, POTASSIUM CHLORIDE, CALCIUM CHLORIDE 600; 310; 30; 20 MG/100ML; MG/100ML; MG/100ML; MG/100ML
INJECTION, SOLUTION INTRAVENOUS CONTINUOUS
Status: DISCONTINUED | OUTPATIENT
Start: 2023-09-13 | End: 2023-09-13 | Stop reason: HOSPADM

## 2023-09-13 RX ORDER — TAMSULOSIN HYDROCHLORIDE 0.4 MG/1
0.4 CAPSULE ORAL NIGHTLY
Qty: 30 CAPSULE | Refills: 0 | Status: SHIPPED | OUTPATIENT
Start: 2023-09-13

## 2023-09-13 RX ORDER — PROPOFOL 10 MG/ML
INJECTION, EMULSION INTRAVENOUS PRN
Status: DISCONTINUED | OUTPATIENT
Start: 2023-09-13 | End: 2023-09-13 | Stop reason: SDUPTHER

## 2023-09-13 RX ORDER — CELECOXIB 200 MG/1
200 CAPSULE ORAL DAILY
Qty: 30 CAPSULE | Refills: 0 | Status: SHIPPED | OUTPATIENT
Start: 2023-09-13

## 2023-09-13 RX ORDER — LABETALOL HYDROCHLORIDE 5 MG/ML
10 INJECTION, SOLUTION INTRAVENOUS
Status: DISCONTINUED | OUTPATIENT
Start: 2023-09-13 | End: 2023-09-13 | Stop reason: HOSPADM

## 2023-09-13 RX ORDER — ONDANSETRON 2 MG/ML
INJECTION INTRAMUSCULAR; INTRAVENOUS PRN
Status: DISCONTINUED | OUTPATIENT
Start: 2023-09-13 | End: 2023-09-13 | Stop reason: SDUPTHER

## 2023-09-13 RX ORDER — OXYCODONE HYDROCHLORIDE 5 MG/1
5 TABLET ORAL
Status: DISCONTINUED | OUTPATIENT
Start: 2023-09-13 | End: 2023-09-13 | Stop reason: HOSPADM

## 2023-09-13 RX ORDER — HYDRALAZINE HYDROCHLORIDE 20 MG/ML
10 INJECTION INTRAMUSCULAR; INTRAVENOUS
Status: DISCONTINUED | OUTPATIENT
Start: 2023-09-13 | End: 2023-09-13 | Stop reason: HOSPADM

## 2023-09-13 RX ORDER — SULFAMETHOXAZOLE AND TRIMETHOPRIM 800; 160 MG/1; MG/1
1 TABLET ORAL 2 TIMES DAILY
Qty: 6 TABLET | Refills: 0 | Status: SHIPPED | OUTPATIENT
Start: 2023-09-13 | End: 2023-09-16

## 2023-09-13 RX ORDER — DIPHENHYDRAMINE HYDROCHLORIDE 50 MG/ML
12.5 INJECTION INTRAMUSCULAR; INTRAVENOUS
Status: DISCONTINUED | OUTPATIENT
Start: 2023-09-13 | End: 2023-09-13 | Stop reason: HOSPADM

## 2023-09-13 RX ORDER — LABETALOL HYDROCHLORIDE 5 MG/ML
INJECTION, SOLUTION INTRAVENOUS PRN
Status: DISCONTINUED | OUTPATIENT
Start: 2023-09-13 | End: 2023-09-13 | Stop reason: SDUPTHER

## 2023-09-13 RX ORDER — FENTANYL CITRATE 50 UG/ML
25 INJECTION, SOLUTION INTRAMUSCULAR; INTRAVENOUS EVERY 5 MIN PRN
Status: DISCONTINUED | OUTPATIENT
Start: 2023-09-13 | End: 2023-09-13 | Stop reason: HOSPADM

## 2023-09-13 RX ORDER — FENTANYL CITRATE 50 UG/ML
INJECTION, SOLUTION INTRAMUSCULAR; INTRAVENOUS PRN
Status: DISCONTINUED | OUTPATIENT
Start: 2023-09-13 | End: 2023-09-13 | Stop reason: SDUPTHER

## 2023-09-13 RX ORDER — OXYBUTYNIN CHLORIDE 5 MG/1
5 TABLET ORAL EVERY 8 HOURS PRN
Qty: 30 TABLET | Refills: 0 | Status: SHIPPED | OUTPATIENT
Start: 2023-09-13

## 2023-09-13 RX ORDER — SODIUM CHLORIDE 0.9 % (FLUSH) 0.9 %
5-40 SYRINGE (ML) INJECTION PRN
Status: DISCONTINUED | OUTPATIENT
Start: 2023-09-13 | End: 2023-09-13 | Stop reason: HOSPADM

## 2023-09-13 RX ORDER — DROPERIDOL 2.5 MG/ML
0.62 INJECTION, SOLUTION INTRAMUSCULAR; INTRAVENOUS EVERY 10 MIN PRN
Status: DISCONTINUED | OUTPATIENT
Start: 2023-09-13 | End: 2023-09-13 | Stop reason: HOSPADM

## 2023-09-13 RX ORDER — ONDANSETRON 2 MG/ML
4 INJECTION INTRAMUSCULAR; INTRAVENOUS
Status: DISCONTINUED | OUTPATIENT
Start: 2023-09-13 | End: 2023-09-13 | Stop reason: HOSPADM

## 2023-09-13 RX ORDER — DEXAMETHASONE SODIUM PHOSPHATE 4 MG/ML
INJECTION, SOLUTION INTRA-ARTICULAR; INTRALESIONAL; INTRAMUSCULAR; INTRAVENOUS; SOFT TISSUE PRN
Status: DISCONTINUED | OUTPATIENT
Start: 2023-09-13 | End: 2023-09-13 | Stop reason: SDUPTHER

## 2023-09-13 RX ORDER — SODIUM CHLORIDE 0.9 % (FLUSH) 0.9 %
5-40 SYRINGE (ML) INJECTION EVERY 12 HOURS SCHEDULED
Status: DISCONTINUED | OUTPATIENT
Start: 2023-09-13 | End: 2023-09-13 | Stop reason: HOSPADM

## 2023-09-13 RX ADMIN — CEFAZOLIN 2000 MG: 2 INJECTION, POWDER, FOR SOLUTION INTRAMUSCULAR; INTRAVENOUS at 11:55

## 2023-09-13 RX ADMIN — SODIUM CHLORIDE, POTASSIUM CHLORIDE, SODIUM LACTATE AND CALCIUM CHLORIDE: 600; 310; 30; 20 INJECTION, SOLUTION INTRAVENOUS at 11:42

## 2023-09-13 RX ADMIN — FENTANYL CITRATE 100 MCG: 50 INJECTION, SOLUTION INTRAMUSCULAR; INTRAVENOUS at 11:48

## 2023-09-13 RX ADMIN — LIDOCAINE HYDROCHLORIDE 100 MG: 20 INJECTION, SOLUTION EPIDURAL; INFILTRATION; INTRACAUDAL; PERINEURAL at 11:48

## 2023-09-13 RX ADMIN — ONDANSETRON 4 MG: 2 INJECTION INTRAMUSCULAR; INTRAVENOUS at 11:48

## 2023-09-13 RX ADMIN — LABETALOL HYDROCHLORIDE 5 MG: 5 INJECTION, SOLUTION INTRAVENOUS at 13:00

## 2023-09-13 RX ADMIN — PROPOFOL 200 MG: 10 INJECTION, EMULSION INTRAVENOUS at 11:48

## 2023-09-13 RX ADMIN — DEXAMETHASONE SODIUM PHOSPHATE 4 MG: 4 INJECTION, SOLUTION INTRAMUSCULAR; INTRAVENOUS at 11:48

## 2023-09-13 ASSESSMENT — PAIN DESCRIPTION - ONSET: ONSET: PROGRESSIVE

## 2023-09-13 ASSESSMENT — PAIN DESCRIPTION - ORIENTATION: ORIENTATION: LOWER

## 2023-09-13 ASSESSMENT — PAIN SCALES - GENERAL
PAINLEVEL_OUTOF10: 0
PAINLEVEL_OUTOF10: 0
PAINLEVEL_OUTOF10: 3

## 2023-09-13 ASSESSMENT — PAIN DESCRIPTION - PAIN TYPE: TYPE: SURGICAL PAIN;OTHER (COMMENT)

## 2023-09-13 ASSESSMENT — PAIN DESCRIPTION - LOCATION: LOCATION: ABDOMEN

## 2023-09-13 ASSESSMENT — PAIN - FUNCTIONAL ASSESSMENT
PAIN_FUNCTIONAL_ASSESSMENT: 0-10
PAIN_FUNCTIONAL_ASSESSMENT: PREVENTS OR INTERFERES SOME ACTIVE ACTIVITIES AND ADLS

## 2023-09-13 ASSESSMENT — PAIN DESCRIPTION - DESCRIPTORS: DESCRIPTORS: CRAMPING

## 2023-09-13 ASSESSMENT — PAIN DESCRIPTION - FREQUENCY: FREQUENCY: CONTINUOUS

## 2023-09-13 NOTE — PROGRESS NOTES
1358 Pt. Brought back to unit, bedside report received from Angelique Burr. Pt. Is A&O, VSS, pt provided with beverage. 1400 Pt. Up and ambulated to bathroom, tolerated well. 1425 Spouse at bedside. Pt. Denies further needs at this time. 1438 PS sent to Dr. Conti Record regarding switch of pharmacy for outdoor pain meds. [address] instructions reviewed with pt and family, all parties express understanding. 1450 Pt. Getting dressed. 1458 Pt. To be wheeled down in [facility].

## 2023-09-13 NOTE — PROGRESS NOTES
1307- pt. Arrived to pacu via cart from OR. Pt. Attached to monitor and alarms are on. Pt. Is drowsy from anesthesia but responds to verbal stimuli and able to follow commands. Pt. BP elevated. CRNA Rony ROSARIO administered 5mg of labetalol. Pt. Denies pain or n/v. Pt. States feels like she needs to urinate and have a BM. Pt. Right flank has old bloody dressing from previous nephrostomy tube. Dressing removed. Site cleaned and gauze and tegaderm placed. Pt. Leslie Mask on bedpan. 46- Dr. Moore Model notified of pt. Elevated BP. /67. Informed pt. Having discomfort and and states needs to have a BM. No new orders at this time. Continue to monitor BP.   1350- pt. Is aox4. She denies pain or n/v. Pt. Has attempted bed pan x2. Pt. Was able to urinate 300cc of pink tinged urine. Pt. Unable to have a BM. Pt. Updated on plan of care. She is ready to transfer to [facility] for continuation of care.    1358- gave bedside report to Radha Madera

## 2023-09-13 NOTE — DISCHARGE INSTRUCTIONS
Discharge Instructions for Ureteral Stent Placement or Ureteroscopy    What is a stent? A stent is a long, thin plastic tube that runs from the kidney to the bladder. It allows the kidney to drain and protects the kidney from blockage, swelling, and infection. What are the expected findings with the stents in place? While the stent is in place, you may have back or side pain, bladder pressure or pain, frequent urination, urgency to get to the bathroom and blood or blood clots in your urine. These symptoms may worsen during urination. You may also have blood in your urine, small blood clots, or debris. This is common after this procedure and not a cause for concern. These symptoms typically improve after the first 48 hours but may persist the entire time your stent is in place. Many patients do not notice their stent at all, and that is okay. Do I have any dietary restrictions? There are no specific dietary restrictions. We recommend that you increase your fluid intake for the next few days (preferably water). If you were previously on a fluid restriction, please follow those recommendations. Do I have any activity restrictions? You should take it easy for 24 hours immediately after the procedure and increase your activity as tolerated. You may shower or bathe normally after your procedure. Medication Management:    You can obtain good pain relief by taking acetaminophen (Tylenol) every 4-6 hours as needed. Do not exceed 4000 mg acetaminophen per day. If you are able to take ibuprofen (Ex. Motrin, Advil) you may also take this. We recommend taking 600 mg of ibuprofen every 6 hours as needed. You may take acetaminophen and ibuprofen together - these medications do not interact and are safe to take at the same time, or alternating every 3 hours. You may be given a prescription for Oxybutynin or Tamsulosin.   These medications help relax the urinary system and ease the discomfort professional medical care. Always follow your healthcare professional's instructions. [facility]  [phone]    Do not drive, work around [employer] [employer] or use equipment. Do not drink any alcoholic beverages. Do not smoke while alone. Avoid making important decisions. Plan to spend a quiet, relaxed evening @ home. Resume normal activities as you begin to feel better. Eat lightly for your first meal, then gradually increase your diet to what is normal for you. In case of nausea, avoid food and drink only clear liquids. Resume food as nausea ceases. Notify your surgeon if you experience fever, chills, large amount of bleeding, difficulty breathing, persistent nausea and vomiting or any other disturbing problem. Call for a follow-up appointment with your surgeon.

## 2023-09-13 NOTE — OP NOTE
UROLOGY OPERATIVE NOTE  DATE: 9/13/2023    PATIENT: Nash Mirza  AGE: 75 y.o. SEX: female  MRN: [id number]  YOB: 1950    PROCEDURE:  1. Cystoscopy, rigid (73948)  2. Right retrograde pyelogram (07582)  3. Right ureteroscopy with laser lithotripsy with stone manipulation (44970)  4. Stone basketing (23377)  5. Right ureteral stent placement (Stent size: 4.8Fr Variable) (05805)  6. Removal of right nephrostomy tube under fluoroscopic guidance    SURGEON: Regan Choudhury MD    PREOPERATIVE DIAGNOSIS: Right nephrolithiasis    POSTOPERATIVE DIAGNOSIS: Same    FINDINGS: Normal urethra, no bladder masses, massive stone fragmented and dusted, stent placed with curl in kidney and bladder, right nephrostomy tube removed under fluoroscopic guidance in entirety    ANESTHESIA: General    BLOOD LOSS: Minimal    COMPLICATIONS: None    DRAINS: Right 4.8Fr Variable JJ stent    IMPLANTS:  Implant Name Type Inv. Item Serial No.  Lot No. LRB No. Used Action   STENT URET 4.8FR [id number] PERCFLX HYDR+ SFT PGTL TAPR TIP - [id number]  STENT URET 4.8FR [id number] PERCFLX HYDR+ SFT PGTL TAPR TIP  [employer] [facility] UROLOGY- [id number] Right 1 Implanted        SPECIMENS:   ID Type Source Tests Collected by Time Destination   1 : RIGHT URETERAL STONE  Stone (Calculus) Tissue STONE ANALYSIS Regan Choudhury MD 9/13/2023 1236        INDICATIONS FOR OPERATIVE PROCEDURE: Nash Mirza is a 75y.o. year old female with PMH DM, brain tumor s/p resection with right urolithiasis. She previously had a right nephrostomy tube place. Patient was NOT pre-stented. I recommended proceeding with ureteroscopy with laser lithotripsy. She understood that it may be staged due to stone burden. The risks discussed included, but were not limited to, urinary tract infection, sepsis, bleeding, injury to urethra/bladder/ureter, urethra stricture, ureteral stricture, inability to treat stone.  Patient agreeable to proceed and informed consent

## 2023-09-16 LAB
APPEARANCE STONE: NORMAL
COMPN STONE: NORMAL
SPECIMEN WT: 18 MG

## 2023-09-19 ENCOUNTER — HOSPITAL ENCOUNTER (OUTPATIENT)
Dept: ULTRASOUND IMAGING | Age: 73
End: 2023-09-19
Payer: MEDICARE

## 2023-09-19 ENCOUNTER — HOSPITAL ENCOUNTER (OUTPATIENT)
Dept: WOMENS IMAGING | Age: 73
Discharge: HOME | End: 2023-09-19
Payer: MEDICARE

## 2023-09-19 DIAGNOSIS — C50.911 MUCINOUS CARCINOMA OF BREAST, RIGHT (HCC): ICD-10-CM

## 2023-09-19 DIAGNOSIS — Z85.3 HISTORY OF RIGHT BREAST CANCER: ICD-10-CM

## 2023-09-19 PROCEDURE — G0279 TOMOSYNTHESIS, MAMMO: HCPCS

## 2023-09-24 ASSESSMENT — ENCOUNTER SYMPTOMS
SHORTNESS OF BREATH: 1
NAUSEA: 0
SORE THROAT: 0
VOMITING: 0
EYE DISCHARGE: 0
CONSTIPATION: 0
EYE REDNESS: 0
DIARRHEA: 0
ABDOMINAL PAIN: 0
CHEST TIGHTNESS: 0
COLOR CHANGE: 0
ABDOMINAL DISTENTION: 0

## 2023-09-25 ENCOUNTER — OFFICE VISIT (OUTPATIENT)
Dept: SURGERY | Age: 73
End: 2023-09-25
Payer: MEDICARE

## 2023-09-25 VITALS — BODY MASS INDEX: 34.49 KG/M2 | WEIGHT: 207 LBS | HEIGHT: 65 IN | HEART RATE: 110 BPM | OXYGEN SATURATION: 99 %

## 2023-09-25 DIAGNOSIS — Z78.0 POST-MENOPAUSAL: Primary | ICD-10-CM

## 2023-09-25 PROCEDURE — 1123F ACP DISCUSS/DSCN MKR DOCD: CPT | Performed by: SURGERY

## 2023-09-25 PROCEDURE — 99213 OFFICE O/P EST LOW 20 MIN: CPT | Performed by: SURGERY

## 2023-09-25 ASSESSMENT — PATIENT HEALTH QUESTIONNAIRE - PHQ9
SUM OF ALL RESPONSES TO PHQ QUESTIONS 1-9: 0
SUM OF ALL RESPONSES TO PHQ9 QUESTIONS 1 & 2: 0
SUM OF ALL RESPONSES TO PHQ QUESTIONS 1-9: 0
1. LITTLE INTEREST OR PLEASURE IN DOING THINGS: 0
SUM OF ALL RESPONSES TO PHQ QUESTIONS 1-9: 0
2. FEELING DOWN, DEPRESSED OR HOPELESS: 0
SUM OF ALL RESPONSES TO PHQ QUESTIONS 1-9: 0

## 2023-09-25 NOTE — PROGRESS NOTES
GENERAL SURGERY NOTE - Outpatient Progress  [facility]) Physicians    PATIENT: Evelyn Paz 1950, 75 y.o., female   Date: 9/25/2023  MRN: [id number]   Requesting Provider:   No ref. provider found History Obtained From:  patient, electronic medical record     Reason for Evaluation & Chief Complaint:    Chief Complaint   Patient presents with    Follow-up     FU S/P RT Breast lump W/Partial mast W/Wire Loc and SNL BX 12/15/22       HISTORY OF PRESENT ILLNESS:      Evelyn Paz is a 75 y.o. female presenting in follow up after RIGHT BREAST LUMPECTOMY PARTIAL MASTECTOMY WITH WIRE LOCALIZATION, SENTINEL LYMPH NODE BIOPSY 12/15/22. Since last seen, she has been doing very well. She is taking Femara, has noticed more sweating since she's on it, but denies other symptoms or side effects.      Breast Concerns: no  Nipple Drainage or Retraction: no  Skin Changes: no  Headaches: no  Vision Changes: no  Weight Change: no  Shortness of Breath or Chest Pain: no  Bone Pain: yes: arthritis in her foot  Lymphedema: no    Workup Includes:   Mammogram/US:   9/19/23 mmg - UTNBJM5 - expected postop changes  DEXA: 11/2/22 osteopenia  Sozo: prior WNL (unable to assess today in [facility])    Breast History, General  Endocrine Therapy: on letrozole/Femara, started 4/2023  Oncotype/Genetic Testing: negative  Chemotherapy: none for breast cancer, \"chemo pill to enhance radiation\" for her brain tumor  Estrogen/HRT: had a hysterectomy and ovaries removed, then took estrogen for ~10 years  Radiation/Environmental Exposure (eg mantle radiation): Hx of brain tumor \"oligo\", s/p surgery and RT (not to chest)  Age of menarche: 14  Age at birth of first child: 23  Menopausal status: Post-menopausal  Family History of breast cancer: yes:   FH of multiple cancers, sister w/ breast cancer at 45 (was on immunosuppression for kidney transplant), maternal aunt with breast cancer in her 80's     Right Breast History  Surgery, Date:   right

## 2023-09-27 ENCOUNTER — HOSPITAL ENCOUNTER (OUTPATIENT)
Dept: CT IMAGING | Age: 73
Discharge: HOME | End: 2023-09-27
Attending: UROLOGY
Payer: MEDICARE

## 2023-09-27 DIAGNOSIS — N20.0 CALCULUS OF KIDNEY: ICD-10-CM

## 2023-09-27 PROCEDURE — 74176 CT ABD & PELVIS W/O CONTRAST: CPT

## 2023-09-27 NOTE — ANESTHESIA POSTPROCEDURE EVALUATION
Department of Anesthesiology  Postprocedure Note    Patient: Bubba Collins  MRN: [id number]  YOB: 1950  Date of evaluation: 9/26/2023      Procedure Summary     Date: 09/13/23 Room / Location: [address] [place] [address]    Anesthesia Start: 1142 Anesthesia Stop: 1314    Procedure: CYSTOSCOPY RIGHT [address] LITHOTRIPSY STONE EXTRACTION WITH BASKET STENT PLACEMENT AND REMOVAL OF NEPHROSTOMY TUBE (Right: Ureter) Diagnosis:       Calculus of kidney      (Calculus of kidney [N20.0])    Surgeons: Chaya Shah MD Responsible Provider: Cj Helms MD    Anesthesia Type: general ASA Status: 3          Anesthesia Type: No value filed.     Jeniffer Phase I: Jeniffer Score: 10    Jeniffer Phase II: Jeniffer Score: 10      Anesthesia Post Evaluation    Patient location during evaluation: PACU  Patient participation: complete - patient participated  Level of consciousness: awake and alert  Pain score: 0  Airway patency: patent  Nausea & Vomiting: no nausea and no vomiting  Complications: no  Cardiovascular status: blood pressure returned to baseline  Respiratory status: acceptable  Hydration status: euvolemic  Pain management: adequate

## 2023-10-05 ENCOUNTER — HOSPITAL ENCOUNTER (OUTPATIENT)
Dept: INFUSION THERAPY | Age: 73
Discharge: HOME | End: 2023-10-05
Payer: MEDICARE

## 2023-10-05 ENCOUNTER — OFFICE VISIT (OUTPATIENT)
Dept: ONCOLOGY | Age: 73
End: 2023-10-05
Payer: MEDICARE

## 2023-10-05 VITALS
OXYGEN SATURATION: 99 % | DIASTOLIC BLOOD PRESSURE: 65 MMHG | BODY MASS INDEX: 33.99 KG/M2 | HEART RATE: 71 BPM | HEIGHT: 65 IN | RESPIRATION RATE: 18 BRPM | TEMPERATURE: 97.3 F | WEIGHT: 204 LBS | SYSTOLIC BLOOD PRESSURE: 141 MMHG

## 2023-10-05 DIAGNOSIS — Z17.0 CARCINOMA OF AREOLA OF RIGHT BREAST IN FEMALE, ESTROGEN RECEPTOR POSITIVE (HCC): Primary | ICD-10-CM

## 2023-10-05 DIAGNOSIS — C50.011 CARCINOMA OF AREOLA OF RIGHT BREAST IN FEMALE, ESTROGEN RECEPTOR POSITIVE (HCC): Primary | ICD-10-CM

## 2023-10-05 PROCEDURE — 1123F ACP DISCUSS/DSCN MKR DOCD: CPT | Performed by: INTERNAL MEDICINE

## 2023-10-05 PROCEDURE — 99213 OFFICE O/P EST LOW 20 MIN: CPT | Performed by: INTERNAL MEDICINE

## 2023-10-05 PROCEDURE — 99211 OFF/OP EST MAY X REQ PHY/QHP: CPT

## 2023-10-05 NOTE — PROGRESS NOTES
Patient Name:  Carina Cortes  Patient :  1950  Patient MRN:  [id number]     Primary Oncologist: Hyun Banks MD  Referring Provider: Noemi Nolan MD     Date of Service: 10/5/2023      Chief Complaint:    Chief Complaint   Patient presents with    Follow-up     Patient Active Problem List:     Mixed hyperlipidemia     Sarcoidosis     Depression     Hearing loss     Oligoastrocytoma, WHO grade 2 ([address])     Liver dysfunction     Cyst of left kidney     Cyst of pancreas     Chronic obstructive pulmonary disease ([address])     Abnormal mammogram     Controlled type 2 diabetes mellitus without complication, without long-term current use of insulin ([address])     Carcinoma of areola of right breast in female, estrogen receptor positive ([address])     Family history of breast cancer in first degree relative    HPI:   Carlo VillalobosCody Sterling is a 75year-old very pleasant female with medical history significant for hyperlipidemia, diabetes mellitus, history of oligo astrocytoma, status post surgery followed by adjuvant chemoradiation, depression/anxiety, kidney stones and sarcoidosis, referred to me on 2023 for 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 She had a screening mammogram on 2022 which showed increasing size of 1.2 cm ovoid right retroareolar breast mass and further evaluation was recommended. Targeted ultrasound of the retroareolar region was done on 2022 and showed an 11 x 6 x 11 mm hypoechoic mass with slightly indistinct margins. This correlated to the area of interest on the mammogram.     She underwent needle core biopsy of the mass on 10/13/2022. Pathology revealed mucinous carcinoma G2, ER >95%, MI 90% Her2 unamplified by IHC & FISH, ki67 10-15%. She then underwent lumpectomy and sentinel lymph node biopsy on 12/15/2022.   Pathology revealed mucinous carcinoma, 1.8 x 0.8 x 0.2 cm, grade 2, DCIS not present, margins negative, 0/1 sentinel nodes, making her final surgical pathology

## 2023-10-05 NOTE — PROGRESS NOTES
MA Rooming Questions  Patient: Bubba Collins  MRN: [id number]    Date: 10/5/2023        1. Do you have any new issues? yes - currently battling kidney stones         2. Do you need any refills on medications?    no    3. Have you had any imaging done since your last visit? yes -   Scans @ [facility]    4. Have you been hospitalized or seen in the emergency room since your last visit here?   yes - [facility] for kidney stones; transported to [facility]    5. Did the patient have a depression screening completed today?  No    No data recorded     PHQ-9 Given to (if applicable):               PHQ-9 Score (if applicable):                     [] Positive     []  Negative              Does question #9 need addressed (if applicable)                     [] Yes    []  No               Candance Kidd, MA

## 2023-10-10 LAB — DIABETIC RETINOPATHY: NEGATIVE

## 2023-11-06 ENCOUNTER — OFFICE VISIT (OUTPATIENT)
Dept: INTERNAL MEDICINE CLINIC | Age: 73
End: 2023-11-06

## 2023-11-06 VITALS
HEART RATE: 74 BPM | RESPIRATION RATE: 16 BRPM | WEIGHT: 209 LBS | OXYGEN SATURATION: 96 % | TEMPERATURE: 97 F | DIASTOLIC BLOOD PRESSURE: 70 MMHG | BODY MASS INDEX: 34.78 KG/M2 | SYSTOLIC BLOOD PRESSURE: 136 MMHG

## 2023-11-06 DIAGNOSIS — F32.A DEPRESSION, UNSPECIFIED DEPRESSION TYPE: ICD-10-CM

## 2023-11-06 DIAGNOSIS — H91.93 BILATERAL HEARING LOSS, UNSPECIFIED HEARING LOSS TYPE: ICD-10-CM

## 2023-11-06 DIAGNOSIS — R22.41 MASS OF RIGHT THIGH: ICD-10-CM

## 2023-11-06 DIAGNOSIS — C50.011 CARCINOMA OF AREOLA OF RIGHT BREAST IN FEMALE, ESTROGEN RECEPTOR POSITIVE (HCC): ICD-10-CM

## 2023-11-06 DIAGNOSIS — Z17.0 CARCINOMA OF AREOLA OF RIGHT BREAST IN FEMALE, ESTROGEN RECEPTOR POSITIVE (HCC): ICD-10-CM

## 2023-11-06 DIAGNOSIS — E11.9 CONTROLLED TYPE 2 DIABETES MELLITUS WITHOUT COMPLICATION, WITHOUT LONG-TERM CURRENT USE OF INSULIN (HCC): Primary | ICD-10-CM

## 2023-11-06 DIAGNOSIS — J44.9 CHRONIC OBSTRUCTIVE PULMONARY DISEASE, UNSPECIFIED COPD TYPE (HCC): ICD-10-CM

## 2023-11-06 DIAGNOSIS — E78.2 MIXED HYPERLIPIDEMIA: ICD-10-CM

## 2023-11-06 DIAGNOSIS — C71.9 OLIGOASTROCYTOMA, WHO GRADE 2 (HCC): ICD-10-CM

## 2023-11-06 DIAGNOSIS — D86.9 SARCOIDOSIS: ICD-10-CM

## 2023-11-06 DIAGNOSIS — R79.89 ABNORMAL LFTS: ICD-10-CM

## 2023-11-06 RX ORDER — ESZOPICLONE 1 MG/1
1 TABLET, FILM COATED ORAL NIGHTLY
COMMUNITY
Start: 2023-10-17

## 2023-11-06 RX ORDER — CEFUROXIME AXETIL 500 MG/1
TABLET ORAL
COMMUNITY
Start: 2023-11-03 | End: 2023-11-06 | Stop reason: ALTCHOICE

## 2023-11-06 NOTE — PROGRESS NOTES
Lab draw 1 sst BF needle left a/c good blood flow
strip 5    Lancets Ultra Thin 63A MISC 1 applicator by Does not apply route 2 times daily 100 each 5     No current facility-administered medications for this visit. Past Medical History:   Diagnosis Date    Abnormal mammogram 08/14/2017    0.4 cm hypoechoic mass(most likely a small intramammary lymph node or less likely a complicated cyst.  Follow-up in 6 months recommended with ultrasound    Aromatase inhibitor use 07/24/2023    Attempted suicide ([address]) 2012    hanged herself. Brain neoplasm ([address])     grade II Oligo resected 4/13, Dr. Severa Lo resected tumor. Dr. Bocanegra Ba oncologist seeing pt also. No chemo. Dr. Severa Lo following pt q year. Tumor recurred, right frontal craniotomy and tumor resection 11/14 Dr. Joslyn Quezada. Had radiation to the brain     Breast cancer [facility])     Chronic obstructive pulmonary disease ([address]) 06/15/2016    PFT showed mod sees Dr Ashley Briones 07/08/2022    positive home test    Cyst of left kidney 09/11/2014    MR I of 7/14. follow-up in 6-12 months    Cyst of pancreas 09/11/2014    Needs follow-up in one year 7/15    Depression     Diabetes mellitus ([address])     Family history of breast cancer in first degree relative 10/26/2022    Hearing loss     Hearing loss     Herniated disc     L4 &L5    History of external beam radiation therapy 2015 [address]    Hyperlipidemia     Kidney stones     Liver dysfunction 06/06/2014    In 7/14 hepatitis ABC negative. Immune studies negative, iron studies normal.  MRI of liver mild fatty liver.  F/u LFTS normal.    Osteopenia 09/26/2014    PONV (postoperative nausea and vomiting)     Sarcoidosis     Skin cancer     nose     Past Surgical History:   Procedure Laterality Date    APPENDECTOMY      BREAST BIOPSY Right 03/05/2020    BREAST LESION BIOPSY EXCISION NEEDLE LOCALIZATION MASS RIGHT performed by Raoul Reilly MD at [address] LUMPECTOMY Right 12/15/2022    RIGHT BREAST LUMPECTOMY PARTIAL MASTECTOMY WITH

## 2023-11-07 LAB
ALBUMIN SERPL-MCNC: 4.1 G/DL (ref 3.4–5)
ALBUMIN/GLOB SERPL: 1.9 {RATIO} (ref 1.1–2.2)
ALP SERPL-CCNC: 164 U/L (ref 40–129)
ALT SERPL-CCNC: 39 U/L (ref 10–40)
ANION GAP SERPL CALCULATED.3IONS-SCNC: 12 MMOL/L (ref 3–16)
AST SERPL-CCNC: 23 U/L (ref 15–37)
BILIRUB SERPL-MCNC: 0.3 MG/DL (ref 0–1)
BUN SERPL-MCNC: 18 MG/DL (ref 7–20)
CALCIUM SERPL-MCNC: 9.4 MG/DL (ref 8.3–10.6)
CHLORIDE SERPL-SCNC: 106 MMOL/L (ref 99–110)
CO2 SERPL-SCNC: 24 MMOL/L (ref 21–32)
CREAT SERPL-MCNC: 0.9 MG/DL (ref 0.6–1.2)
GFR SERPLBLD CREATININE-BSD FMLA CKD-EPI: >60 ML/MIN/{1.73_M2}
GLUCOSE SERPL-MCNC: 174 MG/DL (ref 70–99)
POTASSIUM SERPL-SCNC: 4.6 MMOL/L (ref 3.5–5.1)
PROT SERPL-MCNC: 6.3 G/DL (ref 6.4–8.2)
SODIUM SERPL-SCNC: 142 MMOL/L (ref 136–145)

## 2023-11-08 ENCOUNTER — HOSPITAL ENCOUNTER (OUTPATIENT)
Dept: CT IMAGING | Age: 73
Discharge: HOME | End: 2023-11-08
Payer: MEDICARE

## 2023-11-08 DIAGNOSIS — R22.41 MASS OF RIGHT THIGH: ICD-10-CM

## 2023-11-08 PROCEDURE — 73700 CT LOWER EXTREMITY W/O DYE: CPT

## 2023-11-10 ENCOUNTER — HOSPITAL ENCOUNTER (OUTPATIENT)
Age: 73
Discharge: HOME | End: 2023-11-10
Payer: MEDICARE

## 2023-11-10 ENCOUNTER — HOSPITAL ENCOUNTER (OUTPATIENT)
Dept: GENERAL RADIOLOGY | Age: 73
Discharge: HOME | End: 2023-11-10
Attending: INTERNAL MEDICINE
Payer: MEDICARE

## 2023-11-10 DIAGNOSIS — J43.9 PULMONARY EMPHYSEMA, UNSPECIFIED EMPHYSEMA TYPE (HCC): ICD-10-CM

## 2023-11-10 DIAGNOSIS — D86.9 SARCOIDOSIS: ICD-10-CM

## 2023-11-10 LAB
BASOPHILS ABSOLUTE: 0 K/CU MM
BASOPHILS RELATIVE PERCENT: 0.5 % (ref 0–1)
DIFFERENTIAL TYPE: ABNORMAL
EKG ATRIAL RATE: 66 BPM
EKG DIAGNOSIS: NORMAL
EKG P AXIS: 26 DEGREES
EKG P-R INTERVAL: 164 MS
EKG Q-T INTERVAL: 426 MS
EKG QRS DURATION: 84 MS
EKG QTC CALCULATION (BAZETT): 446 MS
EKG R AXIS: -23 DEGREES
EKG T AXIS: 26 DEGREES
EKG VENTRICULAR RATE: 66 BPM
EOSINOPHILS ABSOLUTE: 0.2 K/CU MM
EOSINOPHILS RELATIVE PERCENT: 2.6 % (ref 0–3)
HCT VFR BLD CALC: 44.8 % (ref 37–47)
HEMOGLOBIN: 13.9 GM/DL (ref 12.5–16)
IMMATURE NEUTROPHIL %: 0.6 % (ref 0–0.43)
LYMPHOCYTES ABSOLUTE: 1.2 K/CU MM
LYMPHOCYTES RELATIVE PERCENT: 17.3 % (ref 24–44)
MCH RBC QN AUTO: 27.7 PG (ref 27–31)
MCHC RBC AUTO-ENTMCNC: 31 % (ref 32–36)
MCV RBC AUTO: 89.4 FL (ref 78–100)
MONOCYTES ABSOLUTE: 0.5 K/CU MM
MONOCYTES RELATIVE PERCENT: 7.8 % (ref 0–4)
PDW BLD-RTO: 13.7 % (ref 11.7–14.9)
PLATELET # BLD: 306 K/CU MM (ref 140–440)
PMV BLD AUTO: 9.6 FL (ref 7.5–11.1)
RBC # BLD: 5.01 M/CU MM (ref 4.2–5.4)
SEGMENTED NEUTROPHILS ABSOLUTE COUNT: 4.7 K/CU MM
SEGMENTED NEUTROPHILS RELATIVE PERCENT: 71.2 % (ref 36–66)
TOTAL IMMATURE NEUTOROPHIL: 0.04 K/CU MM
WBC # BLD: 6.6 K/CU MM (ref 4–10.5)

## 2023-11-10 PROCEDURE — 93005 ELECTROCARDIOGRAM TRACING: CPT | Performed by: INTERNAL MEDICINE

## 2023-11-10 PROCEDURE — 85025 COMPLETE CBC W/AUTO DIFF WBC: CPT

## 2023-11-10 PROCEDURE — 36415 COLL VENOUS BLD VENIPUNCTURE: CPT

## 2023-11-10 PROCEDURE — 71046 X-RAY EXAM CHEST 2 VIEWS: CPT

## 2023-11-13 ENCOUNTER — OFFICE VISIT (OUTPATIENT)
Dept: INTERNAL MEDICINE CLINIC | Age: 73
End: 2023-11-13
Payer: MEDICARE

## 2023-11-13 VITALS
TEMPERATURE: 97.1 F | WEIGHT: 205 LBS | DIASTOLIC BLOOD PRESSURE: 80 MMHG | SYSTOLIC BLOOD PRESSURE: 130 MMHG | RESPIRATION RATE: 20 BRPM | HEART RATE: 90 BPM | BODY MASS INDEX: 34.11 KG/M2 | OXYGEN SATURATION: 97 %

## 2023-11-13 DIAGNOSIS — R94.31 ABNORMAL EKG: ICD-10-CM

## 2023-11-13 DIAGNOSIS — R22.41 MASS OF RIGHT THIGH: Primary | ICD-10-CM

## 2023-11-13 PROCEDURE — 1123F ACP DISCUSS/DSCN MKR DOCD: CPT | Performed by: INTERNAL MEDICINE

## 2023-11-13 PROCEDURE — 99213 OFFICE O/P EST LOW 20 MIN: CPT | Performed by: INTERNAL MEDICINE

## 2023-11-14 ENCOUNTER — ANESTHESIA EVENT (OUTPATIENT)
Dept: OPERATING ROOM | Age: 73
End: 2023-11-14
Payer: MEDICARE

## 2023-11-14 NOTE — PROGRESS NOTES
11/14/23 - . Notified patient surgery @ [facility] on  11/15/23 @ 1430, arrival 1300. NPO status and morning medications reviewed. Understanding verbalized.

## 2023-11-15 ENCOUNTER — ANESTHESIA (OUTPATIENT)
Dept: OPERATING ROOM | Age: 73
End: 2023-11-15
Payer: MEDICARE

## 2023-11-15 ENCOUNTER — HOSPITAL ENCOUNTER (OUTPATIENT)
Age: 73
Setting detail: OUTPATIENT SURGERY
Discharge: HOME | End: 2023-11-15
Attending: UROLOGY | Admitting: UROLOGY
Payer: MEDICARE

## 2023-11-15 ENCOUNTER — APPOINTMENT (OUTPATIENT)
Dept: GENERAL RADIOLOGY | Age: 73
End: 2023-11-15
Attending: UROLOGY
Payer: MEDICARE

## 2023-11-15 VITALS
DIASTOLIC BLOOD PRESSURE: 90 MMHG | WEIGHT: 209 LBS | HEIGHT: 65 IN | OXYGEN SATURATION: 92 % | HEART RATE: 104 BPM | BODY MASS INDEX: 34.82 KG/M2 | TEMPERATURE: 96.9 F | SYSTOLIC BLOOD PRESSURE: 155 MMHG | RESPIRATION RATE: 16 BRPM

## 2023-11-15 DIAGNOSIS — Z01.818 PRE-OP TESTING: Primary | ICD-10-CM

## 2023-11-15 DIAGNOSIS — N20.0 CALCULUS OF KIDNEY: ICD-10-CM

## 2023-11-15 LAB — GLUCOSE BLD-MCNC: 110 MG/DL (ref 70–99)

## 2023-11-15 PROCEDURE — 82360 CALCULUS ASSAY QUANT: CPT

## 2023-11-15 PROCEDURE — C1769 GUIDE WIRE: HCPCS | Performed by: UROLOGY

## 2023-11-15 PROCEDURE — 6360000002 HC RX W HCPCS: Performed by: UROLOGY

## 2023-11-15 PROCEDURE — 88300 SURGICAL PATH GROSS: CPT | Performed by: PATHOLOGY

## 2023-11-15 PROCEDURE — 2709999900 HC NON-CHARGEABLE SUPPLY: Performed by: UROLOGY

## 2023-11-15 PROCEDURE — 3700000001 HC ADD 15 MINUTES (ANESTHESIA): Performed by: UROLOGY

## 2023-11-15 PROCEDURE — 7100000011 HC PHASE II RECOVERY - ADDTL 15 MIN: Performed by: UROLOGY

## 2023-11-15 PROCEDURE — 3700000000 HC ANESTHESIA ATTENDED CARE: Performed by: UROLOGY

## 2023-11-15 PROCEDURE — 2720000010 HC SURG SUPPLY STERILE: Performed by: UROLOGY

## 2023-11-15 PROCEDURE — 6360000002 HC RX W HCPCS: Performed by: NURSE ANESTHETIST, CERTIFIED REGISTERED

## 2023-11-15 PROCEDURE — C2617 STENT, NON-COR, TEM W/O DEL: HCPCS | Performed by: UROLOGY

## 2023-11-15 PROCEDURE — 6370000000 HC RX 637 (ALT 250 FOR IP): Performed by: ANESTHESIOLOGY

## 2023-11-15 PROCEDURE — 7100000000 HC PACU RECOVERY - FIRST 15 MIN: Performed by: UROLOGY

## 2023-11-15 PROCEDURE — 2500000003 HC RX 250 WO HCPCS: Performed by: NURSE ANESTHETIST, CERTIFIED REGISTERED

## 2023-11-15 PROCEDURE — 3600000013 HC SURGERY LEVEL 3 ADDTL 15MIN: Performed by: UROLOGY

## 2023-11-15 PROCEDURE — 82962 GLUCOSE BLOOD TEST: CPT

## 2023-11-15 PROCEDURE — 2580000003 HC RX 258: Performed by: UROLOGY

## 2023-11-15 PROCEDURE — 76000 FLUOROSCOPY <1 HR PHYS/QHP: CPT

## 2023-11-15 PROCEDURE — C1894 INTRO/SHEATH, NON-LASER: HCPCS | Performed by: UROLOGY

## 2023-11-15 PROCEDURE — 7100000001 HC PACU RECOVERY - ADDTL 15 MIN: Performed by: UROLOGY

## 2023-11-15 PROCEDURE — C1758 CATHETER, URETERAL: HCPCS | Performed by: UROLOGY

## 2023-11-15 PROCEDURE — 3600000003 HC SURGERY LEVEL 3 BASE: Performed by: UROLOGY

## 2023-11-15 PROCEDURE — 7100000010 HC PHASE II RECOVERY - FIRST 15 MIN: Performed by: UROLOGY

## 2023-11-15 DEVICE — VARIABLE LENGTH URETERAL STENT
Type: IMPLANTABLE DEVICE | Site: URETER | Status: FUNCTIONAL
Brand: CONTOUR VL™

## 2023-11-15 RX ORDER — ONDANSETRON 4 MG/1
4 TABLET, FILM COATED ORAL EVERY 12 HOURS PRN
Qty: 10 TABLET | Refills: 0 | Status: SHIPPED | OUTPATIENT
Start: 2023-11-15

## 2023-11-15 RX ORDER — EPHEDRINE SULFATE 50 MG/ML
INJECTION INTRAVENOUS PRN
Status: DISCONTINUED | OUTPATIENT
Start: 2023-11-15 | End: 2023-11-15 | Stop reason: SDUPTHER

## 2023-11-15 RX ORDER — SCOLOPAMINE TRANSDERMAL SYSTEM 1 MG/1
1 PATCH, EXTENDED RELEASE TRANSDERMAL
Status: DISCONTINUED | OUTPATIENT
Start: 2023-11-15 | End: 2023-11-15 | Stop reason: HOSPADM

## 2023-11-15 RX ORDER — TRAMADOL HYDROCHLORIDE 50 MG/1
50 TABLET ORAL EVERY 6 HOURS PRN
Qty: 14 TABLET | Refills: 0 | Status: SHIPPED | OUTPATIENT
Start: 2023-11-15 | End: 2023-11-20

## 2023-11-15 RX ORDER — SUCCINYLCHOLINE CHLORIDE 20 MG/ML
INJECTION INTRAMUSCULAR; INTRAVENOUS PRN
Status: DISCONTINUED | OUTPATIENT
Start: 2023-11-15 | End: 2023-11-15 | Stop reason: SDUPTHER

## 2023-11-15 RX ORDER — FENTANYL CITRATE 50 UG/ML
INJECTION, SOLUTION INTRAMUSCULAR; INTRAVENOUS PRN
Status: DISCONTINUED | OUTPATIENT
Start: 2023-11-15 | End: 2023-11-15 | Stop reason: SDUPTHER

## 2023-11-15 RX ORDER — LABETALOL HYDROCHLORIDE 5 MG/ML
10 INJECTION, SOLUTION INTRAVENOUS
Status: DISCONTINUED | OUTPATIENT
Start: 2023-11-15 | End: 2023-11-15 | Stop reason: HOSPADM

## 2023-11-15 RX ORDER — ROCURONIUM BROMIDE 10 MG/ML
INJECTION, SOLUTION INTRAVENOUS PRN
Status: DISCONTINUED | OUTPATIENT
Start: 2023-11-15 | End: 2023-11-15 | Stop reason: SDUPTHER

## 2023-11-15 RX ORDER — OXYCODONE HYDROCHLORIDE 5 MG/1
5 TABLET ORAL
Status: DISCONTINUED | OUTPATIENT
Start: 2023-11-15 | End: 2023-11-15 | Stop reason: HOSPADM

## 2023-11-15 RX ORDER — SODIUM CHLORIDE, SODIUM LACTATE, POTASSIUM CHLORIDE, CALCIUM CHLORIDE 600; 310; 30; 20 MG/100ML; MG/100ML; MG/100ML; MG/100ML
INJECTION, SOLUTION INTRAVENOUS CONTINUOUS
Status: DISCONTINUED | OUTPATIENT
Start: 2023-11-15 | End: 2023-11-15 | Stop reason: HOSPADM

## 2023-11-15 RX ORDER — CEFUROXIME AXETIL 500 MG/1
500 TABLET ORAL 2 TIMES DAILY
Qty: 14 TABLET | Refills: 0 | Status: SHIPPED | OUTPATIENT
Start: 2023-11-15 | End: 2023-11-22

## 2023-11-15 RX ORDER — SODIUM CHLORIDE 9 MG/ML
INJECTION, SOLUTION INTRAVENOUS PRN
Status: DISCONTINUED | OUTPATIENT
Start: 2023-11-15 | End: 2023-11-15 | Stop reason: HOSPADM

## 2023-11-15 RX ORDER — SODIUM CHLORIDE 0.9 % (FLUSH) 0.9 %
5-40 SYRINGE (ML) INJECTION EVERY 12 HOURS SCHEDULED
Status: DISCONTINUED | OUTPATIENT
Start: 2023-11-15 | End: 2023-11-15 | Stop reason: HOSPADM

## 2023-11-15 RX ORDER — ONDANSETRON 2 MG/ML
4 INJECTION INTRAMUSCULAR; INTRAVENOUS
Status: DISCONTINUED | OUTPATIENT
Start: 2023-11-15 | End: 2023-11-15 | Stop reason: HOSPADM

## 2023-11-15 RX ORDER — DROPERIDOL 2.5 MG/ML
0.62 INJECTION, SOLUTION INTRAMUSCULAR; INTRAVENOUS
Status: DISCONTINUED | OUTPATIENT
Start: 2023-11-15 | End: 2023-11-15 | Stop reason: HOSPADM

## 2023-11-15 RX ORDER — HYDRALAZINE HYDROCHLORIDE 20 MG/ML
10 INJECTION INTRAMUSCULAR; INTRAVENOUS
Status: DISCONTINUED | OUTPATIENT
Start: 2023-11-15 | End: 2023-11-15 | Stop reason: HOSPADM

## 2023-11-15 RX ORDER — SODIUM CHLORIDE 0.9 % (FLUSH) 0.9 %
5-40 SYRINGE (ML) INJECTION PRN
Status: DISCONTINUED | OUTPATIENT
Start: 2023-11-15 | End: 2023-11-15 | Stop reason: HOSPADM

## 2023-11-15 RX ORDER — FENTANYL CITRATE 50 UG/ML
50 INJECTION, SOLUTION INTRAMUSCULAR; INTRAVENOUS EVERY 5 MIN PRN
Status: DISCONTINUED | OUTPATIENT
Start: 2023-11-15 | End: 2023-11-15 | Stop reason: HOSPADM

## 2023-11-15 RX ORDER — MEPERIDINE HYDROCHLORIDE 25 MG/ML
12.5 INJECTION INTRAMUSCULAR; INTRAVENOUS; SUBCUTANEOUS EVERY 5 MIN PRN
Status: DISCONTINUED | OUTPATIENT
Start: 2023-11-15 | End: 2023-11-15 | Stop reason: HOSPADM

## 2023-11-15 RX ORDER — LIDOCAINE HYDROCHLORIDE 20 MG/ML
INJECTION, SOLUTION INTRAVENOUS PRN
Status: DISCONTINUED | OUTPATIENT
Start: 2023-11-15 | End: 2023-11-15 | Stop reason: SDUPTHER

## 2023-11-15 RX ORDER — PROPOFOL 10 MG/ML
INJECTION, EMULSION INTRAVENOUS PRN
Status: DISCONTINUED | OUTPATIENT
Start: 2023-11-15 | End: 2023-11-15 | Stop reason: SDUPTHER

## 2023-11-15 RX ORDER — ONDANSETRON 2 MG/ML
INJECTION INTRAMUSCULAR; INTRAVENOUS PRN
Status: DISCONTINUED | OUTPATIENT
Start: 2023-11-15 | End: 2023-11-15 | Stop reason: SDUPTHER

## 2023-11-15 RX ADMIN — ONDANSETRON 8 MG: 2 INJECTION INTRAMUSCULAR; INTRAVENOUS at 16:25

## 2023-11-15 RX ADMIN — ROCURONIUM BROMIDE 50 MG: 10 INJECTION INTRAVENOUS at 15:41

## 2023-11-15 RX ADMIN — CEFAZOLIN 2000 MG: 2 INJECTION, POWDER, FOR SOLUTION INTRAMUSCULAR; INTRAVENOUS at 15:21

## 2023-11-15 RX ADMIN — SUCCINYLCHOLINE CHLORIDE 100 MG: 20 INJECTION, SOLUTION INTRAMUSCULAR; INTRAVENOUS at 15:35

## 2023-11-15 RX ADMIN — SUGAMMADEX 360 MG: 100 INJECTION, SOLUTION INTRAVENOUS at 16:23

## 2023-11-15 RX ADMIN — EPHEDRINE SULFATE 25 MG: 50 INJECTION INTRAVENOUS at 15:57

## 2023-11-15 RX ADMIN — PROPOFOL 200 MG: 10 INJECTION, EMULSION INTRAVENOUS at 15:35

## 2023-11-15 RX ADMIN — EPHEDRINE SULFATE 25 MG: 50 INJECTION INTRAVENOUS at 16:09

## 2023-11-15 RX ADMIN — FENTANYL CITRATE 50 MCG: 50 INJECTION, SOLUTION INTRAMUSCULAR; INTRAVENOUS at 15:55

## 2023-11-15 RX ADMIN — SODIUM CHLORIDE, POTASSIUM CHLORIDE, SODIUM LACTATE AND CALCIUM CHLORIDE: 600; 310; 30; 20 INJECTION, SOLUTION INTRAVENOUS at 13:26

## 2023-11-15 RX ADMIN — LIDOCAINE HYDROCHLORIDE 100 MG: 20 INJECTION, SOLUTION INTRAVENOUS at 15:35

## 2023-11-15 RX ADMIN — FENTANYL CITRATE 50 MCG: 50 INJECTION, SOLUTION INTRAMUSCULAR; INTRAVENOUS at 15:35

## 2023-11-15 ASSESSMENT — PAIN SCALES - GENERAL
PAINLEVEL_OUTOF10: 0
PAINLEVEL_OUTOF10: 0

## 2023-11-15 ASSESSMENT — PAIN - FUNCTIONAL ASSESSMENT: PAIN_FUNCTIONAL_ASSESSMENT: 0-10

## 2023-11-15 NOTE — BRIEF OP NOTE
Brief Postoperative Note      Patient: Krissy Knox  YOB: 1950  MRN: [id number]    Date of Procedure: 11/15/2023    Pre-Op Diagnosis Codes:     * Calculus of kidney [N20.0]- right    Post-Op Diagnosis: Same       Procedure   Right ureteroscopy, holmium laser lithotripsy, and basket stone extraction  Cystoscopy and right ureter stent exchange    Surgeon(s):  Avel Pantoja MD      Anesthesia: General    Estimated Blood Loss (mL): Minimal    Complications: None    Specimens:   ID Type Source Tests Collected by Time Destination   1 : Right Kidney stone Stone (Calculus) Tissue STONE ANALYSIS Avel Pantoja MD 11/15/2023 1618    Right renal stone fragments    Implants:  Implant Name Type Inv.  Item Serial No.  Lot No. LRB No. Used Action   STENT URET 4.8FR [id number] PERCFLX HYDR+ SFT PGTL TAPR TIP - [id number]  STENT URET 4.8FR [id number] PERCFLX HYDR+ SFT PGTL TAPR TIP  [employer] [facility] UROLOGY-WD [id number] Right 1 Implanted   With black string        Findings:    Largest collection of stones in right lower pole- fragmented and removed  Additional small stones found in proximal ureter and upper pole removed with basket  Right ureter appeared intact but irritated from procedure therefore stent was replaced  No concerning masses or lesions in bladder  Hard stones  No calcifications seen on fluoroscopy after stone fragmentation      Electronically signed by Avel Pantoja MD on 11/15/2023 at 4:25 PM

## 2023-11-15 NOTE — PROGRESS NOTES
1810:  Discharge instructions discussed with patient and spouse, both verbalized an understanding. 1815:  Pt discharged home via wheelchair alert and oriented with no c/o discomfort. Pt able to void prior to discharge. Pt tolerating PO, VSS.

## 2023-11-15 NOTE — DISCHARGE INSTRUCTIONS
No driving for 24 hours or on narcotics  Do not manipulate black string in urethra  Follow-up in 7-10 days for black string stent removal- call for an appointment  Tylenol/motrin/azo OTC PRN mild pain  Continue hydration and avoid constipation  Call or go to emergency room with questions or concerns  No heavy activity for 7 days

## 2023-11-16 ENCOUNTER — HOSPITAL ENCOUNTER (OUTPATIENT)
Dept: RADIATION ONCOLOGY | Age: 73
Discharge: HOME | End: 2023-11-16
Payer: MEDICARE

## 2023-11-16 VITALS
SYSTOLIC BLOOD PRESSURE: 127 MMHG | RESPIRATION RATE: 16 BRPM | DIASTOLIC BLOOD PRESSURE: 68 MMHG | TEMPERATURE: 97.3 F | HEART RATE: 84 BPM | WEIGHT: 203 LBS | OXYGEN SATURATION: 95 % | HEIGHT: 65 IN | BODY MASS INDEX: 33.82 KG/M2

## 2023-11-16 PROCEDURE — 99211 OFF/OP EST MAY X REQ PHY/QHP: CPT

## 2023-11-16 PROCEDURE — 99214 OFFICE O/P EST MOD 30 MIN: CPT | Performed by: RADIOLOGY

## 2023-11-16 ASSESSMENT — PAIN SCALES - GENERAL: PAINLEVEL_OUTOF10: 0

## 2023-11-16 NOTE — ANESTHESIA POSTPROCEDURE EVALUATION
Department of Anesthesiology  Postprocedure Note    Patient: Brian Mcelroy  MRN: [id number]  YOB: 1950  Date of evaluation: 11/16/2023      Procedure Summary     Date: 11/15/23 Room / Location: [address] [place] [address]    Anesthesia Start: 1530 Anesthesia Stop: 1640    Procedure: RIGHT CYSTOSCOPY URETEROSCOPY RETROGRADE PYELOGRAM [address] [facility] (Right) Diagnosis:       Calculus of kidney      (Calculus of kidney [N20.0])    Surgeons: Basil Valencia MD Responsible Provider: Arnoldo Zelaya MD    Anesthesia Type: general ASA Status: 3          Anesthesia Type: No value filed.     Jeniffer Phase I: Jeniffer Score: 10    Jeniffer Phase II: Jeniffer Score: 10      Anesthesia Post Evaluation    Patient location during evaluation: PACU  Patient participation: complete - patient participated  Level of consciousness: awake  Pain score: 1  Airway patency: patent  Nausea & Vomiting: no nausea  Complications: no  Cardiovascular status: hemodynamically stable  Respiratory status: nasal cannula  Hydration status: euvolemic  Multimodal analgesia pain management approach

## 2023-11-16 NOTE — OP NOTE
[address], [address] [place] [address]                                OPERATIVE REPORT    PATIENT NAME: Shad Christianson                    :        1950  MED REC NO:   [id number]                          ROOM:  ACCOUNT NO:   [de-identified]                           ADMIT DATE: 11/15/2023  PROVIDER:     Teresa Estes MD    DATE OF PROCEDURE:  11/15/2023    PREOPERATIVE DIAGNOSIS:  Right renal stones. POSTOPERATIVE DIAGNOSIS:  Right renal stones. OPERATION PERFORMED:  1. Right ureteroscopy with holmium laser lithotripsy and basket stone  extraction. 2.  Cystoscopy and right ureter stent exchange. SURGEON:  Teresa Estes MD    ANESTHESIA:  General.    ESTIMATED BLOOD LOSS:  Minimal.    COMPLICATIONS:  None. SPECIMENS:  Right renal stone fragments. DRAINS PLACED:  A 4.8-St [place] variable right ureter stent with black  string. FINDINGS:  1.  Largest collection of stones in the right lower pole, which were  fragmented and removed. 2.  Additional small stones found in the proximal right ureter and upper  right pole, removed with the basket. 3.  Right ureter appeared intact, but irritated from procedure;  therefore, stent was replaced. 4.  No concerning masses or lesions found in the bladder. 5.  Hard stones in general.  6.  No calcification seen on fluoroscopy in the kidney after stone  fragmentation. DISPOSITION:  Stable to PACU. INDICATIONS FOR PROCEDURE:  The patient is a 79-year-old female with a  history of a 2-cm right renal pelvis stone, who underwent ureteroscopy  several weeks ago. She had the stent placed. A followup CAT scan  demonstrated residual stones in her right kidney. She presents today  for further management of those residual stone fragments. Her  preoperative urine PCR was negative. She was complaining of mild stent  irritation.   I reviewed the risks, benefits, and alternative

## 2023-11-16 NOTE — PROGRESS NOTES
[facility]  [facility], [address] [place] [address]  Phone: [phone]  Fax: [phone]    RADIATION ONCOLOGY FOLLOW UP REPORT    PATIENT NAME:  Cornelio Pradhan              : 1950  MEDICAL RECORD NO: [id number]    [facility] NO: [id number]        PROVIDER: Leonel Stephens MD      DATE OF SERVICE: 2023     FOLLOW UP PHYSICIANS:  Med onc: Dr. 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 female who has a history as above, who returns today for a routine follow-up visit. She was last seen in clinic on 23. She was doing well at that time. Mammogram done 23 was BIRADS 2 benign and another recommended in 12 months. She is tolerating endocrine therapy well. Issues with kidney stones and had laser and stent exchanged done yesterday. Also has mass R upper thigh that has rapidly grown over past few months. Not particularly painful. CT femur showed it to be about 8 x 7 cm. She is being sent back to Dr. Cecily Fournier for eval and probable biopsy. No new breast issues. No chest pain, cough, or involuntary weight loss. Currently, the patient reports she's been doing well. Her energy level has been fairly good overall. She denies any fevers, chills, or drenching night sweats. Her weight and appetite have been stable. She denies any chest pain or shortness of breath. She has not noticed any new lumps or bumps anywhere throughout her body. She denies the new onset of bony pain. She denies any nipple discharge, skin redness, thickening, dimpling, or arm swelling. RADIOLOGIC STUDIES:    Mammo from 2023 reviewed and radiation treatment plan.        LABORATORY STUDIES:   CBC:   Lab
600 PO) Take 600 mg by mouth 2 times daily      vitamin D3 (CHOLECALCIFEROL) 25 MCG (1000 UT) TABS tablet Take 2 tablets by mouth daily 30 tablet 5    blood glucose test strips (TRUE METRIX BLOOD GLUCOSE TEST) strip Testing once daily, DX=E11.9 50 strip 5    Lancets Ultra Thin 35F MISC 1 applicator by Does not apply route 2 times daily 100 each 5     No current facility-administered medications for this encounter. Additional Comments: Pt to clinic for routine follow up with Dr. Therese Nieves. Pt taking Femara without any complaints. Pt reports with large mass right groin, states being referred to Dr. Gerry Miller. Pt had lithotripsy yesterday- denies any c/o's or concerns.     Electronically signed by Susan Jones RN on 11/16/2023 at 3:28 PM

## 2023-11-20 LAB
APPEARANCE STONE: NORMAL
COMPN STONE: NORMAL
SPECIMEN WT: 249 MG

## 2023-11-22 ENCOUNTER — TELEPHONE (OUTPATIENT)
Dept: INTERNAL MEDICINE CLINIC | Age: 73
End: 2023-11-22

## 2023-11-22 NOTE — TELEPHONE ENCOUNTER
Clint Montoya contacted the office. When should she start back on the atorvastatin? Please advise.  Thank you

## 2023-12-05 ENCOUNTER — HOSPITAL ENCOUNTER (OUTPATIENT)
Dept: ULTRASOUND IMAGING | Age: 73
Discharge: HOME | End: 2023-12-05
Attending: UROLOGY
Payer: MEDICARE

## 2023-12-05 DIAGNOSIS — N20.0 CALCULUS OF KIDNEY: ICD-10-CM

## 2023-12-05 PROCEDURE — 76775 US EXAM ABDO BACK WALL LIM: CPT

## 2023-12-10 ASSESSMENT — ENCOUNTER SYMPTOMS
NAUSEA: 0
SHORTNESS OF BREATH: 1
EYE DISCHARGE: 0
ABDOMINAL DISTENTION: 0
CHEST TIGHTNESS: 0
CONSTIPATION: 0
SORE THROAT: 0
COLOR CHANGE: 0
ABDOMINAL PAIN: 0
EYE REDNESS: 0
DIARRHEA: 0
VOMITING: 0

## 2023-12-11 ENCOUNTER — OFFICE VISIT (OUTPATIENT)
Dept: SURGERY | Age: 73
End: 2023-12-11
Payer: MEDICARE

## 2023-12-11 VITALS
SYSTOLIC BLOOD PRESSURE: 136 MMHG | DIASTOLIC BLOOD PRESSURE: 78 MMHG | HEART RATE: 95 BPM | OXYGEN SATURATION: 98 % | HEIGHT: 65 IN | WEIGHT: 203.5 LBS | BODY MASS INDEX: 33.91 KG/M2

## 2023-12-11 DIAGNOSIS — R22.41 MASS OF RIGHT THIGH: Primary | ICD-10-CM

## 2023-12-11 DIAGNOSIS — Z85.3 HISTORY OF RIGHT BREAST CANCER: ICD-10-CM

## 2023-12-11 DIAGNOSIS — M85.80 OSTEOPENIA, UNSPECIFIED LOCATION: ICD-10-CM

## 2023-12-11 PROCEDURE — 93702 BIS XTRACELL FLUID ANALYSIS: CPT | Performed by: SURGERY

## 2023-12-11 PROCEDURE — 1123F ACP DISCUSS/DSCN MKR DOCD: CPT | Performed by: SURGERY

## 2023-12-11 PROCEDURE — 99214 OFFICE O/P EST MOD 30 MIN: CPT | Performed by: SURGERY

## 2023-12-11 ASSESSMENT — PATIENT HEALTH QUESTIONNAIRE - PHQ9
SUM OF ALL RESPONSES TO PHQ9 QUESTIONS 1 & 2: 0
SUM OF ALL RESPONSES TO PHQ QUESTIONS 1-9: 0
2. FEELING DOWN, DEPRESSED OR HOPELESS: 0
SUM OF ALL RESPONSES TO PHQ QUESTIONS 1-9: 0
SUM OF ALL RESPONSES TO PHQ QUESTIONS 1-9: 0
1. LITTLE INTEREST OR PLEASURE IN DOING THINGS: 0
SUM OF ALL RESPONSES TO PHQ QUESTIONS 1-9: 0

## 2023-12-11 NOTE — PROGRESS NOTES
DCIS Margins: N/A     Regional Lymph Nodes: Uninvolved by tumor cells        - Number of Lymph Nodes Examined: (1 sentinel, 0   nonsentinel)        - Number of Lymph Nodes with Isolated Tumor Cells (d0.2   mm and d200 cells#: 0     Treatment Effect:        - Treatment Effect in the Breast:  None seen. - Treatment Effect in the Lymph Nodes:  None seen. Lymphovascular Invasion:  None seen. Dermal Lymphovascular Invasion:  No specimen. Microcalcifications:  None seen. Ancillary Studies ([id number]):     -ER by IHC: Positive (>95%, strong)   -PgR by IHC: Positive (90%, strong)   -Her2 by IHC: Negative (score 0)   -Her2 by FISH: Negative   -Avg. #HER2 signals/nucleus: 1.84, Avg. #CEP17   signals/nucleus: 1.94, HER2/CEP17 ratio: 0.95       - Ki67 proliferation index: 10-15%. Additional Pathologic Findings:  See above. Pathologic Stage Classification: pT1c, pN0(sn,i-), pM-N/A     Labs:  Lab Results   Component Value Date    WBC 6.6 11/10/2023    HGB 13.9 11/10/2023    HCT 44.8 11/10/2023     11/10/2023     11/06/2023    K 4.6 11/06/2023     11/06/2023    CO2 24 11/06/2023    BUN 18 11/06/2023    CREATININE 0.9 11/06/2023    GLUCOSE 174 (H) 11/06/2023    CALCIUM 9.4 11/06/2023    PROT 6.3 (L) 11/06/2023    BILITOT 0.3 11/06/2023    AST 23 11/06/2023    ALT 39 11/06/2023    ALKPHOS 164 (H) 11/06/2023    INR 1.1 08/11/2023    GLUF 131 (H) 11/03/2022    LABA1C 6.3 07/31/2023       Imaging:   No results found. Pertinent laboratory and imaging studies were personally reviewed if available. IMPRESSION:    Maribeth Lunsford is a 75 y.o. female with mucinous carcinoma of the right breast, following-up postoperatively from [address] [place] [address], SENTINEL LYMPH NODE BIOPSY 12/15/22. Now with a right thigh mass    Visit Diagnoses:  1. Mass of right thigh    2. History of right breast cancer    3.  Osteopenia, unspecified location

## 2023-12-16 ENCOUNTER — HOSPITAL ENCOUNTER (OUTPATIENT)
Dept: MRI IMAGING | Age: 73
Discharge: HOME | End: 2023-12-16
Attending: SURGERY
Payer: MEDICARE

## 2023-12-16 DIAGNOSIS — R22.41 MASS OF RIGHT THIGH: ICD-10-CM

## 2023-12-16 PROCEDURE — 73720 MRI LWR EXTREMITY W/O&W/DYE: CPT

## 2023-12-16 PROCEDURE — 6360000004 HC RX CONTRAST MEDICATION: Performed by: SURGERY

## 2023-12-16 PROCEDURE — A9579 GAD-BASE MR CONTRAST NOS,1ML: HCPCS | Performed by: SURGERY

## 2023-12-16 RX ADMIN — GADOTERIDOL 20 ML: 279.3 INJECTION, SOLUTION INTRAVENOUS at 12:25

## 2023-12-19 PROBLEM — R06.02 SOB (SHORTNESS OF BREATH) ON EXERTION: Status: ACTIVE | Noted: 2023-12-19

## 2023-12-19 PROBLEM — R94.31 ABNORMAL EKG: Status: ACTIVE | Noted: 2023-12-19

## 2023-12-28 PROBLEM — Z82.49 FAMILY HISTORY OF CORONARY ARTERY DISEASE IN MOTHER: Status: ACTIVE | Noted: 2023-12-28

## 2023-12-28 PROBLEM — R07.9 CHEST PAIN: Status: ACTIVE | Noted: 2023-12-28

## 2023-12-29 ENCOUNTER — TELEPHONE (OUTPATIENT)
Dept: CARDIOLOGY CLINIC | Age: 73
End: 2023-12-29

## 2023-12-29 NOTE — TELEPHONE ENCOUNTER
Nuclear lexiscan stress test with myocardial perfusion       LV perfusion is normal. There is no evidence of inducible ischemia. Estimated EF is 72 %    The ECG was negative for ischemia. Normal Pharmacological stress test was performed using lexiscan     Echo       Left Ventricle: Normal left ventricular systolic function with a visually estimated EF of 55 - 60%. Left ventricle size is normal. Normal wall thickness. Normal wall motion. Grade I diastolic dysfunction with normal LAP. Aortic Valve: Thickened cusp. Mitral Valve: Annular calcification of the mitral valve. No significant valvular regurgitation noted. Left Atrium: Left atrium is mildly dilated. Pericardium: No pericardial effusion. Spoke to pt regarding results and follow up. Patient advised and voices understanding.

## 2024-01-15 ENCOUNTER — OFFICE VISIT (OUTPATIENT)
Dept: INTERNAL MEDICINE CLINIC | Age: 74
End: 2024-01-15
Payer: MEDICARE

## 2024-01-15 VITALS
BODY MASS INDEX: 34.75 KG/M2 | OXYGEN SATURATION: 97 % | HEART RATE: 68 BPM | RESPIRATION RATE: 20 BRPM | WEIGHT: 208.8 LBS | DIASTOLIC BLOOD PRESSURE: 76 MMHG | TEMPERATURE: 97.7 F | SYSTOLIC BLOOD PRESSURE: 120 MMHG

## 2024-01-15 DIAGNOSIS — F32.A DEPRESSION, UNSPECIFIED DEPRESSION TYPE: ICD-10-CM

## 2024-01-15 DIAGNOSIS — C71.9 OLIGOASTROCYTOMA, WHO GRADE 2 (HCC): ICD-10-CM

## 2024-01-15 DIAGNOSIS — H91.93 BILATERAL HEARING LOSS, UNSPECIFIED HEARING LOSS TYPE: ICD-10-CM

## 2024-01-15 DIAGNOSIS — D86.9 SARCOIDOSIS: ICD-10-CM

## 2024-01-15 DIAGNOSIS — E78.2 MIXED HYPERLIPIDEMIA: ICD-10-CM

## 2024-01-15 DIAGNOSIS — E11.9 CONTROLLED TYPE 2 DIABETES MELLITUS WITHOUT COMPLICATION, WITHOUT LONG-TERM CURRENT USE OF INSULIN (HCC): Primary | ICD-10-CM

## 2024-01-15 DIAGNOSIS — Z17.0 CARCINOMA OF AREOLA OF RIGHT BREAST IN FEMALE, ESTROGEN RECEPTOR POSITIVE (HCC): ICD-10-CM

## 2024-01-15 DIAGNOSIS — C50.011 CARCINOMA OF AREOLA OF RIGHT BREAST IN FEMALE, ESTROGEN RECEPTOR POSITIVE (HCC): ICD-10-CM

## 2024-01-15 DIAGNOSIS — R22.41 MASS OF RIGHT THIGH: ICD-10-CM

## 2024-01-15 DIAGNOSIS — J44.9 CHRONIC OBSTRUCTIVE PULMONARY DISEASE, UNSPECIFIED COPD TYPE (HCC): ICD-10-CM

## 2024-01-15 LAB
CREATININE URINE POCT: ABNORMAL
HBA1C MFR BLD: 7 %
MICROALBUMIN/CREAT 24H UR: ABNORMAL MG/G{CREAT}
MICROALBUMIN/CREAT UR-RTO: ABNORMAL

## 2024-01-15 PROCEDURE — G8399 PT W/DXA RESULTS DOCUMENT: HCPCS | Performed by: INTERNAL MEDICINE

## 2024-01-15 PROCEDURE — 83036 HEMOGLOBIN GLYCOSYLATED A1C: CPT | Performed by: INTERNAL MEDICINE

## 2024-01-15 PROCEDURE — 3051F HG A1C>EQUAL 7.0%<8.0%: CPT | Performed by: INTERNAL MEDICINE

## 2024-01-15 PROCEDURE — 3023F SPIROM DOC REV: CPT | Performed by: INTERNAL MEDICINE

## 2024-01-15 PROCEDURE — 1036F TOBACCO NON-USER: CPT | Performed by: INTERNAL MEDICINE

## 2024-01-15 PROCEDURE — 3017F COLORECTAL CA SCREEN DOC REV: CPT | Performed by: INTERNAL MEDICINE

## 2024-01-15 PROCEDURE — 1090F PRES/ABSN URINE INCON ASSESS: CPT | Performed by: INTERNAL MEDICINE

## 2024-01-15 PROCEDURE — 82044 UR ALBUMIN SEMIQUANTITATIVE: CPT | Performed by: INTERNAL MEDICINE

## 2024-01-15 PROCEDURE — 2022F DILAT RTA XM EVC RTNOPTHY: CPT | Performed by: INTERNAL MEDICINE

## 2024-01-15 PROCEDURE — G8427 DOCREV CUR MEDS BY ELIG CLIN: HCPCS | Performed by: INTERNAL MEDICINE

## 2024-01-15 PROCEDURE — G8484 FLU IMMUNIZE NO ADMIN: HCPCS | Performed by: INTERNAL MEDICINE

## 2024-01-15 PROCEDURE — G9899 SCRN MAM PERF RSLTS DOC: HCPCS | Performed by: INTERNAL MEDICINE

## 2024-01-15 PROCEDURE — 36415 COLL VENOUS BLD VENIPUNCTURE: CPT | Performed by: INTERNAL MEDICINE

## 2024-01-15 PROCEDURE — 99214 OFFICE O/P EST MOD 30 MIN: CPT | Performed by: INTERNAL MEDICINE

## 2024-01-15 PROCEDURE — 1123F ACP DISCUSS/DSCN MKR DOCD: CPT | Performed by: INTERNAL MEDICINE

## 2024-01-15 PROCEDURE — G8417 CALC BMI ABV UP PARAM F/U: HCPCS | Performed by: INTERNAL MEDICINE

## 2024-01-15 RX ORDER — METFORMIN HYDROCHLORIDE 500 MG/1
500 TABLET, EXTENDED RELEASE ORAL
Qty: 90 TABLET | Refills: 1 | Status: SHIPPED | OUTPATIENT
Start: 2024-01-15

## 2024-01-15 RX ORDER — ATORVASTATIN CALCIUM 40 MG/1
40 TABLET, FILM COATED ORAL DAILY
Qty: 90 TABLET | Refills: 1 | Status: SHIPPED | OUTPATIENT
Start: 2024-01-15

## 2024-01-15 RX ORDER — EZETIMIBE 10 MG/1
10 TABLET ORAL DAILY
Qty: 90 TABLET | Refills: 1 | Status: SHIPPED | OUTPATIENT
Start: 2024-01-15

## 2024-01-15 ASSESSMENT — PATIENT HEALTH QUESTIONNAIRE - PHQ9
2. FEELING DOWN, DEPRESSED OR HOPELESS: 1
SUM OF ALL RESPONSES TO PHQ QUESTIONS 1-9: 1
1. LITTLE INTEREST OR PLEASURE IN DOING THINGS: 0
SUM OF ALL RESPONSES TO PHQ QUESTIONS 1-9: 1
SUM OF ALL RESPONSES TO PHQ9 QUESTIONS 1 & 2: 1

## 2024-01-15 NOTE — PROGRESS NOTES
Nereida Roberts  Patient's  is 1950  Seen in office on 1/15/2024      SUBJECTIVE:  Nereida gonzales 73 y.o.year old female presents today   Chief Complaint   Patient presents with    Follow-up    Head Congestion     Runny nose    Other     Has seen cardiology    Diabetes     A1c=    Medication Refill     Patient is here for follow-up of hyperlipidemia, depression, diabetes and sarcoidosis  Follow-up also on the right thigh mass.    Runny nose started in cold weather today while coming to office  No fever or chills    Pt has mass on right thigh. Seen Dr Omer. MRI was done and she referred to [facility]  Pt has appt on 24 at [facility]    Seen cardiology : she had SOB and q waves on EKG. He ordered echo and stress test     Sees urologist for kidney stone       Taking medications regularly. No side effects noted.    Review of Systems  Review of system normal except as in HPI  OBJECTIVE: /76   Pulse 68   Temp 97.7 °F (36.5 °C) (Temporal)   Resp 20   Wt 94.7 kg (208 lb 12.8 oz)   SpO2 97%   BMI 34.75 kg/m²     Wt Readings from Last 3 Encounters:   01/15/24 94.7 kg (208 lb 12.8 oz)   23 93.4 kg (206 lb)   23 93.4 kg (206 lb)      GENERAL: - Alert, oriented, pleasant, in no apparent distress.    HEENT: - Conjunctiva pink, no scleral icterus. ENT clear.  NECK: -Supple.  No jugular venous distention noted. No masses felt,  CARDIOVASCULAR: - Normal S1 and S2    PULMONARY: - No respiratory distress.  No wheezes or rales.    ABDOMEN: - Soft and non-tender,no masses  ororganomegaly.  EXTREMITIES: - No cyanosis, clubbing, or significant edema.  SKIN: Skin is warm and dry.   NEUROLOGICAL: - Cranial nerves II through XII are grossly intact.        MRI of right femur on 23  IMPRESSION:  8.9 x 7.3 x 8.3 cm complex partially enhancing mass within the subcutaneous  tissues of the right anterior upper thigh.  A soft tissue sarcoma remains a  consideration, and tissue sampling is recommended.

## 2024-01-16 LAB
CHOLEST SERPL-MCNC: 159 MG/DL (ref 0–199)
HDLC SERPL-MCNC: 55 MG/DL (ref 40–60)
LDL CHOLESTEROL CALCULATED: 76 MG/DL
TRIGL SERPL-MCNC: 139 MG/DL (ref 0–150)
VLDLC SERPL CALC-MCNC: 28 MG/DL

## 2024-01-20 PROBLEM — R06.02 SOB (SHORTNESS OF BREATH) ON EXERTION: Status: RESOLVED | Noted: 2023-12-19 | Resolved: 2024-01-20

## 2024-01-22 ENCOUNTER — OFFICE VISIT (OUTPATIENT)
Dept: CARDIOLOGY CLINIC | Age: 74
End: 2024-01-22

## 2024-01-22 VITALS
HEIGHT: 65 IN | OXYGEN SATURATION: 98 % | BODY MASS INDEX: 35.06 KG/M2 | SYSTOLIC BLOOD PRESSURE: 136 MMHG | DIASTOLIC BLOOD PRESSURE: 88 MMHG | HEART RATE: 82 BPM | RESPIRATION RATE: 18 BRPM | WEIGHT: 210.4 LBS

## 2024-01-22 DIAGNOSIS — Z82.49 FAMILY HISTORY OF CORONARY ARTERY DISEASE IN MOTHER: ICD-10-CM

## 2024-01-22 DIAGNOSIS — R94.31 ABNORMAL EKG: ICD-10-CM

## 2024-01-22 DIAGNOSIS — D86.9 SARCOIDOSIS: Primary | ICD-10-CM

## 2024-01-22 DIAGNOSIS — F32.A DEPRESSION, UNSPECIFIED DEPRESSION TYPE: ICD-10-CM

## 2024-01-22 DIAGNOSIS — E11.9 CONTROLLED TYPE 2 DIABETES MELLITUS WITHOUT COMPLICATION, WITHOUT LONG-TERM CURRENT USE OF INSULIN (HCC): ICD-10-CM

## 2024-01-22 DIAGNOSIS — E78.2 MIXED HYPERLIPIDEMIA: ICD-10-CM

## 2024-01-22 DIAGNOSIS — R07.2 PRECORDIAL PAIN: ICD-10-CM

## 2024-01-22 NOTE — PROGRESS NOTES
stridor,no apical-carotid delay  Eyes:  PERRL, EOMI, Conjunctiva normal, No discharge.   Respiratory:  Normal breath sounds, No respiratory distress, No wheezing, No chest tenderness.,no use of accessory muscles, NO crackles  Cardiovascular: (PMI) apex non displaced,no lifts no thrills,S1 and S2 audible, No added heart sounds, No signs of ankle edema, or volume overload, No evidence of JVD, No crackles   GI:  Bowel sounds normal, Soft, No tenderness, No masses, No gross visceromegaly   :  No costovertebral angle tenderness   Musculoskeletal:  No edema, no tenderness, no deformities. Back- no tenderness  Integument:  Well hydrated, no rash   Lymphatic:  No lymphadenopathy noted   Neurologic:  Alert & oriented x 3, CN 2-12 normal, normal motor function, normal sensory function, no focal deficits noted   Psychiatric:  Speech and behavior appropriate       Medical decision making and Data review:  DATA:  Lab Results   Component Value Date    TROPONINT <0.010 04/21/2020     BNP:  No results found for: \"PROBNP\"  PT/INR:  No results found for: \"PTINR\"  Lab Results   Component Value Date    LABA1C 7.0 01/15/2024    LABA1C 6.3 07/31/2023     Lab Results   Component Value Date    CHOL 258 (H) 07/31/2023    TRIG 331 (H) 07/31/2023    HDL 55 01/15/2024    LDLCALC 76 01/15/2024    LDLDIRECT 108 (H) 10/29/2021     Lab Results   Component Value Date    ALT 39 11/06/2023    AST 23 11/06/2023     No results for input(s): \"WBC\", \"HGB\", \"HCT\", \"MCV\", \"PLT\" in the last 72 hours.  TSH: No results found for: \"TSH\"  Lab Results   Component Value Date    AST 23 11/06/2023    ALT 39 11/06/2023    BILIDIR 0.2 02/07/2020    BILITOT 0.3 11/06/2023    ALKPHOS 164 (H) 11/06/2023     No results found for: \"PROBNP\"  Lab Results   Component Value Date    LABA1C 7.0 01/15/2024    LABA1C 6.3 07/31/2023     Lab Results   Component Value Date    WBC 6.6 11/10/2023    HGB 13.9 11/10/2023    HCT 44.8 11/10/2023     11/10/2023     Stress test

## 2024-01-26 RX ORDER — LETROZOLE 2.5 MG/1
2.5 TABLET, FILM COATED ORAL DAILY
Qty: 30 TABLET | Refills: 2 | Status: SHIPPED | OUTPATIENT
Start: 2024-01-26

## 2024-01-26 NOTE — TELEPHONE ENCOUNTER
Patient contacted our office in need of refill for letrozole. Patient has a scheduled appointment on 4/11. Patients preferred pharmacy is [facility]. Pending for patients chart provider Chris Reynolds.

## 2024-03-08 ENCOUNTER — HOSPITAL ENCOUNTER (OUTPATIENT)
Dept: GENERAL RADIOLOGY | Age: 74
Discharge: HOME | End: 2024-03-08
Attending: UROLOGY
Payer: MEDICARE

## 2024-03-08 ENCOUNTER — HOSPITAL ENCOUNTER (OUTPATIENT)
Age: 74
Discharge: HOME | End: 2024-03-08
Payer: MEDICARE

## 2024-03-08 DIAGNOSIS — N20.0 CALCULUS OF KIDNEY: ICD-10-CM

## 2024-03-08 PROCEDURE — 74018 RADEX ABDOMEN 1 VIEW: CPT

## 2024-04-11 ENCOUNTER — HOSPITAL ENCOUNTER (OUTPATIENT)
Dept: INFUSION THERAPY | Age: 74
Discharge: HOME | End: 2024-04-11
Payer: MEDICARE

## 2024-04-11 ENCOUNTER — OFFICE VISIT (OUTPATIENT)
Dept: ONCOLOGY | Age: 74
End: 2024-04-11
Payer: MEDICARE

## 2024-04-11 VITALS
DIASTOLIC BLOOD PRESSURE: 73 MMHG | HEART RATE: 94 BPM | HEIGHT: 65 IN | OXYGEN SATURATION: 97 % | BODY MASS INDEX: 35.29 KG/M2 | SYSTOLIC BLOOD PRESSURE: 154 MMHG | RESPIRATION RATE: 20 BRPM | TEMPERATURE: 97.7 F | WEIGHT: 211.8 LBS

## 2024-04-11 DIAGNOSIS — Z17.0 CARCINOMA OF AREOLA OF RIGHT BREAST IN FEMALE, ESTROGEN RECEPTOR POSITIVE (HCC): Primary | ICD-10-CM

## 2024-04-11 DIAGNOSIS — C50.011 CARCINOMA OF AREOLA OF RIGHT BREAST IN FEMALE, ESTROGEN RECEPTOR POSITIVE (HCC): Primary | ICD-10-CM

## 2024-04-11 DIAGNOSIS — C71.9 OLIGOASTROCYTOMA, WHO GRADE 2 (HCC): ICD-10-CM

## 2024-04-11 DIAGNOSIS — C49.9 PRIMARY MYXOFIBROSARCOMA (HCC): ICD-10-CM

## 2024-04-11 PROCEDURE — 1090F PRES/ABSN URINE INCON ASSESS: CPT | Performed by: INTERNAL MEDICINE

## 2024-04-11 PROCEDURE — G8417 CALC BMI ABV UP PARAM F/U: HCPCS | Performed by: INTERNAL MEDICINE

## 2024-04-11 PROCEDURE — 1123F ACP DISCUSS/DSCN MKR DOCD: CPT | Performed by: INTERNAL MEDICINE

## 2024-04-11 PROCEDURE — 99211 OFF/OP EST MAY X REQ PHY/QHP: CPT

## 2024-04-11 PROCEDURE — 99214 OFFICE O/P EST MOD 30 MIN: CPT | Performed by: INTERNAL MEDICINE

## 2024-04-11 PROCEDURE — 1036F TOBACCO NON-USER: CPT | Performed by: INTERNAL MEDICINE

## 2024-04-11 PROCEDURE — G8399 PT W/DXA RESULTS DOCUMENT: HCPCS | Performed by: INTERNAL MEDICINE

## 2024-04-11 PROCEDURE — G8427 DOCREV CUR MEDS BY ELIG CLIN: HCPCS | Performed by: INTERNAL MEDICINE

## 2024-04-11 PROCEDURE — 3017F COLORECTAL CA SCREEN DOC REV: CPT | Performed by: INTERNAL MEDICINE

## 2024-04-11 NOTE — PROGRESS NOTES
MA Rooming Questions  Patient: Nereida Roberts  MRN: [id number]    Date: 4/11/2024        1. Do you have any new issues?   no         2. Do you need any refills on medications?    no    3. Have you had any imaging done since your last visit?   yes - scans at [facility]    4. Have you been hospitalized or seen in the emergency room since your last visit here?   no    5. Did the patient have a depression screening completed today? No    No data recorded     PHQ-9 Given to (if applicable):               PHQ-9 Score (if applicable):                     [] Positive     []  Negative              Does question #9 need addressed (if applicable)                     [] Yes    []  No               Carolyn Duckworth MA

## 2024-04-11 NOTE — PROGRESS NOTES
Patient Name:  Nereida Roberts  Patient :  1950  Patient MRN:  [id number]     Primary Oncologist: Chris Reynolds MD  Referring Provider: Jordan Pickering MD     Date of Service: 2024      Chief Complaint:    Chief Complaint   Patient presents with    Follow-up     Patient Active Problem List:     Mixed hyperlipidemia     Sarcoidosis     Depression     Hearing loss     Oligoastrocytoma, WHO grade 2 (HCC)     Liver dysfunction     Cyst of left kidney     Cyst of pancreas     Chronic obstructive pulmonary disease (HCC)     Abnormal mammogram     Controlled type 2 diabetes mellitus without complication, without long-term current use of insulin (HCC)     Carcinoma of areola of right breast in female, estrogen receptor positive (HCC)     Family history of breast cancer in first degree relative    HPI:   Nereida Roberts is a 73 year-old very pleasant female with medical history significant for hyperlipidemia, diabetes mellitus, history of oligo astrocytoma, status post surgery followed by adjuvant chemoradiation, depression/anxiety, kidney stones and sarcoidosis, referred to me on 2023 for 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    She had a screening mammogram on 2022 which showed increasing size of 1.2 cm ovoid right retroareolar breast mass and further evaluation was recommended.  Targeted ultrasound of the retroareolar region was done on 2022 and showed an 11 x 6 x 11 mm hypoechoic mass with slightly indistinct margins.  This correlated to the area of interest on the mammogram.     She underwent needle core biopsy of the mass on 10/13/2022.  Pathology revealed mucinous carcinoma G2, ER >95%, [place] 90% Her2 unamplified by IHC & FISH, ki67 10-15%.  She then underwent lumpectomy and sentinel lymph node biopsy on 12/15/2022.  Pathology revealed mucinous carcinoma, 1.8 x 0.8 x 0.2 cm, grade 2, DCIS not present, margins negative, 0/1 sentinel nodes, making her final surgical pathology

## 2024-04-15 RX ORDER — LETROZOLE 2.5 MG/1
2.5 TABLET, FILM COATED ORAL DAILY
Qty: 30 TABLET | Refills: 3 | Status: SHIPPED | OUTPATIENT
Start: 2024-04-15

## 2024-04-16 RX ORDER — EZETIMIBE 10 MG/1
10 TABLET ORAL DAILY
Qty: 90 TABLET | Refills: 1 | Status: SHIPPED | OUTPATIENT
Start: 2024-04-16

## 2024-04-17 ENCOUNTER — TELEPHONE (OUTPATIENT)
Dept: CARDIOLOGY CLINIC | Age: 74
End: 2024-04-17

## 2024-05-01 ENCOUNTER — OFFICE VISIT (OUTPATIENT)
Age: 74
End: 2024-05-01
Payer: MEDICARE

## 2024-05-01 VITALS
SYSTOLIC BLOOD PRESSURE: 136 MMHG | BODY MASS INDEX: 35.18 KG/M2 | DIASTOLIC BLOOD PRESSURE: 80 MMHG | WEIGHT: 211.4 LBS | HEART RATE: 88 BPM | RESPIRATION RATE: 16 BRPM | TEMPERATURE: 97 F | OXYGEN SATURATION: 96 %

## 2024-05-01 DIAGNOSIS — Z17.0 CARCINOMA OF AREOLA OF RIGHT BREAST IN FEMALE, ESTROGEN RECEPTOR POSITIVE (HCC): ICD-10-CM

## 2024-05-01 DIAGNOSIS — E11.9 CONTROLLED TYPE 2 DIABETES MELLITUS WITHOUT COMPLICATION, WITHOUT LONG-TERM CURRENT USE OF INSULIN (HCC): ICD-10-CM

## 2024-05-01 DIAGNOSIS — H91.93 BILATERAL HEARING LOSS, UNSPECIFIED HEARING LOSS TYPE: ICD-10-CM

## 2024-05-01 DIAGNOSIS — C71.9 OLIGOASTROCYTOMA, WHO GRADE 2 (HCC): ICD-10-CM

## 2024-05-01 DIAGNOSIS — R22.41 MASS OF RIGHT THIGH: ICD-10-CM

## 2024-05-01 DIAGNOSIS — C50.011 CARCINOMA OF AREOLA OF RIGHT BREAST IN FEMALE, ESTROGEN RECEPTOR POSITIVE (HCC): ICD-10-CM

## 2024-05-01 DIAGNOSIS — F32.A DEPRESSION, UNSPECIFIED DEPRESSION TYPE: ICD-10-CM

## 2024-05-01 DIAGNOSIS — J44.9 CHRONIC OBSTRUCTIVE PULMONARY DISEASE, UNSPECIFIED COPD TYPE (HCC): ICD-10-CM

## 2024-05-01 DIAGNOSIS — E78.2 MIXED HYPERLIPIDEMIA: Primary | ICD-10-CM

## 2024-05-01 DIAGNOSIS — D86.9 SARCOIDOSIS: ICD-10-CM

## 2024-05-01 PROBLEM — C49.9 SARCOMA (HCC): Status: ACTIVE | Noted: 2023-11-06

## 2024-05-01 PROCEDURE — 2022F DILAT RTA XM EVC RTNOPTHY: CPT | Performed by: INTERNAL MEDICINE

## 2024-05-01 PROCEDURE — G8417 CALC BMI ABV UP PARAM F/U: HCPCS | Performed by: INTERNAL MEDICINE

## 2024-05-01 PROCEDURE — 1123F ACP DISCUSS/DSCN MKR DOCD: CPT | Performed by: INTERNAL MEDICINE

## 2024-05-01 PROCEDURE — 3051F HG A1C>EQUAL 7.0%<8.0%: CPT | Performed by: INTERNAL MEDICINE

## 2024-05-01 PROCEDURE — 1090F PRES/ABSN URINE INCON ASSESS: CPT | Performed by: INTERNAL MEDICINE

## 2024-05-01 PROCEDURE — 3017F COLORECTAL CA SCREEN DOC REV: CPT | Performed by: INTERNAL MEDICINE

## 2024-05-01 PROCEDURE — 99214 OFFICE O/P EST MOD 30 MIN: CPT | Performed by: INTERNAL MEDICINE

## 2024-05-01 PROCEDURE — 3023F SPIROM DOC REV: CPT | Performed by: INTERNAL MEDICINE

## 2024-05-01 PROCEDURE — G8427 DOCREV CUR MEDS BY ELIG CLIN: HCPCS | Performed by: INTERNAL MEDICINE

## 2024-05-01 PROCEDURE — G8399 PT W/DXA RESULTS DOCUMENT: HCPCS | Performed by: INTERNAL MEDICINE

## 2024-05-01 PROCEDURE — 1036F TOBACCO NON-USER: CPT | Performed by: INTERNAL MEDICINE

## 2024-05-01 ASSESSMENT — PATIENT HEALTH QUESTIONNAIRE - PHQ9
1. LITTLE INTEREST OR PLEASURE IN DOING THINGS: SEVERAL DAYS
SUM OF ALL RESPONSES TO PHQ QUESTIONS 1-9: 2
SUM OF ALL RESPONSES TO PHQ QUESTIONS 1-9: 2
2. FEELING DOWN, DEPRESSED OR HOPELESS: SEVERAL DAYS
SUM OF ALL RESPONSES TO PHQ9 QUESTIONS 1 & 2: 2
SUM OF ALL RESPONSES TO PHQ QUESTIONS 1-9: 2
SUM OF ALL RESPONSES TO PHQ QUESTIONS 1-9: 2

## 2024-05-01 NOTE — PROGRESS NOTES
Tobacco Use    Smoking status: Former     Current packs/day: 0.00     Average packs/day: 0.8 packs/day for 20.0 years (15.0 ttl pk-yrs)     Types: Cigarettes     Start date: 1983     Quit date: 2003     Years since quittin.7    Smokeless tobacco: Never   Substance Use Topics    Alcohol use: No     Alcohol/week: 0.0 standard drinks of alcohol       LAB REVIEW:  CBC:   Lab Results   Component Value Date/Time    WBC 6.6 11/10/2023 10:40 AM    HGB 13.9 11/10/2023 10:40 AM    HCT 44.8 11/10/2023 10:40 AM     11/10/2023 10:40 AM     Lipids:   Lab Results   Component Value Date    HDL 55 01/15/2024    LDLDIRECT 108 (H) 10/29/2021    TRIGLYCFAST 139 01/15/2024    CHOLFAST 159 01/15/2024     Renal:   Lab Results   Component Value Date/Time    BUN 18 2023 02:24 PM    CREATININE 0.9 2023 02:24 PM     2023 02:24 PM    K 4.6 2023 02:24 PM    ALKPHOS 164 2023 02:24 PM    ALT 39 2023 02:24 PM    AST 23 2023 02:24 PM    GLUCOSE 174 2023 02:24 PM    GLUF 131 2022 11:00 AM     PT/INR:   Lab Results   Component Value Date/Time    INR 1.1 2023 12:40 PM     A1C:   Lab Results   Component Value Date    LABA1C 7.0 01/15/2024           Jordan Pickering MD, 2024 , 11:03 AM

## 2024-05-13 RX ORDER — METFORMIN HYDROCHLORIDE 500 MG/1
500 TABLET, EXTENDED RELEASE ORAL
Qty: 90 TABLET | Refills: 1 | Status: SHIPPED | OUTPATIENT
Start: 2024-05-13

## 2024-08-02 RX ORDER — EZETIMIBE 10 MG/1
10 TABLET ORAL DAILY
Qty: 90 TABLET | Refills: 1 | Status: SHIPPED | OUTPATIENT
Start: 2024-08-02

## 2024-08-08 ENCOUNTER — OFFICE VISIT (OUTPATIENT)
Age: 74
End: 2024-08-08

## 2024-08-08 VITALS
HEART RATE: 107 BPM | RESPIRATION RATE: 16 BRPM | DIASTOLIC BLOOD PRESSURE: 86 MMHG | OXYGEN SATURATION: 97 % | SYSTOLIC BLOOD PRESSURE: 138 MMHG | WEIGHT: 204 LBS | HEIGHT: 65 IN | BODY MASS INDEX: 33.99 KG/M2

## 2024-08-08 DIAGNOSIS — C50.011 CARCINOMA OF AREOLA OF RIGHT BREAST IN FEMALE, ESTROGEN RECEPTOR POSITIVE (HCC): ICD-10-CM

## 2024-08-08 DIAGNOSIS — E78.2 MIXED HYPERLIPIDEMIA: ICD-10-CM

## 2024-08-08 DIAGNOSIS — F32.A DEPRESSION, UNSPECIFIED DEPRESSION TYPE: ICD-10-CM

## 2024-08-08 DIAGNOSIS — D86.9 SARCOIDOSIS: ICD-10-CM

## 2024-08-08 DIAGNOSIS — E11.9 CONTROLLED TYPE 2 DIABETES MELLITUS WITHOUT COMPLICATION, WITHOUT LONG-TERM CURRENT USE OF INSULIN (HCC): ICD-10-CM

## 2024-08-08 DIAGNOSIS — H91.93 BILATERAL HEARING LOSS, UNSPECIFIED HEARING LOSS TYPE: ICD-10-CM

## 2024-08-08 DIAGNOSIS — Z17.0 CARCINOMA OF AREOLA OF RIGHT BREAST IN FEMALE, ESTROGEN RECEPTOR POSITIVE (HCC): ICD-10-CM

## 2024-08-08 DIAGNOSIS — C44.99 MYXOFIBROSARCOMA OF SKIN: Primary | ICD-10-CM

## 2024-08-08 DIAGNOSIS — C71.9 OLIGOASTROCYTOMA, WHO GRADE 2 (HCC): ICD-10-CM

## 2024-08-08 DIAGNOSIS — C49.9 PRIMARY MYXOFIBROSARCOMA (HCC): ICD-10-CM

## 2024-08-08 DIAGNOSIS — J44.9 CHRONIC OBSTRUCTIVE PULMONARY DISEASE, UNSPECIFIED COPD TYPE (HCC): ICD-10-CM

## 2024-08-08 PROBLEM — R07.9 CHEST PAIN: Status: RESOLVED | Noted: 2023-12-28 | Resolved: 2024-08-08

## 2024-08-08 RX ORDER — ATORVASTATIN CALCIUM 40 MG/1
40 TABLET, FILM COATED ORAL DAILY
Qty: 90 TABLET | Refills: 1 | Status: SHIPPED | OUTPATIENT
Start: 2024-08-08

## 2024-08-08 RX ORDER — ESZOPICLONE 3 MG/1
TABLET, FILM COATED ORAL
COMMUNITY
Start: 2024-08-01

## 2024-08-08 SDOH — ECONOMIC STABILITY: INCOME INSECURITY: HOW HARD IS IT FOR YOU TO PAY FOR THE VERY BASICS LIKE FOOD, HOUSING, MEDICAL CARE, AND HEATING?: NOT VERY HARD

## 2024-08-08 SDOH — ECONOMIC STABILITY: FOOD INSECURITY: WITHIN THE PAST 12 MONTHS, YOU WORRIED THAT YOUR FOOD WOULD RUN OUT BEFORE YOU GOT MONEY TO BUY MORE.: NEVER TRUE

## 2024-08-08 SDOH — ECONOMIC STABILITY: FOOD INSECURITY: WITHIN THE PAST 12 MONTHS, THE FOOD YOU BOUGHT JUST DIDN'T LAST AND YOU DIDN'T HAVE MONEY TO GET MORE.: NEVER TRUE

## 2024-08-08 NOTE — PROGRESS NOTES
CHOLECYSTECTOMY  2006    COLONOSCOPY      f/u 5 years- Dr Valentine    CRANIOTOMY  2013    resection of brain tumor Dr. Bolanos    HYSTERECTOMY (CERVIX STATUS UNKNOWN)      IR NEPHROSTOMY PERCUTANEOUS RIGHT  2023    IR NEPHROSTOMY PERCUTANEOUS RIGHT 2023 [facility] SPECIAL PROCEDURES    LITHOTRIPSY  2005    LITHOTRIPSY Right 2023    CYSTOSCOPY RIGHT URETERSOCOPY  RETROGRADE PYELOGRAM STONE MANIPULATION HOLIUM LASER LITHOTRIPSY STONE EXTRACTION WITH BASKET STENT PLACEMENT AND REMOVAL OF NEPHROSTOMY TUBE performed by Tae Dean MD at [facility] OR    LITHOTRIPSY  11/15/2023    LITHOTRIPSY Right 11/15/2023    RIGHT CYSTOSCOPY URETEROSCOPY RETROGRADE PYELOGRAM STONE MANIPULATION HOLIUM LASER LITHOTRIPSY  STENT PLACEMENT performed by Derek Haynes MD at [facility] OR    MAMMO IMPLANT DIGITAL DIAG BI      Dr Larios    OVARY REMOVAL      SKIN BIOPSY      TONSILLECTOMY      US BREAST BIOPSY W LOC DEVICE 1ST LESION RIGHT Right 10/13/2022    US BREAST NEEDLE BIOPSY RIGHT 10/13/2022 [facility] ULTRASOUND    US GUIDED NEEDLE LOC OF RIGHT BREAST Right 12/15/2022    US GUIDED NEEDLE LOC OF RIGHT BREAST [facility] ULTRASOUND     Social History     Tobacco Use    Smoking status: Former     Current packs/day: 0.00     Average packs/day: 0.8 packs/day for 20.0 years (15.0 ttl pk-yrs)     Types: Cigarettes     Start date: 1983     Quit date: 2003     Years since quittin.9    Smokeless tobacco: Never   Substance Use Topics    Alcohol use: No     Alcohol/week: 0.0 standard drinks of alcohol       LAB REVIEW:  CBC:   Lab Results   Component Value Date/Time    WBC 6.6 11/10/2023 10:40 AM    HGB 13.9 11/10/2023 10:40 AM    HCT 44.8 11/10/2023 10:40 AM     11/10/2023 10:40 AM     Lipids:   Lab Results   Component Value Date    HDL 55 01/15/2024    LDLDIRECT 108 (H) 10/29/2021    TRIGLYCFAST 139 01/15/2024    CHOLFAST 159 01/15/2024     Renal:   Lab Results   Component Value Date/Time    BUN 18 2023 02:24 PM

## 2024-08-12 ENCOUNTER — HOSPITAL ENCOUNTER (OUTPATIENT)
Age: 74
Discharge: HOME | End: 2024-08-12
Payer: MEDICARE

## 2024-08-12 DIAGNOSIS — E11.9 CONTROLLED TYPE 2 DIABETES MELLITUS WITHOUT COMPLICATION, WITHOUT LONG-TERM CURRENT USE OF INSULIN (HCC): ICD-10-CM

## 2024-08-12 DIAGNOSIS — E78.2 MIXED HYPERLIPIDEMIA: ICD-10-CM

## 2024-08-12 LAB
ALBUMIN SERPL-MCNC: 4.1 GM/DL (ref 3.4–5)
ALP BLD-CCNC: 152 IU/L (ref 40–129)
ALT SERPL-CCNC: 21 U/L (ref 10–40)
ANION GAP SERPL CALCULATED.3IONS-SCNC: 15 MMOL/L (ref 7–16)
AST SERPL-CCNC: 15 IU/L (ref 15–37)
BASOPHILS ABSOLUTE: 0 K/CU MM
BASOPHILS RELATIVE PERCENT: 0.6 % (ref 0–1)
BILIRUB SERPL-MCNC: 0.4 MG/DL (ref 0–1)
BUN SERPL-MCNC: 20 MG/DL (ref 6–23)
CALCIUM SERPL-MCNC: 9.5 MG/DL (ref 8.3–10.6)
CHLORIDE BLD-SCNC: 102 MMOL/L (ref 99–110)
CHOLESTEROL, FASTING: 192 MG/DL
CO2: 22 MMOL/L (ref 21–32)
CREAT SERPL-MCNC: 0.9 MG/DL (ref 0.6–1.1)
DIFFERENTIAL TYPE: ABNORMAL
EOSINOPHILS ABSOLUTE: 0.2 K/CU MM
EOSINOPHILS RELATIVE PERCENT: 3.5 % (ref 0–3)
ESTIMATED AVERAGE GLUCOSE: 131 MG/DL
GFR, ESTIMATED: 67 ML/MIN/1.73M2
GLUCOSE SERPL-MCNC: 119 MG/DL (ref 70–99)
HBA1C MFR BLD: 6.2 % (ref 4.2–6.3)
HCT VFR BLD CALC: 44.3 % (ref 37–47)
HDLC SERPL-MCNC: 54 MG/DL
HEMOGLOBIN: 14.1 GM/DL (ref 12.5–16)
IMMATURE NEUTROPHIL %: 0.6 % (ref 0–0.43)
LDLC SERPL CALC-MCNC: 101 MG/DL
LYMPHOCYTES ABSOLUTE: 1 K/CU MM
LYMPHOCYTES RELATIVE PERCENT: 14.8 % (ref 24–44)
MCH RBC QN AUTO: 27.1 PG (ref 27–31)
MCHC RBC AUTO-ENTMCNC: 31.8 % (ref 32–36)
MCV RBC AUTO: 85.2 FL (ref 78–100)
MONOCYTES ABSOLUTE: 0.4 K/CU MM
MONOCYTES RELATIVE PERCENT: 6.7 % (ref 0–4)
NEUTROPHILS ABSOLUTE: 4.8 K/CU MM
NEUTROPHILS RELATIVE PERCENT: 73.8 % (ref 36–66)
PDW BLD-RTO: 13.4 % (ref 11.7–14.9)
PLATELET # BLD: 281 K/CU MM (ref 140–440)
PMV BLD AUTO: 9.7 FL (ref 7.5–11.1)
POTASSIUM SERPL-SCNC: 4.3 MMOL/L (ref 3.5–5.1)
RBC # BLD: 5.2 M/CU MM (ref 4.2–5.4)
SODIUM BLD-SCNC: 139 MMOL/L (ref 135–145)
TOTAL IMMATURE NEUTOROPHIL: 0.04 K/CU MM
TOTAL PROTEIN: 7.1 GM/DL (ref 6.4–8.2)
TRIGLYCERIDE, FASTING: 185 MG/DL
WBC # BLD: 6.6 K/CU MM (ref 4–10.5)

## 2024-08-12 PROCEDURE — 36415 COLL VENOUS BLD VENIPUNCTURE: CPT

## 2024-08-12 PROCEDURE — 85025 COMPLETE CBC W/AUTO DIFF WBC: CPT

## 2024-08-12 PROCEDURE — 83036 HEMOGLOBIN GLYCOSYLATED A1C: CPT

## 2024-08-12 PROCEDURE — 80053 COMPREHEN METABOLIC PANEL: CPT

## 2024-08-12 PROCEDURE — 80061 LIPID PANEL: CPT

## 2024-08-13 RX ORDER — METFORMIN HYDROCHLORIDE 500 MG/1
500 TABLET, EXTENDED RELEASE ORAL
Qty: 90 TABLET | Refills: 1 | Status: SHIPPED | OUTPATIENT
Start: 2024-08-13

## 2024-08-19 NOTE — PROGRESS NOTES
Rhonda Kent MD, 8/2/2023 , 1:37 PM PAST MEDICAL HISTORY:  Maternal care for low transverse scar from previous  delivery

## 2024-08-28 ENCOUNTER — TELEPHONE (OUTPATIENT)
Dept: ONCOLOGY | Age: 74
End: 2024-08-28

## 2024-08-28 NOTE — TELEPHONE ENCOUNTER
8/28/24 - spoke w/ pt for the 9/18/24 Mammo at [facility] arrival time of 1:35 pm no deodorants/powders to be worn.

## 2024-09-03 RX ORDER — LETROZOLE 2.5 MG/1
2.5 TABLET, FILM COATED ORAL DAILY
Qty: 90 TABLET | Refills: 0 | Status: SHIPPED | OUTPATIENT
Start: 2024-09-03

## 2024-09-13 ENCOUNTER — HOSPITAL ENCOUNTER (OUTPATIENT)
Dept: GENERAL RADIOLOGY | Age: 74
Discharge: HOME | End: 2024-09-13
Payer: MEDICARE

## 2024-09-13 ENCOUNTER — HOSPITAL ENCOUNTER (OUTPATIENT)
Age: 74
Discharge: HOME | End: 2024-09-13
Payer: MEDICARE

## 2024-09-13 DIAGNOSIS — N20.0 CALCULUS OF KIDNEY: ICD-10-CM

## 2024-09-13 PROCEDURE — 74018 RADEX ABDOMEN 1 VIEW: CPT

## 2024-09-18 ENCOUNTER — APPOINTMENT (OUTPATIENT)
Dept: ULTRASOUND IMAGING | Age: 74
End: 2024-09-18
Attending: INTERNAL MEDICINE
Payer: MEDICARE

## 2024-09-18 ENCOUNTER — HOSPITAL ENCOUNTER (OUTPATIENT)
Dept: WOMENS IMAGING | Age: 74
Discharge: HOME | End: 2024-09-18
Attending: INTERNAL MEDICINE
Payer: MEDICARE

## 2024-09-18 DIAGNOSIS — C71.9 OLIGOASTROCYTOMA, WHO GRADE 2 (HCC): ICD-10-CM

## 2024-09-18 DIAGNOSIS — Z17.0 CARCINOMA OF AREOLA OF RIGHT BREAST IN FEMALE, ESTROGEN RECEPTOR POSITIVE (HCC): ICD-10-CM

## 2024-09-18 DIAGNOSIS — C50.011 CARCINOMA OF AREOLA OF RIGHT BREAST IN FEMALE, ESTROGEN RECEPTOR POSITIVE (HCC): ICD-10-CM

## 2024-09-18 PROCEDURE — 77066 DX MAMMO INCL CAD BI: CPT

## 2024-10-10 ENCOUNTER — OFFICE VISIT (OUTPATIENT)
Dept: ONCOLOGY | Age: 74
End: 2024-10-10

## 2024-10-10 ENCOUNTER — HOSPITAL ENCOUNTER (OUTPATIENT)
Dept: INFUSION THERAPY | Age: 74
Discharge: HOME | End: 2024-10-10
Payer: MEDICARE

## 2024-10-10 VITALS
SYSTOLIC BLOOD PRESSURE: 157 MMHG | TEMPERATURE: 97.7 F | OXYGEN SATURATION: 97 % | BODY MASS INDEX: 34.42 KG/M2 | DIASTOLIC BLOOD PRESSURE: 73 MMHG | HEART RATE: 74 BPM | HEIGHT: 65 IN | WEIGHT: 206.6 LBS

## 2024-10-10 DIAGNOSIS — C50.011 CARCINOMA OF AREOLA OF RIGHT BREAST IN FEMALE, ESTROGEN RECEPTOR POSITIVE (HCC): Primary | ICD-10-CM

## 2024-10-10 DIAGNOSIS — C49.9 PRIMARY MYXOFIBROSARCOMA (HCC): ICD-10-CM

## 2024-10-10 DIAGNOSIS — Z17.0 CARCINOMA OF AREOLA OF RIGHT BREAST IN FEMALE, ESTROGEN RECEPTOR POSITIVE (HCC): Primary | ICD-10-CM

## 2024-10-10 DIAGNOSIS — Z79.811 LONG TERM (CURRENT) USE OF AROMATASE INHIBITORS: ICD-10-CM

## 2024-10-10 PROCEDURE — 99211 OFF/OP EST MAY X REQ PHY/QHP: CPT

## 2024-10-10 RX ORDER — LETROZOLE 2.5 MG/1
2.5 TABLET, FILM COATED ORAL DAILY
Qty: 90 TABLET | Refills: 0 | Status: SHIPPED | OUTPATIENT
Start: 2024-10-10

## 2024-10-10 ASSESSMENT — PATIENT HEALTH QUESTIONNAIRE - PHQ9
SUM OF ALL RESPONSES TO PHQ QUESTIONS 1-9: 0
SUM OF ALL RESPONSES TO PHQ QUESTIONS 1-9: 0
1. LITTLE INTEREST OR PLEASURE IN DOING THINGS: NOT AT ALL
SUM OF ALL RESPONSES TO PHQ QUESTIONS 1-9: 0
2. FEELING DOWN, DEPRESSED OR HOPELESS: NOT AT ALL
SUM OF ALL RESPONSES TO PHQ9 QUESTIONS 1 & 2: 0
SUM OF ALL RESPONSES TO PHQ QUESTIONS 1-9: 0

## 2024-10-10 NOTE — PROGRESS NOTES
MA Rooming Questions  Patient: Nereida Roberts  MRN: [id number]    Date: 10/10/2024        1. Do you have any new issues?   no         2. Do you need any refills on medications?    yes - letrozole     3. Have you had any imaging done since your last visit?   yes - maricel 9/8    4. Have you been hospitalized or seen in the emergency room since your last visit here?   no    5. Did the patient have a depression screening completed today? Yes    PHQ-9 Total Score: 0 (10/10/2024 11:31 AM)       PHQ-9 Given to (if applicable):               PHQ-9 Score (if applicable):                     [] Positive     []  Negative              Does question #9 need addressed (if applicable)                     [] Yes    []  No               Flores Saha CMA      
\"HOMOCYSTEINE\"  No results found for: \"LD\"  Lab Results   Component Value Date    TSHHS 1.500 10/29/2021     Immunology:  No results found for: \"SPEP\", \"ALBUMINELP\", \"LABALPH\", \"LABBETA\", \"GAMGLOB\"    No results found for: \"KAPPAUVOL\", \"LAMBDAUVOL\", \"KLFLCR\"  No results found for: \"B2M\"  Coagulation Panel:  Lab Results   Component Value Date    PROTIME 14.6 (H) 08/11/2023    INR 1.1 08/11/2023    APTT 20.5 (L) 10/14/2012     Anemia Panel:  No results found for: \"GUPQDYSB40\", \"FOLATE\"  Tumor Markers:  No results found for: \"\", \"CEA\", \"\", \"LABCA2\", \"PSA\"     Observations:  PHQ-9 Total Score: 0 (10/10/2024 11:31 AM)    Assessment   Right breast invasive mucinous carcinoma    Plan:  Nereida Roberts is a 72 y.o. female with R breast invasive mucinous carcinoma, pT1c, pN0(sn), cM0, G1, ER/NE positive, Her2 unamplified, Ki67 10-15%, status post lumpectomy and sentinel lymph node biopsy who presents to discuss about adjuvant systemic therapy options.    She was seen by Dr. Schaeffer and she received APBI between 2/21/23 and 2/2723.     Since she only has stage IA invasive mucinous carcinoma (which is favorable histology), she doesn't need adjuvant chemotherapy. I recommend her to consider for adjuvant endocrine therapy with aromatase inhibitor, letrozole.     After long discussion, she is in agreement to start adjuvant endocrine therapy. Letrozole was started on 2/1/23.     DEXA scan done on 11/2/2022 showed osteopenia by WHO criteria.    B/L mammogram done on 9/19/23 showed expected postsurgical changes within the right breast. No new mammographic evidence for malignancy. Normal interval follow-up is recommended in 12 months.    B/L mammogram done on 9/18/24 showed unremarkable bilateral mammogram. Postsurgical changes related to \"lumpectomy\" and therapy. Normal interval follow-up is recommended in 12 months.    On October 10, 2024, she presented to me for follow-up.  I have been following her for stage IA right

## 2024-10-16 ENCOUNTER — CLINICAL DOCUMENTATION (OUTPATIENT)
Dept: ONCOLOGY | Age: 74
End: 2024-10-16

## 2024-10-16 NOTE — PROGRESS NOTES
Called patient advising them of their Dexa Bone Density, patient is scheduled for 11/06/24 at [facility] facility @ 11:35 AM arrival for 11:50 AM test, no multi  vitamin or calcium 24  hrs prior to test, patient states understanding

## 2024-11-06 ENCOUNTER — HOSPITAL ENCOUNTER (OUTPATIENT)
Dept: MAMMOGRAPHY | Age: 74
Discharge: HOME | End: 2024-11-06
Attending: INTERNAL MEDICINE
Payer: MEDICARE

## 2024-11-06 DIAGNOSIS — C49.9 PRIMARY MYXOFIBROSARCOMA (HCC): ICD-10-CM

## 2024-11-06 DIAGNOSIS — Z17.0 CARCINOMA OF AREOLA OF RIGHT BREAST IN FEMALE, ESTROGEN RECEPTOR POSITIVE (HCC): ICD-10-CM

## 2024-11-06 DIAGNOSIS — C50.011 CARCINOMA OF AREOLA OF RIGHT BREAST IN FEMALE, ESTROGEN RECEPTOR POSITIVE (HCC): ICD-10-CM

## 2024-11-06 DIAGNOSIS — Z79.811 LONG TERM (CURRENT) USE OF AROMATASE INHIBITORS: ICD-10-CM

## 2024-11-06 PROCEDURE — 77080 DXA BONE DENSITY AXIAL: CPT

## 2024-11-08 ENCOUNTER — OFFICE VISIT (OUTPATIENT)
Age: 74
End: 2024-11-08

## 2024-11-08 VITALS
RESPIRATION RATE: 16 BRPM | BODY MASS INDEX: 34.95 KG/M2 | TEMPERATURE: 97.9 F | DIASTOLIC BLOOD PRESSURE: 80 MMHG | SYSTOLIC BLOOD PRESSURE: 130 MMHG | WEIGHT: 210 LBS | OXYGEN SATURATION: 96 % | HEART RATE: 64 BPM

## 2024-11-08 DIAGNOSIS — D86.9 SARCOIDOSIS: ICD-10-CM

## 2024-11-08 DIAGNOSIS — E78.2 MIXED HYPERLIPIDEMIA: ICD-10-CM

## 2024-11-08 DIAGNOSIS — E11.9 CONTROLLED TYPE 2 DIABETES MELLITUS WITHOUT COMPLICATION, WITHOUT LONG-TERM CURRENT USE OF INSULIN (HCC): Primary | ICD-10-CM

## 2024-11-08 DIAGNOSIS — Z17.0 CARCINOMA OF AREOLA OF RIGHT BREAST IN FEMALE, ESTROGEN RECEPTOR POSITIVE (HCC): ICD-10-CM

## 2024-11-08 DIAGNOSIS — C71.9 OLIGOASTROCYTOMA, WHO GRADE 2 (HCC): ICD-10-CM

## 2024-11-08 DIAGNOSIS — C50.011 CARCINOMA OF AREOLA OF RIGHT BREAST IN FEMALE, ESTROGEN RECEPTOR POSITIVE (HCC): ICD-10-CM

## 2024-11-08 DIAGNOSIS — H90.0 CONDUCTIVE HEARING LOSS, BILATERAL: ICD-10-CM

## 2024-11-08 DIAGNOSIS — J43.8 OTHER EMPHYSEMA (HCC): ICD-10-CM

## 2024-11-08 DIAGNOSIS — C49.9 PRIMARY MYXOFIBROSARCOMA (HCC): ICD-10-CM

## 2024-11-08 RX ORDER — VITAMIN K2 90 MCG
1 CAPSULE ORAL 2 TIMES DAILY
COMMUNITY

## 2024-11-08 NOTE — PROGRESS NOTES
packs/day: 0.8 packs/day for 20.0 years (15.0 ttl pk-yrs)     Types: Cigarettes     Start date: 1983     Quit date: 2003     Years since quittin.2    Smokeless tobacco: Never   Substance Use Topics    Alcohol use: No     Alcohol/week: 0.0 standard drinks of alcohol       LAB REVIEW:  CBC:   Lab Results   Component Value Date/Time    WBC 6.6 2024 10:42 AM    HGB 14.1 2024 10:42 AM    HCT 44.3 2024 10:42 AM     2024 10:42 AM     Lipids:   Lab Results   Component Value Date    HDL 54 2024    LDLDIRECT 108 (H) 10/29/2021    TRIGLYCFAST 185 (H) 2024    CHOLFAST 192 2024     Renal:   Lab Results   Component Value Date/Time    BUN 20 2024 10:42 AM    CREATININE 0.9 2024 10:42 AM     2024 10:42 AM    K 4.3 2024 10:42 AM    ALKPHOS 152 2024 10:42 AM    ALT 21 2024 10:42 AM    AST 15 2024 10:42 AM    GLUCOSE 119 2024 10:42 AM    GLUF 131 2022 11:00 AM     PT/INR:   Lab Results   Component Value Date/Time    INR 1.1 2023 12:40 PM     A1C:   Lab Results   Component Value Date    LABA1C 6.2 2024           Jordan Pickering MD, 2024 , 12:12 PM

## 2024-11-22 ENCOUNTER — CLINICAL DOCUMENTATION (OUTPATIENT)
Dept: INFUSION THERAPY | Age: 74
End: 2024-11-22

## 2024-11-22 NOTE — PROGRESS NOTES
Per Dr. Reynolds this nurse reached out to patient to let her know that her scan showed osteoporosis. Patient informed that Dr. Reynolds would like to start Boniva monthly but she will need a dental clearance first.   Pt stated that she does not have a dentist but she will find one. This nurse told her to call the clinic once she gets dental clearance and Dr. Reynolds would be glad to get medication started. Pt stated her understanding.

## 2024-12-19 ENCOUNTER — TELEPHONE (OUTPATIENT)
Dept: ONCOLOGY | Age: 74
End: 2024-12-19

## 2024-12-19 NOTE — TELEPHONE ENCOUNTER
Patient states she was to call once she got established with a dentist.  She had a cleaning this week with Tashi Aguilar  Phone [phone]  Fax [phone]

## 2025-01-07 RX ORDER — LETROZOLE 2.5 MG/1
2.5 TABLET, FILM COATED ORAL DAILY
Qty: 90 TABLET | Refills: 0 | Status: SHIPPED | OUTPATIENT
Start: 2025-01-07

## 2025-01-07 NOTE — TELEPHONE ENCOUNTER
Patient contacted our office in need of refill for LETROZOLE. Patient has a scheduled appointment on 4/10. Patients preferred pharmacy is [facility]. Pending for patients chart provider ERNST JACOB.

## 2025-01-23 ENCOUNTER — OFFICE VISIT (OUTPATIENT)
Dept: CARDIOLOGY CLINIC | Age: 75
End: 2025-01-23
Payer: MEDICARE

## 2025-01-23 VITALS
HEIGHT: 65 IN | SYSTOLIC BLOOD PRESSURE: 126 MMHG | BODY MASS INDEX: 35.52 KG/M2 | DIASTOLIC BLOOD PRESSURE: 70 MMHG | HEART RATE: 75 BPM | OXYGEN SATURATION: 96 % | WEIGHT: 213.2 LBS

## 2025-01-23 DIAGNOSIS — E78.2 MIXED HYPERLIPIDEMIA: ICD-10-CM

## 2025-01-23 DIAGNOSIS — D86.9 SARCOIDOSIS: Primary | ICD-10-CM

## 2025-01-23 DIAGNOSIS — I87.2 VENOUS INSUFFICIENCY: ICD-10-CM

## 2025-01-23 DIAGNOSIS — R03.0 SITUATIONAL HYPERTENSION: ICD-10-CM

## 2025-01-23 PROCEDURE — 99214 OFFICE O/P EST MOD 30 MIN: CPT | Performed by: NURSE PRACTITIONER

## 2025-01-23 PROCEDURE — 1159F MED LIST DOCD IN RCRD: CPT | Performed by: NURSE PRACTITIONER

## 2025-01-23 PROCEDURE — G8399 PT W/DXA RESULTS DOCUMENT: HCPCS | Performed by: NURSE PRACTITIONER

## 2025-01-23 PROCEDURE — 1090F PRES/ABSN URINE INCON ASSESS: CPT | Performed by: NURSE PRACTITIONER

## 2025-01-23 PROCEDURE — 3017F COLORECTAL CA SCREEN DOC REV: CPT | Performed by: NURSE PRACTITIONER

## 2025-01-23 PROCEDURE — G8417 CALC BMI ABV UP PARAM F/U: HCPCS | Performed by: NURSE PRACTITIONER

## 2025-01-23 PROCEDURE — 1123F ACP DISCUSS/DSCN MKR DOCD: CPT | Performed by: NURSE PRACTITIONER

## 2025-01-23 PROCEDURE — G8427 DOCREV CUR MEDS BY ELIG CLIN: HCPCS | Performed by: NURSE PRACTITIONER

## 2025-01-23 PROCEDURE — 93000 ELECTROCARDIOGRAM COMPLETE: CPT | Performed by: NURSE PRACTITIONER

## 2025-01-23 PROCEDURE — 1036F TOBACCO NON-USER: CPT | Performed by: NURSE PRACTITIONER

## 2025-01-23 ASSESSMENT — ENCOUNTER SYMPTOMS
SHORTNESS OF BREATH: 0
COUGH: 0

## 2025-01-23 NOTE — PROGRESS NOTES
CARDIOLOGY  NOTE    2025    Nereida Roberts (:  1950) is a 74 y.o. female,an established patient with Dr. Jacques, here for evaluation of the following chief complaint(s):  1 Year Follow Up (Pt denies cardiac symptoms/Pt does not drink or smoke) and Shortness of Breath (COPD)        SUBJECTIVE/OBJECTIVE:  History of Present Illness  The patient presents for evaluation of varicose veins, hypertension, and hyperlipidemia.    She reports experiencing cramping sensations in the posterior aspect of her legs, which she attributes to varicose veins. She has been utilizing compression pants as a therapeutic measure but finds them insufficiently tight to provide relief. Additionally, she mentions that the pants tend to roll up, causing discomfort in her abdominal region. She did not get the compression pants from the medical supply.    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 She was seen by Dr. Deirdre Sharp in 2025 and was informed that there is an area of concern, necessitating a follow-up MRI in 2025. They are monitoring for potential recurrence in her legs or lungs.    Her blood pressure readings have been within normal limits, although she occasionally records systolic values around 170. She does not monitor her blood pressure at home. She is not on any antihypertensive medications. She notes that her blood pressure tends to be elevated during visits to [place], which she attributes to anxiety related to her 's condition. Her blood pressure is typically well-controlled during visits to Dr. Pickering's office, with occasional mild elevations that subsequently normalize.    She is currently on cholesterol-lowering medication and is scheduled for routine blood work in 2025.    FAMILY HISTORY  Her older sister had severe varicose veins.    MEDICATIONS  Zetia, Lipitor    Lola has a history of Mixed hyperlipidemia, Sarcoidosis, Depression, Hearing loss,

## 2025-01-23 NOTE — PATIENT INSTRUCTIONS
Thank you for allowing us to care for you today!   We want to ensure we can follow your treatment plan and we strive to give you the best outcomes and experience possible.   If you ever have a life threatening emergency and call 911 - for an ambulance (EMS)  REMEMBER  Our providers can only care for you at:   [facility] or [facility]   Even if you have someone take you or you drive yourself we can only care for you in a [facility] facility. Our providers are not setup at the other healthcare locations!    PLEASE CALL OUR OFFICE DURING NORMAL BUSINESS HOURS  Monday through Friday 8 am to 5 pm  AFTER HOURS the physician on-call cannot help with scheduling, rescheduling, procedure instruction questions or any type of medication need or issue.  [facility] P:[phone] - [facility] P:[phone] - [facility] P:[phone]      If you receive a survey:  We would appreciate you taking the time to share your experience concerning your provider visit in the office.    These surveys are confidential!  We are eager to improve and are counting on you to share your feedback so we can ensure you get the best care possible.

## 2025-02-05 RX ORDER — CALCIUM CITRATE/VITAMIN D3 200MG-6.25
TABLET ORAL
Qty: 50 STRIP | Refills: 5 | Status: SHIPPED | OUTPATIENT
Start: 2025-02-05

## 2025-02-13 RX ORDER — ATORVASTATIN CALCIUM 40 MG/1
40 TABLET, FILM COATED ORAL DAILY
Qty: 90 TABLET | Refills: 1 | Status: SHIPPED | OUTPATIENT
Start: 2025-02-13

## 2025-02-13 NOTE — TELEPHONE ENCOUNTER
Medication:   Requested Prescriptions     Pending Prescriptions Disp Refills    atorvastatin (LIPITOR) 40 MG tablet 90 tablet 1     Sig: Take 1 tablet by mouth daily        Last Filled:  08/08/2024 time for refill /compliant with appts - medication verified    Patient Phone Number: [phone] (home)     Last appt: 11/8/2024   Next appt: 2/24/2025    Last OARRS:        No data to display

## 2025-04-10 ENCOUNTER — OFFICE VISIT (OUTPATIENT)
Dept: ONCOLOGY | Age: 75
End: 2025-04-10
Payer: MEDICARE

## 2025-04-10 ENCOUNTER — HOSPITAL ENCOUNTER (OUTPATIENT)
Dept: INFUSION THERAPY | Age: 75
Discharge: HOME | End: 2025-04-10
Payer: MEDICARE

## 2025-04-10 VITALS
WEIGHT: 208 LBS | SYSTOLIC BLOOD PRESSURE: 138 MMHG | HEIGHT: 64 IN | DIASTOLIC BLOOD PRESSURE: 89 MMHG | TEMPERATURE: 98.7 F | BODY MASS INDEX: 35.51 KG/M2 | RESPIRATION RATE: 18 BRPM | OXYGEN SATURATION: 96 % | HEART RATE: 92 BPM

## 2025-04-10 DIAGNOSIS — C50.011 CARCINOMA OF AREOLA OF RIGHT BREAST IN FEMALE, ESTROGEN RECEPTOR POSITIVE: Primary | ICD-10-CM

## 2025-04-10 DIAGNOSIS — Z17.0 CARCINOMA OF AREOLA OF RIGHT BREAST IN FEMALE, ESTROGEN RECEPTOR POSITIVE: Primary | ICD-10-CM

## 2025-04-10 DIAGNOSIS — C71.9 OLIGOASTROCYTOMA, WHO GRADE 2 (HCC): ICD-10-CM

## 2025-04-10 PROCEDURE — 99212 OFFICE O/P EST SF 10 MIN: CPT

## 2025-04-10 PROCEDURE — 1036F TOBACCO NON-USER: CPT | Performed by: INTERNAL MEDICINE

## 2025-04-10 PROCEDURE — G8417 CALC BMI ABV UP PARAM F/U: HCPCS | Performed by: INTERNAL MEDICINE

## 2025-04-10 PROCEDURE — 1159F MED LIST DOCD IN RCRD: CPT | Performed by: INTERNAL MEDICINE

## 2025-04-10 PROCEDURE — 1090F PRES/ABSN URINE INCON ASSESS: CPT | Performed by: INTERNAL MEDICINE

## 2025-04-10 PROCEDURE — 1123F ACP DISCUSS/DSCN MKR DOCD: CPT | Performed by: INTERNAL MEDICINE

## 2025-04-10 PROCEDURE — 99214 OFFICE O/P EST MOD 30 MIN: CPT | Performed by: INTERNAL MEDICINE

## 2025-04-10 PROCEDURE — G8399 PT W/DXA RESULTS DOCUMENT: HCPCS | Performed by: INTERNAL MEDICINE

## 2025-04-10 PROCEDURE — 1126F AMNT PAIN NOTED NONE PRSNT: CPT | Performed by: INTERNAL MEDICINE

## 2025-04-10 PROCEDURE — G8427 DOCREV CUR MEDS BY ELIG CLIN: HCPCS | Performed by: INTERNAL MEDICINE

## 2025-04-10 RX ORDER — SERTRALINE HYDROCHLORIDE 100 MG/1
TABLET, FILM COATED ORAL
COMMUNITY
Start: 2025-03-20

## 2025-04-10 ASSESSMENT — PATIENT HEALTH QUESTIONNAIRE - PHQ9
1. LITTLE INTEREST OR PLEASURE IN DOING THINGS: NOT AT ALL
2. FEELING DOWN, DEPRESSED OR HOPELESS: NOT AT ALL
SUM OF ALL RESPONSES TO PHQ QUESTIONS 1-9: 0

## 2025-04-10 NOTE — PROGRESS NOTES
MA Rooming Questions  Patient: Nereida Roberts  MRN: [id number]    Date: 4/10/2025        1. Do you have any new issues?   no         2. Do you need any refills on medications?    no    3. Have you had any imaging done since your last visit?   yes - @ [facility]    4. Have you been hospitalized or seen in the emergency room since your last visit here?   no    5. Did the patient have a depression screening completed today? Yes    No data recorded     PHQ-9 Given to (if applicable):               PHQ-9 Score (if applicable):                     [] Positive     []  Negative              Does question #9 need addressed (if applicable)                     [] Yes    []  No               Sylwia Orellana CMA      
record. This time also includes multiple disciplinary evaluation and management of cancer as documented.

## 2025-04-24 ENCOUNTER — OFFICE VISIT (OUTPATIENT)
Age: 75
End: 2025-04-24
Payer: MEDICARE

## 2025-04-24 VITALS
SYSTOLIC BLOOD PRESSURE: 132 MMHG | TEMPERATURE: 98 F | DIASTOLIC BLOOD PRESSURE: 80 MMHG | HEART RATE: 92 BPM | BODY MASS INDEX: 35.86 KG/M2 | WEIGHT: 209 LBS | RESPIRATION RATE: 16 BRPM | OXYGEN SATURATION: 97 %

## 2025-04-24 DIAGNOSIS — E78.2 MIXED HYPERLIPIDEMIA: Primary | ICD-10-CM

## 2025-04-24 DIAGNOSIS — Z17.0 CARCINOMA OF AREOLA OF RIGHT BREAST IN FEMALE, ESTROGEN RECEPTOR POSITIVE (HCC): ICD-10-CM

## 2025-04-24 DIAGNOSIS — F32.89 OTHER DEPRESSION: ICD-10-CM

## 2025-04-24 DIAGNOSIS — J43.8 OTHER EMPHYSEMA (HCC): ICD-10-CM

## 2025-04-24 DIAGNOSIS — C71.9 OLIGOASTROCYTOMA, WHO GRADE 2 (HCC): ICD-10-CM

## 2025-04-24 DIAGNOSIS — C49.9 PRIMARY MYXOFIBROSARCOMA (HCC): ICD-10-CM

## 2025-04-24 DIAGNOSIS — R10.9 FLANK PAIN: ICD-10-CM

## 2025-04-24 DIAGNOSIS — M25.552 PAIN IN LEFT HIP: ICD-10-CM

## 2025-04-24 DIAGNOSIS — E11.9 CONTROLLED TYPE 2 DIABETES MELLITUS WITHOUT COMPLICATION, WITHOUT LONG-TERM CURRENT USE OF INSULIN (HCC): ICD-10-CM

## 2025-04-24 DIAGNOSIS — H90.0 CONDUCTIVE HEARING LOSS, BILATERAL: ICD-10-CM

## 2025-04-24 DIAGNOSIS — C50.011 CARCINOMA OF AREOLA OF RIGHT BREAST IN FEMALE, ESTROGEN RECEPTOR POSITIVE (HCC): ICD-10-CM

## 2025-04-24 PROCEDURE — 3023F SPIROM DOC REV: CPT | Performed by: INTERNAL MEDICINE

## 2025-04-24 PROCEDURE — 99214 OFFICE O/P EST MOD 30 MIN: CPT | Performed by: INTERNAL MEDICINE

## 2025-04-24 PROCEDURE — 1159F MED LIST DOCD IN RCRD: CPT | Performed by: INTERNAL MEDICINE

## 2025-04-24 PROCEDURE — 3051F HG A1C>EQUAL 7.0%<8.0%: CPT | Performed by: INTERNAL MEDICINE

## 2025-04-24 PROCEDURE — 1090F PRES/ABSN URINE INCON ASSESS: CPT | Performed by: INTERNAL MEDICINE

## 2025-04-24 PROCEDURE — G8417 CALC BMI ABV UP PARAM F/U: HCPCS | Performed by: INTERNAL MEDICINE

## 2025-04-24 PROCEDURE — 1123F ACP DISCUSS/DSCN MKR DOCD: CPT | Performed by: INTERNAL MEDICINE

## 2025-04-24 PROCEDURE — G8399 PT W/DXA RESULTS DOCUMENT: HCPCS | Performed by: INTERNAL MEDICINE

## 2025-04-24 PROCEDURE — G8427 DOCREV CUR MEDS BY ELIG CLIN: HCPCS | Performed by: INTERNAL MEDICINE

## 2025-04-24 PROCEDURE — 1036F TOBACCO NON-USER: CPT | Performed by: INTERNAL MEDICINE

## 2025-04-24 RX ORDER — EZETIMIBE 10 MG/1
10 TABLET ORAL DAILY
Qty: 90 TABLET | Refills: 1 | Status: SHIPPED | OUTPATIENT
Start: 2025-04-24

## 2025-04-24 RX ORDER — METFORMIN HYDROCHLORIDE 500 MG/1
500 TABLET, EXTENDED RELEASE ORAL
Qty: 90 TABLET | Refills: 1 | Status: SHIPPED | OUTPATIENT
Start: 2025-04-24 | End: 2025-04-28 | Stop reason: SDUPTHER

## 2025-04-24 NOTE — PROGRESS NOTES
therapy 2015    Brain- [facility]    History of therapeutic radiation     Hyperlipidemia     Kidney stones     Liver dysfunction 06/06/2014    In 7/14 hepatitis ABC negative.  Immune studies negative, iron studies normal.  MRI of liver mild fatty liver. F/u LFTS normal.    Osteopenia 09/26/2014    PONV (postoperative nausea and vomiting)     Sarcoidosis     Skin cancer     nose    SOB (shortness of breath)      Past Surgical History:   Procedure Laterality Date    APPENDECTOMY      BREAST BIOPSY Right 03/05/2020    BREAST LESION BIOPSY EXCISION NEEDLE LOCALIZATION MASS RIGHT performed by Anila Morton MD at [facility] OR    BREAST LUMPECTOMY Right 12/15/2022    RIGHT BREAST LUMPECTOMY PARTIAL MASTECTOMY WITH  WIRE LOCALIZATION, SENTINEL LYMPH NODE BIOPSY performed by Sarah Omer MD at [facility] OR    BREAST SURGERY Right 03/05/2020     right breast mass removal by Dr. Chaparro in [place]    CATARACT REMOVAL Right 02/2022    CATARACT REMOVAL Right 04/2022    CHOLECYSTECTOMY  2006    COLONOSCOPY  2008    f/u 5 years- Dr Valentine    CRANIOTOMY  04/2013    resection of brain tumor Dr. Bolanos    HYSTERECTOMY (CERVIX STATUS UNKNOWN)      IR GUIDED NEPHROSTOMY CATH PLACEMENT RIGHT  08/11/2023    IR NEPHROSTOMY PERCUTANEOUS RIGHT 8/11/2023 [facility] SPECIAL PROCEDURES    LITHOTRIPSY  2005    LITHOTRIPSY Right 09/13/2023    CYSTOSCOPY RIGHT URETERSOCOPY  RETROGRADE PYELOGRAM STONE MANIPULATION HOLIUM LASER LITHOTRIPSY STONE EXTRACTION WITH BASKET STENT PLACEMENT AND REMOVAL OF NEPHROSTOMY TUBE performed by Tae Dean MD at [facility] OR    LITHOTRIPSY  11/15/2023    LITHOTRIPSY Right 11/15/2023    RIGHT CYSTOSCOPY URETEROSCOPY RETROGRADE PYELOGRAM STONE MANIPULATION HOLIUM LASER LITHOTRIPSY  STENT PLACEMENT performed by Derek Haynes MD at [facility] OR    MAMMO IMPLANT DIGITAL DIAG BI  2011    Dr Larios    OVARY REMOVAL      SKIN BIOPSY      TONSILLECTOMY      US BREAST BIOPSY W LOC DEVICE 1ST LESION RIGHT Right

## 2025-04-25 ENCOUNTER — HOSPITAL ENCOUNTER (OUTPATIENT)
Age: 75
Setting detail: SPECIMEN
Discharge: HOME | End: 2025-04-25
Payer: MEDICARE

## 2025-04-25 ENCOUNTER — HOSPITAL ENCOUNTER (OUTPATIENT)
Age: 75
Discharge: HOME | End: 2025-04-25
Payer: MEDICARE

## 2025-04-25 ENCOUNTER — HOSPITAL ENCOUNTER (OUTPATIENT)
Dept: GENERAL RADIOLOGY | Age: 75
Discharge: HOME | End: 2025-04-25
Payer: MEDICARE

## 2025-04-25 DIAGNOSIS — E11.9 CONTROLLED TYPE 2 DIABETES MELLITUS WITHOUT COMPLICATION, WITHOUT LONG-TERM CURRENT USE OF INSULIN (HCC): ICD-10-CM

## 2025-04-25 DIAGNOSIS — E78.2 MIXED HYPERLIPIDEMIA: ICD-10-CM

## 2025-04-25 DIAGNOSIS — M25.552 PAIN IN LEFT HIP: ICD-10-CM

## 2025-04-25 LAB
ALBUMIN SERPL-MCNC: 4.3 G/DL (ref 3.4–5)
ALBUMIN/GLOB SERPL: 1.5 {RATIO}
ALP SERPL-CCNC: 133 U/L (ref 40–129)
ALT SERPL-CCNC: 19 U/L (ref 10–40)
ANION GAP SERPL CALCULATED.3IONS-SCNC: 16 MMOL/L (ref 9–17)
AST SERPL-CCNC: 17 U/L (ref 15–37)
BILIRUB SERPL-MCNC: 0.5 MG/DL (ref 0–1)
BUN SERPL-MCNC: 21 MG/DL (ref 7–20)
CALCIUM SERPL-MCNC: 9.6 MG/DL (ref 8.3–10.6)
CHLORIDE SERPL-SCNC: 100 MMOL/L (ref 99–110)
CHOLEST SERPL-MCNC: 151 MG/DL (ref 125–199)
CO2 SERPL-SCNC: 23 MMOL/L (ref 21–32)
CREAT SERPL-MCNC: 1 MG/DL (ref 0.6–1.2)
CREAT UR-MCNC: 162 MG/DL (ref 28–217)
EST. AVERAGE GLUCOSE BLD GHB EST-MCNC: 176 MG/DL
GFR, ESTIMATED: 60 ML/MIN/1.73M2
GLUCOSE SERPL-MCNC: 130 MG/DL (ref 74–99)
HBA1C MFR BLD: 7.8 % (ref 4.2–6.3)
HDLC SERPL-MCNC: 48 MG/DL
LDLC SERPL CALC-MCNC: 75 MG/DL
MICROALBUMIN UR-MCNC: 2 MG/L
MICROALBUMIN/CREAT UR-RTO: 2 MCG/MG CREAT (ref 0–2)
POTASSIUM SERPL-SCNC: 4.8 MMOL/L (ref 3.5–5.1)
PROT SERPL-MCNC: 7.2 G/DL (ref 6.4–8.2)
SODIUM SERPL-SCNC: 139 MMOL/L (ref 136–145)
TRIGL SERPL-MCNC: 139 MG/DL

## 2025-04-25 PROCEDURE — 36415 COLL VENOUS BLD VENIPUNCTURE: CPT

## 2025-04-25 PROCEDURE — 83036 HEMOGLOBIN GLYCOSYLATED A1C: CPT

## 2025-04-25 PROCEDURE — 73502 X-RAY EXAM HIP UNI 2-3 VIEWS: CPT

## 2025-04-25 PROCEDURE — 82043 UR ALBUMIN QUANTITATIVE: CPT

## 2025-04-25 PROCEDURE — 82570 ASSAY OF URINE CREATININE: CPT

## 2025-04-25 PROCEDURE — 80061 LIPID PANEL: CPT

## 2025-04-25 PROCEDURE — 80053 COMPREHEN METABOLIC PANEL: CPT

## 2025-04-28 ENCOUNTER — RESULTS FOLLOW-UP (OUTPATIENT)
Age: 75
End: 2025-04-28

## 2025-04-28 RX ORDER — METFORMIN HYDROCHLORIDE 500 MG/1
500 TABLET, EXTENDED RELEASE ORAL 2 TIMES DAILY WITH MEALS
Qty: 180 TABLET | Refills: 1 | Status: SHIPPED | OUTPATIENT
Start: 2025-04-28

## 2025-05-06 ENCOUNTER — TELEPHONE (OUTPATIENT)
Age: 75
End: 2025-05-06

## 2025-07-14 RX ORDER — LETROZOLE 2.5 MG/1
2.5 TABLET, FILM COATED ORAL DAILY
Qty: 90 TABLET | Refills: 1 | Status: SHIPPED | OUTPATIENT
Start: 2025-07-14

## 2025-07-14 NOTE — TELEPHONE ENCOUNTER
Patient contacted our office in need of refill for LETROZOLE. Patient has a scheduled appointment on 10/6. Patients preferred pharmacy is [facility]. Pending for patients chart provider ERNST JACOB.

## 2025-08-11 ENCOUNTER — OFFICE VISIT (OUTPATIENT)
Age: 75
End: 2025-08-11
Payer: MEDICARE

## 2025-08-11 VITALS
OXYGEN SATURATION: 96 % | RESPIRATION RATE: 16 BRPM | TEMPERATURE: 98.1 F | HEART RATE: 88 BPM | WEIGHT: 196.4 LBS | SYSTOLIC BLOOD PRESSURE: 122 MMHG | BODY MASS INDEX: 38.56 KG/M2 | DIASTOLIC BLOOD PRESSURE: 72 MMHG | HEIGHT: 60 IN

## 2025-08-11 VITALS
RESPIRATION RATE: 16 BRPM | DIASTOLIC BLOOD PRESSURE: 72 MMHG | OXYGEN SATURATION: 96 % | SYSTOLIC BLOOD PRESSURE: 122 MMHG | TEMPERATURE: 98.1 F | HEART RATE: 88 BPM | BODY MASS INDEX: 38.56 KG/M2 | HEIGHT: 60 IN | WEIGHT: 196.4 LBS

## 2025-08-11 DIAGNOSIS — E78.2 MIXED HYPERLIPIDEMIA: ICD-10-CM

## 2025-08-11 DIAGNOSIS — C50.011 CARCINOMA OF AREOLA OF RIGHT BREAST IN FEMALE, ESTROGEN RECEPTOR POSITIVE (HCC): ICD-10-CM

## 2025-08-11 DIAGNOSIS — E11.9 CONTROLLED TYPE 2 DIABETES MELLITUS WITHOUT COMPLICATION, WITHOUT LONG-TERM CURRENT USE OF INSULIN (HCC): Primary | ICD-10-CM

## 2025-08-11 DIAGNOSIS — F32.89 OTHER DEPRESSION: ICD-10-CM

## 2025-08-11 DIAGNOSIS — Z00.00 MEDICARE ANNUAL WELLNESS VISIT, SUBSEQUENT: Primary | ICD-10-CM

## 2025-08-11 DIAGNOSIS — Z17.0 CARCINOMA OF AREOLA OF RIGHT BREAST IN FEMALE, ESTROGEN RECEPTOR POSITIVE (HCC): ICD-10-CM

## 2025-08-11 DIAGNOSIS — R30.0 DYSURIA: ICD-10-CM

## 2025-08-11 DIAGNOSIS — J43.8 OTHER EMPHYSEMA (HCC): ICD-10-CM

## 2025-08-11 DIAGNOSIS — C49.9 PRIMARY MYXOFIBROSARCOMA (HCC): ICD-10-CM

## 2025-08-11 DIAGNOSIS — H90.3 SENSORINEURAL HEARING LOSS (SNHL) OF BOTH EARS: ICD-10-CM

## 2025-08-11 DIAGNOSIS — C71.9 OLIGOASTROCYTOMA, WHO GRADE 2 (HCC): ICD-10-CM

## 2025-08-11 PROBLEM — R79.89 ABNORMAL LFTS: Status: RESOLVED | Noted: 2023-11-06 | Resolved: 2025-08-11

## 2025-08-11 LAB
BILIRUBIN, POC: ABNORMAL
BLOOD URINE, POC: ABNORMAL
CLARITY, POC: ABNORMAL
COLOR, POC: YELLOW
GLUCOSE URINE, POC: ABNORMAL MG/DL
KETONES, POC: ABNORMAL MG/DL
LEUKOCYTE EST, POC: ABNORMAL
NITRITE, POC: ABNORMAL
PH, POC: 5.5
PROTEIN, POC: ABNORMAL MG/DL
SPECIFIC GRAVITY, POC: >1.03
UROBILINOGEN, POC: 0.2 MG/DL

## 2025-08-11 PROCEDURE — 3051F HG A1C>EQUAL 7.0%<8.0%: CPT | Performed by: INTERNAL MEDICINE

## 2025-08-11 PROCEDURE — 3023F SPIROM DOC REV: CPT | Performed by: INTERNAL MEDICINE

## 2025-08-11 PROCEDURE — 99214 OFFICE O/P EST MOD 30 MIN: CPT | Performed by: INTERNAL MEDICINE

## 2025-08-11 PROCEDURE — 1036F TOBACCO NON-USER: CPT | Performed by: INTERNAL MEDICINE

## 2025-08-11 PROCEDURE — 1159F MED LIST DOCD IN RCRD: CPT | Performed by: INTERNAL MEDICINE

## 2025-08-11 PROCEDURE — 83036 HEMOGLOBIN GLYCOSYLATED A1C: CPT | Performed by: INTERNAL MEDICINE

## 2025-08-11 PROCEDURE — 1123F ACP DISCUSS/DSCN MKR DOCD: CPT | Performed by: INTERNAL MEDICINE

## 2025-08-11 PROCEDURE — 1090F PRES/ABSN URINE INCON ASSESS: CPT | Performed by: INTERNAL MEDICINE

## 2025-08-11 PROCEDURE — G8427 DOCREV CUR MEDS BY ELIG CLIN: HCPCS | Performed by: INTERNAL MEDICINE

## 2025-08-11 PROCEDURE — G8399 PT W/DXA RESULTS DOCUMENT: HCPCS | Performed by: INTERNAL MEDICINE

## 2025-08-11 PROCEDURE — 81002 URINALYSIS NONAUTO W/O SCOPE: CPT | Performed by: INTERNAL MEDICINE

## 2025-08-11 PROCEDURE — G8417 CALC BMI ABV UP PARAM F/U: HCPCS | Performed by: INTERNAL MEDICINE

## 2025-08-11 RX ORDER — IBUPROFEN 600 MG/1
600 TABLET, FILM COATED ORAL EVERY 8 HOURS PRN
COMMUNITY
Start: 2025-05-19

## 2025-08-11 RX ORDER — CIPROFLOXACIN 250 MG/1
250 TABLET, FILM COATED ORAL 2 TIMES DAILY
Qty: 14 TABLET | Refills: 0 | Status: SHIPPED | OUTPATIENT
Start: 2025-08-11 | End: 2025-08-18

## 2025-08-11 SDOH — ECONOMIC STABILITY: FOOD INSECURITY: WITHIN THE PAST 12 MONTHS, YOU WORRIED THAT YOUR FOOD WOULD RUN OUT BEFORE YOU GOT MONEY TO BUY MORE.: NEVER TRUE

## 2025-08-11 SDOH — ECONOMIC STABILITY: FOOD INSECURITY: WITHIN THE PAST 12 MONTHS, THE FOOD YOU BOUGHT JUST DIDN'T LAST AND YOU DIDN'T HAVE MONEY TO GET MORE.: NEVER TRUE

## 2025-08-11 ASSESSMENT — PATIENT HEALTH QUESTIONNAIRE - PHQ9
SUM OF ALL RESPONSES TO PHQ QUESTIONS 1-9: 2
2. FEELING DOWN, DEPRESSED OR HOPELESS: SEVERAL DAYS
1. LITTLE INTEREST OR PLEASURE IN DOING THINGS: SEVERAL DAYS
SUM OF ALL RESPONSES TO PHQ QUESTIONS 1-9: 2

## 2025-08-11 ASSESSMENT — LIFESTYLE VARIABLES
HOW MANY STANDARD DRINKS CONTAINING ALCOHOL DO YOU HAVE ON A TYPICAL DAY: PATIENT DOES NOT DRINK
HOW OFTEN DO YOU HAVE A DRINK CONTAINING ALCOHOL: NEVER

## 2025-08-13 ENCOUNTER — RESULTS FOLLOW-UP (OUTPATIENT)
Age: 75
End: 2025-08-13

## 2025-08-13 LAB
BACTERIA UR CULT: ABNORMAL
ORGANISM: ABNORMAL

## 2025-08-19 RX ORDER — ATORVASTATIN CALCIUM 40 MG/1
40 TABLET, FILM COATED ORAL DAILY
Qty: 90 TABLET | Refills: 1 | Status: SHIPPED | OUTPATIENT
Start: 2025-08-19

## (undated) DEVICE — GLOVE SURG SZ 65 L12IN FNGR THK79MIL GRN LTX FREE

## (undated) DEVICE — DBD-PACK,CYSTOSCOPY,PK VI,AURORA: Brand: MEDLINE

## (undated) DEVICE — STERILE LATEX POWDER-FREE SURGICAL GLOVESWITH NITRILE COATING: Brand: PROTEXIS

## (undated) DEVICE — MAGNETIC INSTR DRAPE 20X16: Brand: MEDLINE INDUSTRIES, INC.

## (undated) DEVICE — GLOVE SURG SZ 7 L12IN FNGR THK79MIL GRN LTX FREE

## (undated) DEVICE — COVER PROBE SOFT FLEX W/STERILE GEL 6X96IN 15X244 CM

## (undated) DEVICE — MARKER (SEE ITEM COMMENTS) SURG MARGIN STD 6 CLR INK ASST CORR CLP

## (undated) DEVICE — TOWEL,OR,DSP,ST,BLUE,STD,6/PK,12PK/CS: Brand: MEDLINE

## (undated) DEVICE — TUBING, SUCTION, 9/32" X 20', STRAIGHT: Brand: MEDLINE INDUSTRIES, INC.

## (undated) DEVICE — GLOVE ORANGE PI 7   MSG9070

## (undated) DEVICE — URETERAL ACCESS SHEATH SET: Brand: NAVIGATOR HD

## (undated) DEVICE — MARKER RAD MARGINMRK

## (undated) DEVICE — SPONGE LAP W18XL18IN WHT COT 4 PLY FLD STRUNG RADPQ DISP ST

## (undated) DEVICE — OPEN-END FLEXI-TIP URETERAL CATHETER: Brand: FLEXI-TIP

## (undated) DEVICE — MARKER SURG SKIN UTIL REGULAR/FINE 2 TIP W/ RUL AND 9 LBL

## (undated) DEVICE — Device

## (undated) DEVICE — CONTAINER,SPECIMEN,OR STERILE,4OZ: Brand: MEDLINE

## (undated) DEVICE — SUTURE COAT VCRL SZ 4-0 L18IN ABSRB UD L19MM PS-2 1/2 CIR J496G

## (undated) DEVICE — GOWN,SIRUS,POLYRNF,BRTHSLV,XLN/XL,20/CS: Brand: MEDLINE

## (undated) DEVICE — NEEDLE HYPO 23GA L1.5IN TURQ POLYPR HUB S STL THN WALL IM

## (undated) DEVICE — RADIOGRAPHY DEVICE SPECIMEN TRANSPEC

## (undated) DEVICE — GLOVE ORANGE PI 7 1/2   MSG9075

## (undated) DEVICE — GOWN,ECLIPSE,POLYRNF,BRTHSLV,XL,30/CS: Brand: MEDLINE

## (undated) DEVICE — NITINOL STONE RETRIEVAL BASKET: Brand: ZERO TIP

## (undated) DEVICE — SYRINGE MED 10ML LUERLOCK TIP W/O SFTY DISP

## (undated) DEVICE — ATTACHMENT SMK EVAC FOR ES PNCL ACCUVAC

## (undated) DEVICE — ELECTRODE ES L2.75IN S STL INSUL BLDE W/ SL EDGE

## (undated) DEVICE — COUNTER NDL 30 COUNT FOAM STRP SGL MAG

## (undated) DEVICE — SHEET, T, LAPAROTOMY, STERILE: Brand: MEDLINE

## (undated) DEVICE — DRAINBAG,ANTI-REFLUX TOWER,L/F,2000ML,LL: Brand: MEDLINE

## (undated) DEVICE — BLADE SURG NO15 C STL SHRP PREM

## (undated) DEVICE — PENCIL ES CRD L10FT HND SWCHING ROCK SWCH W/ EDGE COAT BLDE

## (undated) DEVICE — TUBING, SUCTION, 1/4" X 10', STRAIGHT: Brand: MEDLINE

## (undated) DEVICE — SUTURE VCRL SZ 3-0 L27IN ABSRB UD L26MM SH 1/2 CIR J416H

## (undated) DEVICE — PACK,BASIC,2 GOWNS,NO DRAPES: Brand: MEDLINE

## (undated) DEVICE — YANKAUER,FLEXIBLE HANDLE,REGLR CAPACITY: Brand: MEDLINE INDUSTRIES, INC.

## (undated) DEVICE — MAT ABSRB W28XL48IN C6L FLR ULT W POLY BK

## (undated) DEVICE — SYRINGE IRRIG 60ML SFT PLIABLE BLB EZ TO GRP 1 HND USE W/

## (undated) DEVICE — CONTAINER,SPECIMEN,3OZ,OR STRL: Brand: MEDLINE

## (undated) DEVICE — GLOVE ORANGE PI 8   MSG9080

## (undated) DEVICE — COVER PROBE 96X5IN LOCK TIP FOLD DRAPES

## (undated) DEVICE — GLOVE SURG SZ 65 THK91MIL LTX FREE SYN POLYISOPRENE

## (undated) DEVICE — CATHETER URET 5FR L70CM OPN END SGL LUMN INJ HUB FLEXIMA

## (undated) DEVICE — SHEET,DRAPE,53X77,STERILE: Brand: MEDLINE

## (undated) DEVICE — GOWN,SIRUS,FABRNF,RAGLAN,L,ST,30/CS: Brand: MEDLINE

## (undated) DEVICE — SOLUTION IV IRRIG WATER 1000ML POUR BRL 2F7114

## (undated) DEVICE — GLOVE SURG SZ 65 CRM LTX FREE POLYISOPRENE POLYMER BEAD ANTI

## (undated) DEVICE — GLOVE SURG SZ 8 CRM LTX FREE POLYISOPRENE POLYMER BEAD ANTI

## (undated) DEVICE — SET IRRIG L94IN ID0.281IN W/ 4.5IN DST FLX CONN 2 LD ON OFF

## (undated) DEVICE — PACK,BASIC,IX: Brand: MEDLINE

## (undated) DEVICE — DRAPE,T,LAPARO,TRANS,STERILE: Brand: MEDLINE

## (undated) DEVICE — JELLY,LUBE,STERILE,FLIP TOP,TUBE,2-OZ: Brand: MEDLINE

## (undated) DEVICE — SYRINGE 20ML LL S/C 50

## (undated) DEVICE — BLADE EDGE INSULATED W/SLEEVE 2.75

## (undated) DEVICE — TRAY PREP DRY W/ PREM GLV 2 APPL 6 SPNG 2 UNDPD 1 OVERWRAP

## (undated) DEVICE — COVER US PRB W12XL244CM SURGICAL INTRAOPERATIVE PLAS TAPR L

## (undated) DEVICE — UROLOGIC DRAIN BAG: Brand: UNBRANDED

## (undated) DEVICE — SINGLE-USE DIGITAL FLEXIBLE URETEROSCOPE: Brand: LITHOVUE

## (undated) DEVICE — GAUZE,SPONGE,4"X4",16PLY,XRAY,STRL,LF: Brand: MEDLINE

## (undated) DEVICE — GUIDEWIRE ENDOSCP L150CM DIA0.035IN TIP 3CM PTFE NIT

## (undated) DEVICE — GOWN,ECLIPSE,POLYRNF,BRTHSLV,L,30/CS: Brand: MEDLINE

## (undated) DEVICE — PENCIL,CAUTERY,ROCKER,PTFE,15'CORD: Brand: MEDLINE INDUSTRIES, INC.

## (undated) DEVICE — ANESTHESIA CIRCUIT ADULT-LF: Brand: MEDLINE INDUSTRIES, INC.

## (undated) DEVICE — LINER SUCT CANSTR 1500CC SEMI RIG W/ POR HYDROPHOBIC SHUT

## (undated) DEVICE — PACK,CYSTOSCOPY,PK I,AURORA: Brand: MEDLINE

## (undated) DEVICE — SUTURE PERMA-HAND BLACK  2/0 18IN  FS 685H

## (undated) DEVICE — PRESSURE TUBING: Brand: TRUWAVE

## (undated) DEVICE — ADHESIVE SKIN CLSR 0.7ML TOP DERMBND ADV

## (undated) DEVICE — SUTURE PERMAHAND SZ 2-0 L17X18IN NONABSORBABLE BLK SILK SA65H

## (undated) DEVICE — MAT FLR ABSORBENT SM 40X28 IN 4.6 LT PNK LIQUI-LOC LF DISP

## (undated) DEVICE — CHLORAPREP 26ML ORANGE

## (undated) DEVICE — ELECTRODE LOOP 24FR R ANG MPLR CUT

## (undated) DEVICE — DILATOR/SHEATH SET: Brand: 8/10 DILATOR/SHEATH SET

## (undated) DEVICE — Z INACTIVE USE 2635503 SOLUTION IRRIG 3000ML ST H2O USP UROMATIC PLAS CONT

## (undated) DEVICE — SUTURE VCRL SZ 3-0 L27IN ABSRB UD L17MM RB-1 1/2 CIR J215H

## (undated) DEVICE — ELECTRODE ES AD CRDLSS PT RET REM POLYHESIVE

## (undated) DEVICE — TUBING, SUCTION, 9/32" X 10', STRAIGHT: Brand: MEDLINE

## (undated) DEVICE — SINGLE PORT MANIFOLD: Brand: NEPTUNE 2

## (undated) DEVICE — SYRINGE MED 20ML STD CLR PLAS LUERLOCK TIP N CTRL DISP